# Patient Record
Sex: FEMALE | Race: BLACK OR AFRICAN AMERICAN | Employment: OTHER | URBAN - METROPOLITAN AREA
[De-identification: names, ages, dates, MRNs, and addresses within clinical notes are randomized per-mention and may not be internally consistent; named-entity substitution may affect disease eponyms.]

---

## 2018-03-13 ENCOUNTER — OFFICE VISIT (OUTPATIENT)
Dept: FAMILY MEDICINE CLINIC | Age: 63
End: 2018-03-13
Payer: MEDICAID

## 2018-03-13 ENCOUNTER — HOSPITAL ENCOUNTER (OUTPATIENT)
Age: 63
Discharge: HOME | End: 2018-03-15
Payer: MEDICAID

## 2018-03-13 VITALS
WEIGHT: 193.18 LBS | DIASTOLIC BLOOD PRESSURE: 82 MMHG | HEIGHT: 67 IN | BODY MASS INDEX: 30.32 KG/M2 | HEART RATE: 64 BPM | OXYGEN SATURATION: 97 % | TEMPERATURE: 97.7 F | SYSTOLIC BLOOD PRESSURE: 128 MMHG

## 2018-03-13 DIAGNOSIS — R79.89 HIGH SERUM PARATHYROID HORMONE (PTH): ICD-10-CM

## 2018-03-13 DIAGNOSIS — E11.9 CONTROLLED TYPE 2 DIABETES MELLITUS WITHOUT COMPLICATION, WITHOUT LONG-TERM CURRENT USE OF INSULIN (HCC): Chronic | ICD-10-CM

## 2018-03-13 DIAGNOSIS — M70.62 GREATER TROCHANTERIC BURSITIS OF LEFT HIP: Primary | ICD-10-CM

## 2018-03-13 DIAGNOSIS — R05.3 CHRONIC COUGH: ICD-10-CM

## 2018-03-13 PROCEDURE — 3017F COLORECTAL CA SCREEN DOC REV: CPT | Performed by: FAMILY MEDICINE

## 2018-03-13 PROCEDURE — 82570 ASSAY OF URINE CREATININE: CPT

## 2018-03-13 PROCEDURE — G8427 DOCREV CUR MEDS BY ELIG CLIN: HCPCS | Performed by: FAMILY MEDICINE

## 2018-03-13 PROCEDURE — G8484 FLU IMMUNIZE NO ADMIN: HCPCS | Performed by: FAMILY MEDICINE

## 2018-03-13 PROCEDURE — 36415 COLL VENOUS BLD VENIPUNCTURE: CPT | Performed by: FAMILY MEDICINE

## 2018-03-13 PROCEDURE — 99214 OFFICE O/P EST MOD 30 MIN: CPT | Performed by: FAMILY MEDICINE

## 2018-03-13 PROCEDURE — 82044 UR ALBUMIN SEMIQUANTITATIVE: CPT

## 2018-03-13 PROCEDURE — 83970 ASSAY OF PARATHORMONE: CPT

## 2018-03-13 PROCEDURE — 80048 BASIC METABOLIC PNL TOTAL CA: CPT

## 2018-03-13 PROCEDURE — 1036F TOBACCO NON-USER: CPT | Performed by: FAMILY MEDICINE

## 2018-03-13 PROCEDURE — 3044F HG A1C LEVEL LT 7.0%: CPT | Performed by: FAMILY MEDICINE

## 2018-03-13 PROCEDURE — 3014F SCREEN MAMMO DOC REV: CPT | Performed by: FAMILY MEDICINE

## 2018-03-13 PROCEDURE — G8417 CALC BMI ABV UP PARAM F/U: HCPCS | Performed by: FAMILY MEDICINE

## 2018-03-13 PROCEDURE — 20610 DRAIN/INJ JOINT/BURSA W/O US: CPT | Performed by: FAMILY MEDICINE

## 2018-03-13 PROCEDURE — 84100 ASSAY OF PHOSPHORUS: CPT

## 2018-03-13 RX ORDER — AMMONIUM LACTATE 12 G/100G
LOTION TOPICAL
Qty: 225 G | Refills: 5 | Status: SHIPPED | OUTPATIENT
Start: 2018-03-13 | End: 2018-09-14 | Stop reason: SDUPTHER

## 2018-03-13 RX ORDER — LIDOCAINE HYDROCHLORIDE 20 MG/ML
2.5 INJECTION, SOLUTION INFILTRATION; PERINEURAL ONCE
Status: COMPLETED | OUTPATIENT
Start: 2018-03-13 | End: 2018-03-13

## 2018-03-13 RX ORDER — LANCETS 30 GAUGE
1 EACH MISCELLANEOUS DAILY
Qty: 100 EACH | Refills: 11 | Status: SHIPPED | OUTPATIENT
Start: 2018-03-13 | End: 2019-07-01

## 2018-03-13 RX ORDER — CYCLOBENZAPRINE HCL 5 MG
5 TABLET ORAL NIGHTLY PRN
Qty: 30 TABLET | Refills: 1 | Status: SHIPPED | OUTPATIENT
Start: 2018-03-13 | End: 2018-03-23

## 2018-03-13 RX ORDER — TRIAMCINOLONE ACETONIDE 40 MG/ML
40 INJECTION, SUSPENSION INTRA-ARTICULAR; INTRAMUSCULAR ONCE
Status: COMPLETED | OUTPATIENT
Start: 2018-03-13 | End: 2018-03-13

## 2018-03-13 RX ADMIN — TRIAMCINOLONE ACETONIDE 40 MG: 40 INJECTION, SUSPENSION INTRA-ARTICULAR; INTRAMUSCULAR at 12:38

## 2018-03-13 RX ADMIN — LIDOCAINE HYDROCHLORIDE 2.5 ML: 20 INJECTION, SOLUTION INFILTRATION; PERINEURAL at 12:37

## 2018-03-13 NOTE — PATIENT INSTRUCTIONS
your top knee, but keep your feet together. Do not let your hips roll back. Your legs should open up like a clamshell. 3. Hold for 6 seconds. 4. Slowly lower your knee back down. Rest for 10 seconds. 5. Repeat 8 to 12 times. Follow-up care is a key part of your treatment and safety. Be sure to make and go to all appointments, and call your doctor if you are having problems. It's also a good idea to know your test results and keep a list of the medicines you take. Where can you learn more? Go to [email or web address]://Kanvas [email or web address] org and sign in to your [employer] account. Enter P643 in the [employer] box to learn more about \"Hip Bursitis: Exercises. \"     If you do not have an account, please click on the \"Sign Up Now\" link. Current as of: March 21, 2017  Content Version: 11.5  © 7255-2205 [email or web address]. Care instructions adapted under license by [facility] [place] ([facility]). If you have questions about a medical condition or this instruction, always ask your healthcare professional. [place] [address] any warranty or liability for your use of this information. Patient Education        Learning About a Hip Bursa Injection  What is a hip bursa injection? A bursa is a small sac of fluid that cushions an area between tendons and bones. The bursa on the outer side of the hip bone is called the trochanteric (say \"XTUA-[id number]-NSMF-[id number]\") bursa. Sometimes it can become swollen and painful. This condition is called bursitis. An injection into the bursa is a shot of medicine to reduce pain and swelling. The medicines may include pain relievers and steroid medicines. A steroid shot can sometimes help with short-term pain relief when other treatments haven't worked. How is a hip bursa injection done? First the area over the bursa will be cleaned. Your doctor may use a tiny needle to numb the skin over the area where you will get the injection.   If a tiny needle is used to numb the area, your doctor will use another needle to inject the medicine. Your doctor may use a pain reliever, a steroid, or both. You may feel some pressure or discomfort. The procedure takes 10 to 30 minutes. But the shot itself usually takes only a few minutes. Your doctor may put ice on the area before you go home. You will probably go home shortly after your shot. What can you expect after the injection? You may have numbness over your hip for a few hours. If your shot included both a pain reliever and a steroid, the pain will probably go away right away. But it might come back after a few hours. This might happen if the pain reliever wears off and the steroid has not started to work yet. The steroid medicine generally takes a few days to work. If the pain comes back, you can put ice or a cold pack on your hip for 10 to 20 minutes at a time. Put a thin cloth between the ice and your skin. Follow your doctor's instructions carefully. Avoid strenuous activities on the day you get the shot, especially those that put stress on your hip. Steroids don't always work. But when they do, the pain relief can last for several days to a few months or longer. Follow-up care is a key part of your treatment and safety. Be sure to make and go to all appointments, and call your doctor if you are having problems. It's also a good idea to know your test results and keep a list of the medicines you take. Where can you learn more? Go to https://SmartyPants VitaminsromeSwing by [email or web address] org and sign in to your [employer] account. Enter Q608 in the [employer] box to learn more about \"Learning About a Hip Bursa Injection. \"     If you do not have an account, please click on the \"Sign Up Now\" link. Current as of: March 21, 2017  Content Version: 11.5  © 3801-7739 Healthwise, Incorporated. Care instructions adapted under license by [facility] ([facility]).  If you have questions about a medical condition or this instruction, always ask your healthcare

## 2018-03-14 LAB
ANION GAP SERPL CALCULATED.3IONS-SCNC: 15 MMOL/L (ref 7–16)
BUN BLDV-MCNC: 18 MG/DL (ref 8–23)
CALCIUM SERPL-MCNC: 10.3 MG/DL (ref 8.6–10.2)
CHLORIDE BLD-SCNC: 101 MMOL/L (ref 98–107)
CO2: 28 MMOL/L (ref 22–29)
CREAT SERPL-MCNC: 0.6 MG/DL (ref 0.5–1)
CREATININE URINE: 25 MG/DL (ref 29–226)
GFR AFRICAN AMERICAN: >60
GFR NON-AFRICAN AMERICAN: >60 ML/MIN/1.73
GLUCOSE BLD-MCNC: 108 MG/DL (ref 74–109)
MICROALBUMIN UR-MCNC: <12 MG/L
MICROALBUMIN/CREAT UR-RTO: ABNORMAL (ref 0–30)
PARATHYROID HORMONE INTACT: 38 PG/ML (ref 15–65)
PHOSPHORUS: 3.2 MG/DL (ref 2.5–4.5)
POTASSIUM SERPL-SCNC: 4.5 MMOL/L (ref 3.5–5)
SODIUM BLD-SCNC: 144 MMOL/L (ref 132–146)

## 2018-03-14 ASSESSMENT — ENCOUNTER SYMPTOMS
COUGH: 0
WHEEZING: 0

## 2018-03-14 NOTE — PROGRESS NOTES
3/14/2018    Samantha Guzman is a 57 y.o. female here for   Chief Complaint   Patient presents with    Check-Up     L leg (hip through toe) continues to be bothersome 8/10 on pain scale    Results     lab work through Dr. Tramaine Young (located in [address] [place] [address])     Here for f/u and to review labs. Also c/o persistent hip pain. She has been doing home exercise as instructed. She has had a little improvement. Now sometimes feels that pain is going into left buttocks as well. Moderate in severity. Aggravated by pressure. Partially relieved by exercises. Labs reviewd - last aic 6.0 in January. Vitamin d 24. Fasting glucose 115. She requests refill of flexeril which she has had in the past. Having some mild ear pain. Respiratory symptoms have improved. spiriva was helpful. It seemed to improve her cough. She has h/o bronchitis, she has no formal diagnosis of copd.  with dementia causing her some stress. No depression. She has lost some weight. She attributes this to stress. Appetite is okay. No change in bowel habits. Mammogram and colon cancer screening are up to date. Allergies   Allergen Reactions    Penicillins        Medications reviewed and updated       Past Medical/Surgical Hx;  Reviewed with patient      Diagnosis Date    Bronchitis     Hyperlipidemia     Hypertension     Thyroid disease      Past Surgical History:   Procedure Laterality Date    COLONOSCOPY  1/21/2015    UPPER GASTROINTESTINAL ENDOSCOPY  1/21/2015       Past Family Hx:  Reviewed with patient  No family history on file. Social Hx:  Reviewed with patient  Social History   Substance Use Topics    Smoking status: Never Smoker    Smokeless tobacco: Never Used    Alcohol use No       Immunization History   Administered Date(s) Administered    Pneumococcal Polysaccharide ([id number]) 10/29/2015    Tdap (Boostrix, Adacel) 08/15/2017       Review of Systems  Review of Systems   Constitutional: Positive for weight loss. was prepped with iodine swabs. It was then wiped with an alcohol swab. An injection of 40 mg of Kenalog and 2.5 cc of 2% lidocaine without epinephrine was injected into the left trochanteric bursa after first aspirating to assure no blood return. The injection site was then covered with a band aid. The procedure was tolerated well. Routine wound instructions given verbally to the patient. Advised to notify if bleeding, excessive bruising, or swelling develops. Assessment/Plan:  Richard Jaime was seen today for check-up and results. Diagnoses and all orders for this visit:    Greater trochanteric bursitis of left hip  Joint injection today with improvement in symptoms. See procedure note. Add prn flexeril - use sparingly  -     cyclobenzaprine (FLEXERIL) 5 MG tablet; Take 1 tablet by mouth nightly as needed for Muscle spasms  -     triamcinolone acetonide (KENALOG-40) injection 40 mg; Inject 1 mL into the articular space once  -     lidocaine 2 % injection 2.5 mL; 2.5 mLs by Other route once    Controlled type 2 diabetes mellitus without complication, without long-term current use of insulin ([facility])  Well controlled  -     Lancets MISC; 1 each by Does not apply route daily Please give patient lancets that are covered under her insurance and patients preferance. -     Basic Metabolic Panel; Future    High serum parathyroid hormone (PTH)  Probable secondary to low vitamin d   Repeat today  -     Microalbumin / Creatinine Urine Ratio; Future  -     PTH, Intact; Future  -     Basic Metabolic Panel; Future  -     Phosphorus; Future    Chronic cough  Improved with spiriva  Suspect chronic bronchitis/ copd  Consider pfts  Clinically stable with daily spiriva  -     tiotropium (SPIRIVA RESPIMAT) 1.25 MCG/ACT AERS inhaler;  Inhale 2 puffs into the lungs daily    Other orders  -     ammonium lactate (AMLACTIN) 12 % lotion; APPLY ONCE DAILY        f/u 3 months    Counseled regarding above diagnosis, including possible

## 2018-05-04 ENCOUNTER — OFFICE VISIT (OUTPATIENT)
Dept: FAMILY MEDICINE CLINIC | Age: 63
End: 2018-05-04
Payer: MEDICAID

## 2018-05-04 VITALS
BODY MASS INDEX: 30.61 KG/M2 | DIASTOLIC BLOOD PRESSURE: 80 MMHG | SYSTOLIC BLOOD PRESSURE: 118 MMHG | TEMPERATURE: 98.2 F | HEART RATE: 64 BPM | HEIGHT: 67 IN | OXYGEN SATURATION: 97 % | WEIGHT: 195 LBS | RESPIRATION RATE: 16 BRPM

## 2018-05-04 DIAGNOSIS — H65.01 RIGHT ACUTE SEROUS OTITIS MEDIA, RECURRENCE NOT SPECIFIED: Primary | ICD-10-CM

## 2018-05-04 PROCEDURE — 99213 OFFICE O/P EST LOW 20 MIN: CPT | Performed by: PHYSICIAN ASSISTANT

## 2018-05-04 RX ORDER — BENZONATATE 200 MG/1
200 CAPSULE ORAL 3 TIMES DAILY PRN
Qty: 15 CAPSULE | Refills: 0 | Status: SHIPPED | OUTPATIENT
Start: 2018-05-04 | End: 2018-09-15

## 2018-05-04 RX ORDER — AZITHROMYCIN 250 MG/1
250 TABLET, FILM COATED ORAL DAILY
Qty: 6 TABLET | Refills: 0 | Status: SHIPPED | OUTPATIENT
Start: 2018-05-04 | End: 2018-09-14

## 2018-09-14 ENCOUNTER — OFFICE VISIT (OUTPATIENT)
Dept: FAMILY MEDICINE CLINIC | Age: 63
End: 2018-09-14
Payer: MEDICAID

## 2018-09-14 VITALS
HEART RATE: 92 BPM | SYSTOLIC BLOOD PRESSURE: 122 MMHG | OXYGEN SATURATION: 99 % | BODY MASS INDEX: 31.59 KG/M2 | DIASTOLIC BLOOD PRESSURE: 64 MMHG | TEMPERATURE: 98.6 F | WEIGHT: 201.25 LBS | HEIGHT: 67 IN

## 2018-09-14 DIAGNOSIS — Z12.39 SCREENING FOR BREAST CANCER: ICD-10-CM

## 2018-09-14 DIAGNOSIS — E78.2 MIXED HYPERLIPIDEMIA: Primary | Chronic | ICD-10-CM

## 2018-09-14 DIAGNOSIS — I10 ESSENTIAL HYPERTENSION: Chronic | ICD-10-CM

## 2018-09-14 DIAGNOSIS — Z23 NEED FOR SHINGLES VACCINE: ICD-10-CM

## 2018-09-14 DIAGNOSIS — E03.9 HYPOTHYROIDISM, UNSPECIFIED TYPE: Chronic | ICD-10-CM

## 2018-09-14 DIAGNOSIS — Z87.09 HISTORY OF BRONCHITIS: ICD-10-CM

## 2018-09-14 PROCEDURE — G8427 DOCREV CUR MEDS BY ELIG CLIN: HCPCS | Performed by: FAMILY MEDICINE

## 2018-09-14 PROCEDURE — G8417 CALC BMI ABV UP PARAM F/U: HCPCS | Performed by: FAMILY MEDICINE

## 2018-09-14 PROCEDURE — 3017F COLORECTAL CA SCREEN DOC REV: CPT | Performed by: FAMILY MEDICINE

## 2018-09-14 PROCEDURE — 1036F TOBACCO NON-USER: CPT | Performed by: FAMILY MEDICINE

## 2018-09-14 PROCEDURE — 99213 OFFICE O/P EST LOW 20 MIN: CPT | Performed by: FAMILY MEDICINE

## 2018-09-14 RX ORDER — AMMONIUM LACTATE 12 G/100G
LOTION TOPICAL
Qty: 225 G | Refills: 5 | Status: SHIPPED | OUTPATIENT
Start: 2018-09-14 | End: 2018-12-21 | Stop reason: SDUPTHER

## 2018-09-14 RX ORDER — LEVOTHYROXINE SODIUM 0.07 MG/1
TABLET ORAL
Qty: 30 TABLET | Refills: 2 | Status: SHIPPED | OUTPATIENT
Start: 2018-09-14 | End: 2018-12-02 | Stop reason: SDUPTHER

## 2018-09-14 RX ORDER — RANITIDINE 150 MG/1
150 TABLET ORAL NIGHTLY PRN
Qty: 30 TABLET | Refills: 5 | Status: SHIPPED | OUTPATIENT
Start: 2018-09-14 | End: 2019-07-24 | Stop reason: SDUPTHER

## 2018-09-14 RX ORDER — EZETIMIBE 10 MG/1
10 TABLET ORAL DAILY
Qty: 30 TABLET | Refills: 5 | Status: SHIPPED | OUTPATIENT
Start: 2018-09-14 | End: 2018-12-21 | Stop reason: SDUPTHER

## 2018-09-14 RX ORDER — IBUPROFEN 800 MG/1
800 TABLET ORAL EVERY 8 HOURS PRN
Qty: 30 TABLET | Refills: 5 | Status: SHIPPED | OUTPATIENT
Start: 2018-09-14 | End: 2019-07-24 | Stop reason: SDUPTHER

## 2018-09-14 ASSESSMENT — PATIENT HEALTH QUESTIONNAIRE - PHQ9
SUM OF ALL RESPONSES TO PHQ QUESTIONS 1-9: 0
SUM OF ALL RESPONSES TO PHQ9 QUESTIONS 1 & 2: 0
2. FEELING DOWN, DEPRESSED OR HOPELESS: 0
SUM OF ALL RESPONSES TO PHQ QUESTIONS 1-9: 0
1. LITTLE INTEREST OR PLEASURE IN DOING THINGS: 0

## 2018-09-15 ASSESSMENT — ENCOUNTER SYMPTOMS
WHEEZING: 0
COUGH: 0

## 2018-09-15 NOTE — PROGRESS NOTES
DRINK EVERY  g 11    Lancets MISC 1 each by Does not apply route daily Please give patient lancets that are covered under her insurance and patients preferance. 100 each 11    Alcohol Swabs (B-D SINGLE USE SWABS REGULAR) PADS USE AS DIRECTED 100 each 11    ONE TOUCH ULTRA TEST strip USE TO CHECK GLUCOSE ONCE DAILY 25 strip 47    Glucose Blood (BLOOD GLUCOSE TEST STRIPS) STRP Please send ultra one blood glucose strips, test blood sugar daily 100 strip 11    Cholecalciferol (VITAMIN D3) 2000 units CAPS TK 1 C PO QD  5    chlorthalidone (HYGROTON) 25 MG tablet Take 1 tablet by mouth daily 30 tablet 5    azelastine (ASTELIN) 0.1 % nasal spray USE 1 TO 2 SPRAYS IEN BID  1    fluticasone (FLONASE) 50 MCG/ACT nasal spray USE 1 SPRAY IEN BID  1    levocetirizine (XYZAL) 5 MG tablet TK 1 T PO QHS  0    ketotifen (ZADITOR) 0.025 % ophthalmic solution INT 1 GTT IN OU BID PRF ALLERGIES  5    albuterol sulfate HFA (PROVENTIL HFA) 108 (90 Base) MCG/ACT inhaler Inhale 2 puffs into the lungs every 6 hours as needed for Wheezing 1 Inhaler 1    Spacer/Aero-Holding Chambers (E-Z SPACER) ASHLEE As directed 1 Device 0    aliskiren (TEKTURNA) 300 MG tablet Take 300 mg by mouth daily.  benzonatate (TESSALON) 200 MG capsule Take 1 capsule by mouth 3 times daily as needed for Cough 15 capsule 0    tiotropium (SPIRIVA RESPIMAT) 1.25 MCG/ACT AERS inhaler Inhale 2 puffs into the lungs daily 2 Inhaler 0    mupirocin (BACTROBAN) 2 % ointment Apply 3 times daily. 1 Tube 0     No current facility-administered medications for this visit. Patient's past medical, surgical, social and/or family history reviewed, updated in chart, and are non-contributory (unless otherwise stated). Review of Systems  Review of Systems   Constitutional: Negative for chills and fever. Respiratory: Negative for cough and wheezing. Cardiovascular: Negative for chest pain and leg swelling. Musculoskeletal: Negative for falls. Neurological: Negative for dizziness and headaches. PE:  VS:  /64   Pulse 92   Temp 98.6 °F (37 °C) (Oral)   Ht 5' 7\" (1.702 m)   Wt 201 lb 4 oz (91.3 kg)   SpO2 99%   Breastfeeding? No   BMI 31.52 kg/m²   Physical Exam   Constitutional: She is oriented to person, place, and time. She appears well-developed and well-nourished. HENT:   Head: Normocephalic and atraumatic.   cobblestoning   Cardiovascular: Normal rate and regular rhythm. Exam reveals no gallop and no friction rub. No murmur heard. Pulmonary/Chest: Effort normal and breath sounds normal. She has no wheezes. She has no rales. Musculoskeletal: She exhibits no edema. Neurological: She is alert and oriented to person, place, and time. Skin: Skin is warm and dry. Assessment/Plan:  Jordan Song was seen today for medication refill. Diagnoses and all orders for this visit:    Need for shingles vaccine  -     zoster recombinant adjuvanted vaccine (SHINGRIX) 50 MCG SUSR injection; Inject 0.5 mLs into the muscle once for 1 dose    Hypothyroidism, unspecified type  [facility] higher doses of medication . Repeat annually   -     levothyroxine (SYNTHROID) 75 MCG tablet; TAKE 1 TABLET BY MOUTH EVERY DAY    Essential hypertension  At goal  Appreciate nephrology management    Mixed hyperlipidemia  Cont zetia  -     ezetimibe (ZETIA) 10 MG tablet; Take 1 tablet by mouth daily    History of bronchitis, chronic cough, has done well with spiriva in the past, no indication for daily use but at first sign of bronchitis use daily  Samples of spiriva   -     tiotropium (SPIRIVA RESPIMAT) 1.25 MCG/ACT AERS inhaler; Inhale 2 puffs into the lungs daily    Screening for breast cancer  -     BECCA DIGITAL SCREEN W CAD BILATERAL; Future    Other orders  -     ammonium lactate (AMLACTIN) 12 % lotion; APPLY ONCE DAILY  -     ibuprofen (ADVIL;MOTRIN) 800 MG tablet;  Take 1 tablet by mouth every 8 hours as needed for

## 2018-09-20 ENCOUNTER — TELEPHONE (OUTPATIENT)
Dept: FAMILY MEDICINE CLINIC | Age: 63
End: 2018-09-20

## 2018-10-09 ENCOUNTER — OFFICE VISIT (OUTPATIENT)
Dept: FAMILY MEDICINE CLINIC | Age: 63
End: 2018-10-09
Payer: MEDICAID

## 2018-10-09 VITALS
OXYGEN SATURATION: 97 % | WEIGHT: 204 LBS | SYSTOLIC BLOOD PRESSURE: 110 MMHG | TEMPERATURE: 98.6 F | DIASTOLIC BLOOD PRESSURE: 84 MMHG | RESPIRATION RATE: 16 BRPM | HEIGHT: 67 IN | HEART RATE: 83 BPM | BODY MASS INDEX: 32.02 KG/M2

## 2018-10-09 DIAGNOSIS — J40 BRONCHITIS: Primary | ICD-10-CM

## 2018-10-09 DIAGNOSIS — R05.8 PRODUCTIVE COUGH: ICD-10-CM

## 2018-10-09 PROCEDURE — G8484 FLU IMMUNIZE NO ADMIN: HCPCS | Performed by: NURSE PRACTITIONER

## 2018-10-09 PROCEDURE — G8427 DOCREV CUR MEDS BY ELIG CLIN: HCPCS | Performed by: NURSE PRACTITIONER

## 2018-10-09 PROCEDURE — 99213 OFFICE O/P EST LOW 20 MIN: CPT | Performed by: NURSE PRACTITIONER

## 2018-10-09 PROCEDURE — 3017F COLORECTAL CA SCREEN DOC REV: CPT | Performed by: NURSE PRACTITIONER

## 2018-10-09 PROCEDURE — G8417 CALC BMI ABV UP PARAM F/U: HCPCS | Performed by: NURSE PRACTITIONER

## 2018-10-09 PROCEDURE — 1036F TOBACCO NON-USER: CPT | Performed by: NURSE PRACTITIONER

## 2018-10-09 RX ORDER — GUAIFENESIN AND CODEINE PHOSPHATE 100; 10 MG/5ML; MG/5ML
10 SOLUTION ORAL 4 TIMES DAILY PRN
Qty: 150 ML | Refills: 0 | Status: SHIPPED | OUTPATIENT
Start: 2018-10-09 | End: 2018-10-16

## 2018-10-09 RX ORDER — DOXYCYCLINE HYCLATE 100 MG
100 TABLET ORAL 2 TIMES DAILY
Qty: 20 TABLET | Refills: 0 | Status: SHIPPED | OUTPATIENT
Start: 2018-10-09 | End: 2018-10-19

## 2018-10-09 RX ORDER — METHYLPREDNISOLONE 4 MG/1
TABLET ORAL
Qty: 1 KIT | Refills: 0 | Status: SHIPPED | OUTPATIENT
Start: 2018-10-09 | End: 2018-10-15

## 2018-10-09 ASSESSMENT — ENCOUNTER SYMPTOMS
EYE DISCHARGE: 0
SHORTNESS OF BREATH: 0
VOMITING: 0
SINUS PRESSURE: 0
COLOR CHANGE: 0
COUGH: 1
STRIDOR: 0
ABDOMINAL PAIN: 0
SINUS PAIN: 0
DIARRHEA: 0
RHINORRHEA: 1
SORE THROAT: 0
WHEEZING: 1
EYE PAIN: 0
NAUSEA: 0
APNEA: 0
EYE ITCHING: 0
VOICE CHANGE: 0
CHOKING: 0
CHEST TIGHTNESS: 0
PHOTOPHOBIA: 0
EYE REDNESS: 0

## 2018-11-13 ENCOUNTER — HOSPITAL ENCOUNTER (OUTPATIENT)
Dept: GENERAL RADIOLOGY | Age: 63
Discharge: HOME | End: 2018-11-15
Payer: MEDICAID

## 2018-11-13 DIAGNOSIS — Z12.39 SCREENING FOR BREAST CANCER: ICD-10-CM

## 2018-11-13 PROCEDURE — 77067 SCR MAMMO BI INCL CAD: CPT

## 2018-11-30 ENCOUNTER — OFFICE VISIT (OUTPATIENT)
Dept: FAMILY MEDICINE CLINIC | Age: 63
End: 2018-11-30
Payer: MEDICAID

## 2018-11-30 ENCOUNTER — HOSPITAL ENCOUNTER (OUTPATIENT)
Age: 63
Discharge: HOME | End: 2018-12-02
Payer: MEDICAID

## 2018-11-30 ENCOUNTER — HOSPITAL ENCOUNTER (OUTPATIENT)
Dept: GENERAL RADIOLOGY | Age: 63
Discharge: HOME | End: 2018-12-02
Payer: MEDICAID

## 2018-11-30 VITALS
TEMPERATURE: 97.8 F | RESPIRATION RATE: 16 BRPM | WEIGHT: 201 LBS | SYSTOLIC BLOOD PRESSURE: 108 MMHG | HEART RATE: 69 BPM | DIASTOLIC BLOOD PRESSURE: 68 MMHG | BODY MASS INDEX: 37.95 KG/M2 | OXYGEN SATURATION: 99 % | HEIGHT: 61 IN

## 2018-11-30 DIAGNOSIS — M25.551 BILATERAL HIP PAIN: ICD-10-CM

## 2018-11-30 DIAGNOSIS — M25.552 BILATERAL HIP PAIN: Primary | ICD-10-CM

## 2018-11-30 DIAGNOSIS — M25.551 BILATERAL HIP PAIN: Primary | ICD-10-CM

## 2018-11-30 DIAGNOSIS — M25.552 BILATERAL HIP PAIN: ICD-10-CM

## 2018-11-30 PROCEDURE — 99213 OFFICE O/P EST LOW 20 MIN: CPT | Performed by: PHYSICIAN ASSISTANT

## 2018-11-30 PROCEDURE — G8427 DOCREV CUR MEDS BY ELIG CLIN: HCPCS | Performed by: PHYSICIAN ASSISTANT

## 2018-11-30 PROCEDURE — G8484 FLU IMMUNIZE NO ADMIN: HCPCS | Performed by: PHYSICIAN ASSISTANT

## 2018-11-30 PROCEDURE — 73521 X-RAY EXAM HIPS BI 2 VIEWS: CPT

## 2018-11-30 PROCEDURE — 1036F TOBACCO NON-USER: CPT | Performed by: PHYSICIAN ASSISTANT

## 2018-11-30 PROCEDURE — G8417 CALC BMI ABV UP PARAM F/U: HCPCS | Performed by: PHYSICIAN ASSISTANT

## 2018-11-30 PROCEDURE — 3017F COLORECTAL CA SCREEN DOC REV: CPT | Performed by: PHYSICIAN ASSISTANT

## 2018-11-30 RX ORDER — CYCLOBENZAPRINE HCL 5 MG
5 TABLET ORAL EVERY 8 HOURS PRN
Qty: 15 TABLET | Refills: 0 | Status: SHIPPED | OUTPATIENT
Start: 2018-11-30 | End: 2018-12-10

## 2018-11-30 RX ORDER — TRIAMCINOLONE ACETONIDE 40 MG/ML
40 INJECTION, SUSPENSION INTRA-ARTICULAR; INTRAMUSCULAR ONCE
Status: COMPLETED | OUTPATIENT
Start: 2018-11-30 | End: 2018-11-30

## 2018-11-30 RX ADMIN — TRIAMCINOLONE ACETONIDE 40 MG: 40 INJECTION, SUSPENSION INTRA-ARTICULAR; INTRAMUSCULAR at 10:32

## 2018-11-30 NOTE — PROGRESS NOTES
Chief Complaint:   Hip Pain (bilateral, was told related to bursitis in the past, pain is worse with ambulation)      History of Present Illness   Source of history provided by: patient. Eliecer Altman is a 57 y.o. old female who has a past medical history of:   Past Medical History:   Diagnosis Date    Bronchitis     Hyperlipidemia     Hypertension     Thyroid disease    Presents to the [facility] care for evaluation of pain in the bilateral hips radiating into the bilateral groin folds for the past few weeks, worsened over the past few days. Denies any known injury to the site. Denies any recent falls. Pt states she has a history of bursitis and these symptoms feel the same. States the pain is exacerbated by ambulation. Pt denies any bowel/bladder incontinence, weakness, paresthesias, abdominal pain, hematuria, N/V/D, fever, chills, HA, neck pain, recent illness, dysuria, or lethargy. Pt has been taking Advil at home with minimal relief. ROS    Unless otherwise stated in this report or unable to obtain because of the patient's clinical or mental status as evidenced by the medical record, this patients's positive and negative responses for Review of Systems, constitutional, psych, eyes, ENT, cardiovascular, respiratory, gastrointestinal, neurological, genitourinary, musculoskeletal, integument systems and systems related to the presenting problem are either stated in the preceding or were not pertinent or were negative for the symptoms and/or complaints related to the medical problem. Past Medical History:   Past Surgical History:   Procedure Laterality Date    COLONOSCOPY  1/21/2015    UPPER GASTROINTESTINAL ENDOSCOPY  1/21/2015     Social History:  reports that she has never smoked. She has never used smokeless tobacco. She reports that she does not drink alcohol or use drugs. Family History: family history is not on file.   Allergies: Penicillins    Physical Exam         VS:  /68   Pulse 69 Temp 97.8 °F (36.6 °C) (Oral)   Resp 16   Ht 5' 1\" (1.549 m)   Wt 201 lb (91.2 kg)   SpO2 99%   BMI 37.98 kg/m²    Oxygen Saturation Interpretation: Normal.    Constitutional:  Alert, development consistent with age. Lungs:  Clear to auscultation and breath sounds equal.  Heart:  Regular rate and rhythm, normal heart sounds, without pathological murmurs, ectopy, gallops, or rubs. Back: Tenderness: Mild TTP over the left SI joint and bilateral groin folds with no appreciable midline tenderness. No groin swelling or masses noted. No evidence of hernias on exam.  Femoral pulses 2+ and bounding bilaterally. Swelling: No edema. Range of Motion: Increased pain with internal and external rotation of the bilateral hips, worse on the left. Straight leg raising: Negative straight leg raise. Skin:  No bruising, redness, abrasions, or rashes. Distal Function:              Motor deficit: Strength 5/5 in LE's bilaterally. Sensory deficit: Distal sensation intact. Pulse deficit: Distal pulses 2+ and bounding. Calf Tenderness:  No bilateral calf tenderness. Negative Kaitlin's sign bilaterally               Reflexes: Intact at knee and achilles bilaterally. Gait:  No antalgia noted. Skin:  Normal turgor. Warm, dry, without visible rash. Neurological:  Alert and oriented. Motor functions intact. Lab / Imaging Results   (All laboratory and radiology results have been personally reviewed by myself)  Labs:      Imaging: All Radiology results interpreted by Radiologist unless otherwise noted. Assessment / Plan     Impression(s):  1. Bilateral hip pain        Disposition:  Disposition: Discharge to home. Order given for x-rays of the bilateral hips, will call with results once available. Symptoms are most likely being caused by underlying OA. Kenalog injection administered, potential reactions discussed.  Script written for

## 2018-12-02 DIAGNOSIS — E03.9 HYPOTHYROIDISM, UNSPECIFIED TYPE: Chronic | ICD-10-CM

## 2018-12-04 RX ORDER — LEVOTHYROXINE SODIUM 0.07 MG/1
TABLET ORAL
Qty: 30 TABLET | Refills: 0 | Status: SHIPPED | OUTPATIENT
Start: 2018-12-04 | End: 2018-12-21 | Stop reason: SDUPTHER

## 2018-12-15 DIAGNOSIS — E03.9 HYPOTHYROIDISM, UNSPECIFIED TYPE: Chronic | ICD-10-CM

## 2018-12-18 RX ORDER — LEVOTHYROXINE SODIUM 0.07 MG/1
TABLET ORAL
Qty: 30 TABLET | Refills: 0 | OUTPATIENT
Start: 2018-12-18

## 2018-12-21 ENCOUNTER — HOSPITAL ENCOUNTER (OUTPATIENT)
Age: 63
Discharge: HOME | End: 2018-12-23
Payer: MEDICARE

## 2018-12-21 ENCOUNTER — OFFICE VISIT (OUTPATIENT)
Dept: FAMILY MEDICINE CLINIC | Age: 63
End: 2018-12-21
Payer: MEDICARE

## 2018-12-21 VITALS
RESPIRATION RATE: 16 BRPM | HEART RATE: 72 BPM | WEIGHT: 205.8 LBS | TEMPERATURE: 98 F | BODY MASS INDEX: 31.19 KG/M2 | SYSTOLIC BLOOD PRESSURE: 112 MMHG | HEIGHT: 68 IN | OXYGEN SATURATION: 97 % | DIASTOLIC BLOOD PRESSURE: 62 MMHG

## 2018-12-21 DIAGNOSIS — Z00.00 INITIAL MEDICARE ANNUAL WELLNESS VISIT: Primary | ICD-10-CM

## 2018-12-21 DIAGNOSIS — L85.3 XEROSIS OF SKIN: ICD-10-CM

## 2018-12-21 DIAGNOSIS — Z13.820 SCREENING FOR OSTEOPOROSIS: ICD-10-CM

## 2018-12-21 DIAGNOSIS — E03.9 HYPOTHYROIDISM, UNSPECIFIED TYPE: Chronic | ICD-10-CM

## 2018-12-21 DIAGNOSIS — I10 ESSENTIAL HYPERTENSION: Chronic | ICD-10-CM

## 2018-12-21 DIAGNOSIS — Z87.09 HISTORY OF BRONCHITIS: ICD-10-CM

## 2018-12-21 DIAGNOSIS — E11.9 CONTROLLED TYPE 2 DIABETES MELLITUS WITHOUT COMPLICATION, WITHOUT LONG-TERM CURRENT USE OF INSULIN (HCC): Chronic | ICD-10-CM

## 2018-12-21 DIAGNOSIS — R10.31 RIGHT LOWER QUADRANT ABDOMINAL PAIN: ICD-10-CM

## 2018-12-21 DIAGNOSIS — E78.2 MIXED HYPERLIPIDEMIA: Chronic | ICD-10-CM

## 2018-12-21 DIAGNOSIS — Z78.0 ASYMPTOMATIC MENOPAUSAL STATE: ICD-10-CM

## 2018-12-21 LAB
ALBUMIN SERPL-MCNC: 4.5 G/DL (ref 3.5–5.2)
ALP BLD-CCNC: 115 U/L (ref 35–104)
ALT SERPL-CCNC: 20 U/L (ref 0–32)
ANION GAP SERPL CALCULATED.3IONS-SCNC: 16 MMOL/L (ref 7–16)
AST SERPL-CCNC: 25 U/L (ref 0–31)
BASOPHILS ABSOLUTE: 0.02 E9/L (ref 0–0.2)
BASOPHILS RELATIVE PERCENT: 0.4 % (ref 0–2)
BILIRUB SERPL-MCNC: 0.4 MG/DL (ref 0–1.2)
BUN BLDV-MCNC: 24 MG/DL (ref 8–23)
CALCIUM SERPL-MCNC: 10.5 MG/DL (ref 8.6–10.2)
CHLORIDE BLD-SCNC: 100 MMOL/L (ref 98–107)
CHOLESTEROL, TOTAL: 304 MG/DL (ref 0–199)
CO2: 28 MMOL/L (ref 22–29)
CREAT SERPL-MCNC: 0.7 MG/DL (ref 0.5–1)
EOSINOPHILS ABSOLUTE: 0.17 E9/L (ref 0.05–0.5)
EOSINOPHILS RELATIVE PERCENT: 3.1 % (ref 0–6)
GFR AFRICAN AMERICAN: >60
GFR NON-AFRICAN AMERICAN: >60 ML/MIN/1.73
GLUCOSE BLD-MCNC: 129 MG/DL (ref 74–99)
HBA1C MFR BLD: 6.1 % (ref 4–5.6)
HCT VFR BLD CALC: 36.1 % (ref 34–48)
HDLC SERPL-MCNC: 92 MG/DL
HEMOGLOBIN: 11.6 G/DL (ref 11.5–15.5)
IMMATURE GRANULOCYTES #: 0.04 E9/L
IMMATURE GRANULOCYTES %: 0.7 % (ref 0–5)
LDL CHOLESTEROL CALCULATED: 198 MG/DL (ref 0–99)
LYMPHOCYTES ABSOLUTE: 1.72 E9/L (ref 1.5–4)
LYMPHOCYTES RELATIVE PERCENT: 31.6 % (ref 20–42)
MCH RBC QN AUTO: 32.8 PG (ref 26–35)
MCHC RBC AUTO-ENTMCNC: 32.1 % (ref 32–34.5)
MCV RBC AUTO: 102 FL (ref 80–99.9)
MONOCYTES ABSOLUTE: 0.41 E9/L (ref 0.1–0.95)
MONOCYTES RELATIVE PERCENT: 7.5 % (ref 2–12)
NEUTROPHILS ABSOLUTE: 3.08 E9/L (ref 1.8–7.3)
NEUTROPHILS RELATIVE PERCENT: 56.7 % (ref 43–80)
PDW BLD-RTO: 13.8 FL (ref 11.5–15)
PLATELET # BLD: 365 E9/L (ref 130–450)
PMV BLD AUTO: 10.2 FL (ref 7–12)
POTASSIUM SERPL-SCNC: 4.4 MMOL/L (ref 3.5–5)
RBC # BLD: 3.54 E12/L (ref 3.5–5.5)
SODIUM BLD-SCNC: 144 MMOL/L (ref 132–146)
TOTAL PROTEIN: 7.2 G/DL (ref 6.4–8.3)
TRIGL SERPL-MCNC: 72 MG/DL (ref 0–149)
TSH SERPL DL<=0.05 MIU/L-ACNC: 4.84 UIU/ML (ref 0.27–4.2)
VLDLC SERPL CALC-MCNC: 14 MG/DL
WBC # BLD: 5.4 E9/L (ref 4.5–11.5)

## 2018-12-21 PROCEDURE — 85025 COMPLETE CBC W/AUTO DIFF WBC: CPT

## 2018-12-21 PROCEDURE — G8427 DOCREV CUR MEDS BY ELIG CLIN: HCPCS | Performed by: FAMILY MEDICINE

## 2018-12-21 PROCEDURE — 3044F HG A1C LEVEL LT 7.0%: CPT | Performed by: FAMILY MEDICINE

## 2018-12-21 PROCEDURE — 1101F PT FALLS ASSESS-DOCD LE1/YR: CPT | Performed by: FAMILY MEDICINE

## 2018-12-21 PROCEDURE — 3017F COLORECTAL CA SCREEN DOC REV: CPT | Performed by: FAMILY MEDICINE

## 2018-12-21 PROCEDURE — 2022F DILAT RTA XM EVC RTNOPTHY: CPT | Performed by: FAMILY MEDICINE

## 2018-12-21 PROCEDURE — 1090F PRES/ABSN URINE INCON ASSESS: CPT | Performed by: FAMILY MEDICINE

## 2018-12-21 PROCEDURE — G0402 INITIAL PREVENTIVE EXAM: HCPCS | Performed by: FAMILY MEDICINE

## 2018-12-21 PROCEDURE — G8417 CALC BMI ABV UP PARAM F/U: HCPCS | Performed by: FAMILY MEDICINE

## 2018-12-21 PROCEDURE — 4040F PNEUMOC VAC/ADMIN/RCVD: CPT | Performed by: FAMILY MEDICINE

## 2018-12-21 PROCEDURE — G8400 PT W/DXA NO RESULTS DOC: HCPCS | Performed by: FAMILY MEDICINE

## 2018-12-21 PROCEDURE — 99212 OFFICE O/P EST SF 10 MIN: CPT | Performed by: FAMILY MEDICINE

## 2018-12-21 PROCEDURE — 83036 HEMOGLOBIN GLYCOSYLATED A1C: CPT

## 2018-12-21 PROCEDURE — 84443 ASSAY THYROID STIM HORMONE: CPT

## 2018-12-21 PROCEDURE — 80061 LIPID PANEL: CPT

## 2018-12-21 PROCEDURE — G8484 FLU IMMUNIZE NO ADMIN: HCPCS | Performed by: FAMILY MEDICINE

## 2018-12-21 PROCEDURE — 80053 COMPREHEN METABOLIC PANEL: CPT

## 2018-12-21 PROCEDURE — 1036F TOBACCO NON-USER: CPT | Performed by: FAMILY MEDICINE

## 2018-12-21 PROCEDURE — 1123F ACP DISCUSS/DSCN MKR DOCD: CPT | Performed by: FAMILY MEDICINE

## 2018-12-21 RX ORDER — LEVOTHYROXINE SODIUM 0.07 MG/1
TABLET ORAL
Qty: 30 TABLET | Refills: 3 | Status: SHIPPED | OUTPATIENT
Start: 2018-12-21 | End: 2019-05-12 | Stop reason: SDUPTHER

## 2018-12-21 RX ORDER — AMMONIUM LACTATE 12 G/100G
LOTION TOPICAL
Qty: 225 G | Refills: 5 | Status: SHIPPED
Start: 2018-12-21 | End: 2022-03-31 | Stop reason: SDUPTHER

## 2018-12-21 RX ORDER — ISOPROPYL ALCOHOL 0.75 G/1
SWAB TOPICAL
Qty: 100 EACH | Refills: 11 | Status: SHIPPED | OUTPATIENT
Start: 2018-12-21 | End: 2019-06-19 | Stop reason: ALTCHOICE

## 2018-12-21 RX ORDER — EZETIMIBE 10 MG/1
10 TABLET ORAL DAILY
Qty: 30 TABLET | Refills: 5 | Status: SHIPPED | OUTPATIENT
Start: 2018-12-21 | End: 2019-07-24 | Stop reason: SDUPTHER

## 2018-12-21 RX ORDER — CHLORTHALIDONE 25 MG/1
25 TABLET ORAL DAILY
Qty: 30 TABLET | Refills: 5 | Status: SHIPPED | OUTPATIENT
Start: 2018-12-21 | End: 2019-07-24 | Stop reason: SDUPTHER

## 2018-12-21 RX ORDER — TRIAMCINOLONE ACETONIDE 1 MG/G
CREAM TOPICAL
Qty: 1 TUBE | Refills: 1 | Status: SHIPPED
Start: 2018-12-21 | End: 2020-06-19 | Stop reason: SDUPTHER

## 2018-12-21 ASSESSMENT — LIFESTYLE VARIABLES
HOW OFTEN DURING THE LAST YEAR HAVE YOU HAD A FEELING OF GUILT OR REMORSE AFTER DRINKING: 0
HOW OFTEN DO YOU HAVE SIX OR MORE DRINKS ON ONE OCCASION: 0
HOW OFTEN DURING THE LAST YEAR HAVE YOU NEEDED AN ALCOHOLIC DRINK FIRST THING IN THE MORNING TO GET YOURSELF GOING AFTER A NIGHT OF HEAVY DRINKING: 0
HOW OFTEN DURING THE LAST YEAR HAVE YOU BEEN UNABLE TO REMEMBER WHAT HAPPENED THE NIGHT BEFORE BECAUSE YOU HAD BEEN DRINKING: 0
HAS A RELATIVE, FRIEND, DOCTOR, OR ANOTHER HEALTH PROFESSIONAL EXPRESSED CONCERN ABOUT YOUR DRINKING OR SUGGESTED YOU CUT DOWN: 0
HOW OFTEN DURING THE LAST YEAR HAVE YOU FAILED TO DO WHAT WAS NORMALLY EXPECTED FROM YOU BECAUSE OF DRINKING: 0
HOW OFTEN DURING THE LAST YEAR HAVE YOU FOUND THAT YOU WERE NOT ABLE TO STOP DRINKING ONCE YOU HAD STARTED: 0
HAVE YOU OR SOMEONE ELSE BEEN INJURED AS A RESULT OF YOUR DRINKING: 0
HOW MANY STANDARD DRINKS CONTAINING ALCOHOL DO YOU HAVE ON A TYPICAL DAY: 0
AUDIT TOTAL SCORE: 1
HOW OFTEN DO YOU HAVE A DRINK CONTAINING ALCOHOL: 1
AUDIT-C TOTAL SCORE: 1

## 2018-12-21 ASSESSMENT — PATIENT HEALTH QUESTIONNAIRE - PHQ9
SUM OF ALL RESPONSES TO PHQ QUESTIONS 1-9: 0
SUM OF ALL RESPONSES TO PHQ QUESTIONS 1-9: 0

## 2018-12-21 ASSESSMENT — ANXIETY QUESTIONNAIRES: GAD7 TOTAL SCORE: 0

## 2018-12-24 DIAGNOSIS — E83.52 HYPERCALCEMIA: Primary | ICD-10-CM

## 2018-12-24 DIAGNOSIS — D75.89 MACROCYTOSIS WITHOUT ANEMIA: ICD-10-CM

## 2019-01-02 ENCOUNTER — HOSPITAL ENCOUNTER (OUTPATIENT)
Dept: GENERAL RADIOLOGY | Age: 64
Discharge: HOME | End: 2019-01-04
Payer: MEDICARE

## 2019-01-02 DIAGNOSIS — Z78.0 ASYMPTOMATIC MENOPAUSAL STATE: ICD-10-CM

## 2019-01-02 DIAGNOSIS — Z00.00 INITIAL MEDICARE ANNUAL WELLNESS VISIT: ICD-10-CM

## 2019-01-02 PROCEDURE — 77080 DXA BONE DENSITY AXIAL: CPT

## 2019-01-04 ENCOUNTER — TELEPHONE (OUTPATIENT)
Dept: FAMILY MEDICINE CLINIC | Age: 64
End: 2019-01-04

## 2019-02-01 ENCOUNTER — OFFICE VISIT (OUTPATIENT)
Dept: FAMILY MEDICINE CLINIC | Age: 64
End: 2019-02-01
Payer: MEDICARE

## 2019-02-01 VITALS
RESPIRATION RATE: 14 BRPM | TEMPERATURE: 98.1 F | OXYGEN SATURATION: 97 % | DIASTOLIC BLOOD PRESSURE: 90 MMHG | SYSTOLIC BLOOD PRESSURE: 140 MMHG | HEART RATE: 65 BPM | BODY MASS INDEX: 32.96 KG/M2 | HEIGHT: 67 IN | WEIGHT: 210 LBS

## 2019-02-01 DIAGNOSIS — J06.9 VIRAL URI: Primary | ICD-10-CM

## 2019-02-01 PROCEDURE — 99213 OFFICE O/P EST LOW 20 MIN: CPT | Performed by: PHYSICIAN ASSISTANT

## 2019-02-01 RX ORDER — DEXTROMETHORPHAN HYDROBROMIDE AND PROMETHAZINE HYDROCHLORIDE 15; 6.25 MG/5ML; MG/5ML
5 SYRUP ORAL EVERY 6 HOURS PRN
Qty: 120 ML | Refills: 0 | Status: SHIPPED | OUTPATIENT
Start: 2019-02-01 | End: 2019-07-24

## 2019-03-14 ENCOUNTER — OFFICE VISIT (OUTPATIENT)
Dept: FAMILY MEDICINE CLINIC | Age: 64
End: 2019-03-14
Payer: MEDICARE

## 2019-03-14 VITALS
DIASTOLIC BLOOD PRESSURE: 72 MMHG | WEIGHT: 204 LBS | TEMPERATURE: 98 F | OXYGEN SATURATION: 98 % | BODY MASS INDEX: 32.02 KG/M2 | HEIGHT: 67 IN | SYSTOLIC BLOOD PRESSURE: 112 MMHG | HEART RATE: 71 BPM | RESPIRATION RATE: 16 BRPM

## 2019-03-14 DIAGNOSIS — M79.645 PAIN OF LEFT THUMB: ICD-10-CM

## 2019-03-14 DIAGNOSIS — E11.9 CONTROLLED TYPE 2 DIABETES MELLITUS WITHOUT COMPLICATION, WITHOUT LONG-TERM CURRENT USE OF INSULIN (HCC): ICD-10-CM

## 2019-03-14 DIAGNOSIS — E78.2 MIXED HYPERLIPIDEMIA: ICD-10-CM

## 2019-03-14 DIAGNOSIS — E03.9 HYPOTHYROIDISM, UNSPECIFIED TYPE: Primary | ICD-10-CM

## 2019-03-14 PROCEDURE — 99213 OFFICE O/P EST LOW 20 MIN: CPT | Performed by: FAMILY MEDICINE

## 2019-03-14 ASSESSMENT — ENCOUNTER SYMPTOMS
COUGH: 0
WHEEZING: 0

## 2019-03-14 ASSESSMENT — PATIENT HEALTH QUESTIONNAIRE - PHQ9
2. FEELING DOWN, DEPRESSED OR HOPELESS: 0
SUM OF ALL RESPONSES TO PHQ QUESTIONS 1-9: 0
SUM OF ALL RESPONSES TO PHQ QUESTIONS 1-9: 0
1. LITTLE INTEREST OR PLEASURE IN DOING THINGS: 0
SUM OF ALL RESPONSES TO PHQ9 QUESTIONS 1 & 2: 0

## 2019-05-02 ENCOUNTER — OFFICE VISIT (OUTPATIENT)
Dept: FAMILY MEDICINE CLINIC | Age: 64
End: 2019-05-02
Payer: MEDICARE

## 2019-05-02 VITALS
DIASTOLIC BLOOD PRESSURE: 70 MMHG | SYSTOLIC BLOOD PRESSURE: 124 MMHG | TEMPERATURE: 98.4 F | OXYGEN SATURATION: 97 % | HEIGHT: 68 IN | RESPIRATION RATE: 16 BRPM | WEIGHT: 212 LBS | BODY MASS INDEX: 32.13 KG/M2 | HEART RATE: 77 BPM

## 2019-05-02 DIAGNOSIS — M54.31 RIGHT SIDED SCIATICA: ICD-10-CM

## 2019-05-02 DIAGNOSIS — R10.9 ACUTE RIGHT FLANK PAIN: Primary | ICD-10-CM

## 2019-05-02 LAB
BILIRUBIN, POC: ABNORMAL
BLOOD URINE, POC: ABNORMAL
CLARITY, POC: CLEAR
COLOR, POC: YELLOW
GLUCOSE URINE, POC: ABNORMAL
KETONES, POC: ABNORMAL
LEUKOCYTE EST, POC: ABNORMAL
NITRITE, POC: ABNORMAL
PH, POC: 5.5
PROTEIN, POC: ABNORMAL
SPECIFIC GRAVITY, POC: >=1.03
UROBILINOGEN, POC: 0.2

## 2019-05-02 PROCEDURE — 96372 THER/PROPH/DIAG INJ SC/IM: CPT | Performed by: NURSE PRACTITIONER

## 2019-05-02 PROCEDURE — 81002 URINALYSIS NONAUTO W/O SCOPE: CPT | Performed by: NURSE PRACTITIONER

## 2019-05-02 PROCEDURE — 99213 OFFICE O/P EST LOW 20 MIN: CPT | Performed by: NURSE PRACTITIONER

## 2019-05-02 RX ORDER — KETOROLAC TROMETHAMINE 30 MG/ML
30 INJECTION, SOLUTION INTRAMUSCULAR; INTRAVENOUS ONCE
Status: COMPLETED | OUTPATIENT
Start: 2019-05-02 | End: 2019-05-02

## 2019-05-02 RX ADMIN — KETOROLAC TROMETHAMINE 30 MG: 30 INJECTION, SOLUTION INTRAMUSCULAR; INTRAVENOUS at 11:18

## 2019-05-02 ASSESSMENT — ENCOUNTER SYMPTOMS
EYE PAIN: 0
VOMITING: 0
STRIDOR: 0
ANAL BLEEDING: 0
PHOTOPHOBIA: 0
RECTAL PAIN: 0
ABDOMINAL PAIN: 0
CONSTIPATION: 0
SHORTNESS OF BREATH: 0
EYE REDNESS: 0
EYE DISCHARGE: 0
COUGH: 0
NAUSEA: 0
EYE ITCHING: 0
WHEEZING: 0
COLOR CHANGE: 0
DIARRHEA: 0
ABDOMINAL DISTENTION: 0
BLOOD IN STOOL: 0

## 2019-05-02 NOTE — PROGRESS NOTES
Jillian Oakley is a 70 y.o. female who presents today for   Chief Complaint   Patient presents with    Lower Back Pain     on right side, started 4 days ago. HPI    Pt presents today with c/o right sciatica pain and right flank pain that started 4 days ago. Pt does have a h/o sciatica, denies h/o kidney stones or recurrent UTI/Kidney Infections. Pt denies any recent/past injuries, denies urinary/bowel issues, denies numbness/tingling to BLE, pt is afebrile and tolerating PO well        [address]:  Patient's past medical, surgical, social and/or family history reviewed, updated in chart, and are non-contributory (unless otherwise stated). Medications and allergies also reviewed and updated in chart. Review of Systems  Review of Systems   Constitutional: Negative for appetite change, chills, diaphoresis, fatigue and fever. Eyes: Negative for photophobia, pain, discharge, redness, itching and visual disturbance. Respiratory: Negative for cough, shortness of breath, wheezing and stridor. Cardiovascular: Negative for chest pain, palpitations and leg swelling. Gastrointestinal: Negative for abdominal distention, abdominal pain, anal bleeding, blood in stool, constipation, diarrhea, nausea, rectal pain and vomiting. Genitourinary: Positive for flank pain (right). Negative for decreased urine volume, difficulty urinating, dysuria, enuresis, frequency, hematuria, pelvic pain, urgency, vaginal bleeding, vaginal discharge and vaginal pain. Musculoskeletal:        Right SI pain-h/o sciatica   Skin: Negative for color change, pallor and rash.        Physical Exam:    VS:  /70   Pulse 77   Temp 98.4 °F (36.9 °C) (Oral)   Resp 16   Ht 5' 7.5\" (1.715 m)   Wt 212 lb (96.2 kg)   SpO2 97%   BMI 32.71 kg/m²   LAST WEIGHT:  Wt Readings from Last 3 Encounters:   05/02/19 212 lb (96.2 kg)   03/14/19 204 lb (92.5 kg)   02/01/19 210 lb (95.3 kg)     Physical Exam   Constitutional: She appears well-developed and well-nourished. No distress. Eyes: Pupils are equal, round, and reactive to light. Conjunctivae and EOM are normal. Right eye exhibits no discharge. Left eye exhibits no discharge. Neck: Normal range of motion. Neck supple. Cardiovascular: Normal rate, regular rhythm, normal heart sounds and intact distal pulses. No peripheral edema   Pulmonary/Chest: Effort normal and breath sounds normal. No stridor. No respiratory distress. She has no wheezes. She has no rales. She exhibits no tenderness. Abdominal: Soft. Bowel sounds are normal. She exhibits no distension and no mass. There is no tenderness. There is no guarding. No suprapubic or CVA tenderness   Musculoskeletal:   Moderate pain present with palpation to right SI and right paraspinal muscles/flank region. No erythema/edema or deformities present. Minimal lumbar spine tenderness. Full ROM, slow in changing positions from sitting to standing, normal DTRs and pulses to BLE   Lymphadenopathy:     She has no cervical adenopathy. Skin: Skin is warm and dry. No rash noted. She is not diaphoretic. No erythema. No pallor. Nursing note and vitals reviewed. Assessment / Plan:      Kymberly Islas was seen today for lower back pain. Diagnoses and all orders for this visit:    Acute right flank pain  Discussed trace blood present in urine and possible Kidney Stone, pt did have UA in the past w/trace blood present  Advised to go to ED with any worsening pain or urinary issues as discussed  -     POCT Urinalysis no Micro-trace blood present  -     ketorolac (TORADOL) injection 30 mg    Right sided sciatica  Advised to alternate ice/heat to affected area  Advised on sciatica exercises  -     ketorolac (TORADOL) injection 30 mg         Call or go to ED immediately if symptoms worsen or persist.    Return if symptoms worsen or fail to improve. , or sooner if necessary.       Educational materials and/or home exercises printed for patient's review and were included in patient instructions on his/her After Visit Summary and given to patient at the end of visit. Counseled regarding above diagnosis, including possible risks and complications,  especially if left uncontrolled. Counseled regarding the possible side effects, risks, benefits and alternatives to treatment; patient and/or guardian verbalizes understanding, agrees, feels comfortable with and wishes to proceed with above treatment plan. Advised patient to call with any new medication issues, and read all Rx info from pharmacy to assure aware of all possible risks and side effects of medication before taking. Reviewed age and gender appropriate health screening exams and vaccinations. Advised patient regarding importance of keeping up with recommended health maintenance and to schedule as soon as possible if overdue, as this is important in assessing for undiagnosed pathology, especially cancer, as well as protecting against potentially harmful/life threatening disease. Patient and/or guardian verbalizes understanding and agrees with above counseling, assessment and plan. All questions answered.     Aleksandra Sierra, APRN - CNP

## 2019-05-03 ENCOUNTER — TELEPHONE (OUTPATIENT)
Dept: ORTHOPEDIC SURGERY | Age: 64
End: 2019-05-03

## 2019-05-03 DIAGNOSIS — R52 PAIN: Primary | ICD-10-CM

## 2019-05-07 ENCOUNTER — OFFICE VISIT (OUTPATIENT)
Dept: ORTHOPEDIC SURGERY | Age: 64
End: 2019-05-07
Payer: MEDICARE

## 2019-05-07 VITALS
HEIGHT: 67 IN | WEIGHT: 212 LBS | HEART RATE: 76 BPM | BODY MASS INDEX: 33.27 KG/M2 | RESPIRATION RATE: 22 BRPM | TEMPERATURE: 98.6 F | SYSTOLIC BLOOD PRESSURE: 127 MMHG | DIASTOLIC BLOOD PRESSURE: 73 MMHG

## 2019-05-07 DIAGNOSIS — M18.12 ARTHRITIS OF CARPOMETACARPAL (CMC) JOINT OF LEFT THUMB: Primary | ICD-10-CM

## 2019-05-07 PROCEDURE — L3924 HFO WITHOUT JOINTS PRE OTS: HCPCS | Performed by: ORTHOPAEDIC SURGERY

## 2019-05-07 PROCEDURE — 99203 OFFICE O/P NEW LOW 30 MIN: CPT | Performed by: ORTHOPAEDIC SURGERY

## 2019-05-07 PROCEDURE — 20605 DRAIN/INJ JOINT/BURSA W/O US: CPT | Performed by: ORTHOPAEDIC SURGERY

## 2019-05-07 RX ORDER — DEXAMETHASONE SODIUM PHOSPHATE 4 MG/ML
4 INJECTION, SOLUTION INTRA-ARTICULAR; INTRALESIONAL; INTRAMUSCULAR; INTRAVENOUS; SOFT TISSUE ONCE
Status: COMPLETED | OUTPATIENT
Start: 2019-05-07 | End: 2019-05-07

## 2019-05-07 RX ADMIN — DEXAMETHASONE SODIUM PHOSPHATE 4 MG: 4 INJECTION, SOLUTION INTRA-ARTICULAR; INTRALESIONAL; INTRAMUSCULAR; INTRAVENOUS; SOFT TISSUE at 13:24

## 2019-05-07 NOTE — PROGRESS NOTES
distress, and appears stated age  EYES:  Lids and lashes normal, pupils equal, round and reactive to light, extra ocular muscles intact, sclera clear, conjunctiva normal  ENT:  Normocephalic, without obvious abnormality, atraumatic, sinuses nontender on palpation, external ears without lesions, oral pharynx with moist mucus membranes, tonsils without erythema or exudates, gums normal and good dentition. NECK:  Supple, symmetrical, trachea midline, no adenopathy, thyroid symmetric, not enlarged and no tenderness, skin normal    NEUROLOGIC:  Awake, alert, oriented to name, place and time. Cranial nerves II-XII are grossly intact. Motor is 5 out of 5 bilaterally. Sensory is intact.  gait is normal.  MUSCULOSKELETAL:    Left upper extremity: Nontender about the shoulder and elbow. Negative Tinel's at the cubital and carpal tunnel. Negative Phalen's. Positive CMC grind test. Negative extends. Negative triggering of the thumb in his middle ring and small fingers. Negative atrophy. APB strength 5/5. Negative Wartenberg's cross finger testing. 2+ radial pulse. Radial, median nerves intact to light touch otherwise neurovascular intact. DATA:    CBC:   Lab Results   Component Value Date    WBC 5.4 12/21/2018    RBC 3.54 12/21/2018    HGB 11.6 12/21/2018    HCT 36.1 12/21/2018    .0 12/21/2018    MCH 32.8 12/21/2018    MCHC 32.1 12/21/2018    RDW 13.8 12/21/2018     12/21/2018    MPV 10.2 12/21/2018     PT/INR:  No results found for: PROTIME, INR    Radiology Review:  X-rays of the left hand obtained today in the office AP lateral and obliques demonstrate Doris Osler stage II basal joint arthritis. Impression office x-rays: Left thumb basal joint arthritis    IMPRESSION:  · Left thumb arthritis    PLAN:  Today's findings were explained to patient. Recommended cortisone injection and bracing. This fails discussed briefly CMC arthroplasty. All questions answered.       Procedure Note Wrist Thumb CMC Cortisone Injection    The left wrist thumb CMC joint was identified as the injection site. The risk and benefits of a cortisone injection were explained and the patient consented to the injection. Under sterile conditions, the wrist was injected with a mixture of 1 mL of 1% Lidocaine and 1 mL of 4 mg/mL Dexamethasone without complication.  A sterile bandage was applied

## 2019-05-12 DIAGNOSIS — E03.9 HYPOTHYROIDISM, UNSPECIFIED TYPE: Chronic | ICD-10-CM

## 2019-05-14 RX ORDER — LEVOTHYROXINE SODIUM 0.07 MG/1
TABLET ORAL
Qty: 30 TABLET | Refills: 0 | Status: SHIPPED | OUTPATIENT
Start: 2019-05-14 | End: 2019-07-01 | Stop reason: SDUPTHER

## 2019-06-19 DIAGNOSIS — E11.9 CONTROLLED TYPE 2 DIABETES MELLITUS WITHOUT COMPLICATION, WITHOUT LONG-TERM CURRENT USE OF INSULIN (HCC): Primary | ICD-10-CM

## 2019-06-19 RX ORDER — GLUCOSAMINE HCL/CHONDROITIN SU 500-400 MG
CAPSULE ORAL
Qty: 100 STRIP | Refills: 3 | Status: SHIPPED
Start: 2019-06-19 | End: 2020-03-06 | Stop reason: SDUPTHER

## 2019-07-01 DIAGNOSIS — E03.9 HYPOTHYROIDISM, UNSPECIFIED TYPE: Chronic | ICD-10-CM

## 2019-07-02 RX ORDER — LEVOTHYROXINE SODIUM 0.07 MG/1
75 TABLET ORAL DAILY
Qty: 30 TABLET | Refills: 0 | Status: SHIPPED | OUTPATIENT
Start: 2019-07-02 | End: 2019-07-24 | Stop reason: SDUPTHER

## 2019-07-24 ENCOUNTER — OFFICE VISIT (OUTPATIENT)
Dept: FAMILY MEDICINE CLINIC | Age: 64
End: 2019-07-24
Payer: MEDICARE

## 2019-07-24 VITALS
DIASTOLIC BLOOD PRESSURE: 74 MMHG | BODY MASS INDEX: 32.49 KG/M2 | SYSTOLIC BLOOD PRESSURE: 128 MMHG | HEIGHT: 67 IN | WEIGHT: 207 LBS | RESPIRATION RATE: 18 BRPM | OXYGEN SATURATION: 98 % | HEART RATE: 76 BPM

## 2019-07-24 DIAGNOSIS — Z23 NEED FOR SHINGLES VACCINE: ICD-10-CM

## 2019-07-24 DIAGNOSIS — Z76.0 MEDICATION REFILL: ICD-10-CM

## 2019-07-24 DIAGNOSIS — H25.9 SENILE CATARACT OF LEFT EYE, UNSPECIFIED AGE-RELATED CATARACT TYPE: ICD-10-CM

## 2019-07-24 DIAGNOSIS — E11.9 CONTROLLED TYPE 2 DIABETES MELLITUS WITHOUT COMPLICATION, WITHOUT LONG-TERM CURRENT USE OF INSULIN (HCC): Primary | Chronic | ICD-10-CM

## 2019-07-24 DIAGNOSIS — E03.9 HYPOTHYROIDISM, UNSPECIFIED TYPE: Chronic | ICD-10-CM

## 2019-07-24 DIAGNOSIS — E78.2 MIXED HYPERLIPIDEMIA: Chronic | ICD-10-CM

## 2019-07-24 DIAGNOSIS — Z23 NEED FOR PNEUMOCOCCAL VACCINATION: ICD-10-CM

## 2019-07-24 DIAGNOSIS — M17.0 PRIMARY OSTEOARTHRITIS OF BOTH KNEES: ICD-10-CM

## 2019-07-24 DIAGNOSIS — I10 ESSENTIAL HYPERTENSION: Chronic | ICD-10-CM

## 2019-07-24 LAB
CHP ED QC CHECK: NORMAL
CREATININE URINE POCT: ABNORMAL
GLUCOSE BLD-MCNC: 89 MG/DL
HBA1C MFR BLD: 6.4 %
MICROALBUMIN/CREAT 24H UR: ABNORMAL MG/G{CREAT}
MICROALBUMIN/CREAT UR-RTO: ABNORMAL

## 2019-07-24 PROCEDURE — 83036 HEMOGLOBIN GLYCOSYLATED A1C: CPT | Performed by: FAMILY MEDICINE

## 2019-07-24 PROCEDURE — 99213 OFFICE O/P EST LOW 20 MIN: CPT | Performed by: FAMILY MEDICINE

## 2019-07-24 PROCEDURE — 82044 UR ALBUMIN SEMIQUANTITATIVE: CPT | Performed by: FAMILY MEDICINE

## 2019-07-24 PROCEDURE — 82962 GLUCOSE BLOOD TEST: CPT | Performed by: FAMILY MEDICINE

## 2019-07-24 RX ORDER — RANITIDINE 150 MG/1
150 TABLET ORAL NIGHTLY PRN
Qty: 30 TABLET | Refills: 5 | Status: SHIPPED
Start: 2019-07-24 | End: 2020-03-06 | Stop reason: ALTCHOICE

## 2019-07-24 RX ORDER — EZETIMIBE 10 MG/1
10 TABLET ORAL DAILY
Qty: 30 TABLET | Refills: 5 | Status: SHIPPED
Start: 2019-07-24 | End: 2020-03-06 | Stop reason: ALTCHOICE

## 2019-07-24 RX ORDER — UBIDECARENONE 100 MG
100 CAPSULE ORAL DAILY
Qty: 90 CAPSULE | Refills: 3 | Status: SHIPPED
Start: 2019-07-24 | End: 2020-03-06 | Stop reason: ALTCHOICE

## 2019-07-24 RX ORDER — POLYETHYLENE GLYCOL 3350 17 G/17G
POWDER, FOR SOLUTION ORAL
Qty: 510 G | Refills: 11 | Status: SHIPPED
Start: 2019-07-24 | End: 2020-06-19 | Stop reason: SDUPTHER

## 2019-07-24 RX ORDER — ALBUTEROL SULFATE 90 UG/1
2 AEROSOL, METERED RESPIRATORY (INHALATION) EVERY 6 HOURS PRN
Qty: 1 INHALER | Refills: 1 | Status: CANCELLED | OUTPATIENT
Start: 2019-07-24 | End: 2020-11-28

## 2019-07-24 RX ORDER — IBUPROFEN 800 MG/1
800 TABLET ORAL EVERY 8 HOURS PRN
Qty: 30 TABLET | Refills: 5 | Status: SHIPPED
Start: 2019-07-24 | End: 2020-03-06 | Stop reason: SDUPTHER

## 2019-07-24 RX ORDER — LEVOTHYROXINE SODIUM 0.07 MG/1
75 TABLET ORAL DAILY
Qty: 30 TABLET | Refills: 5 | Status: SHIPPED
Start: 2019-07-24 | End: 2020-03-06 | Stop reason: SDUPTHER

## 2019-07-24 RX ORDER — CHLORTHALIDONE 25 MG/1
25 TABLET ORAL DAILY
Qty: 30 TABLET | Refills: 5 | Status: SHIPPED
Start: 2019-07-24 | End: 2020-03-06 | Stop reason: SDUPTHER

## 2019-07-24 ASSESSMENT — ENCOUNTER SYMPTOMS
SHORTNESS OF BREATH: 0
CONSTIPATION: 0
DIARRHEA: 0
WHEEZING: 0

## 2019-07-24 ASSESSMENT — PATIENT HEALTH QUESTIONNAIRE - PHQ9
1. LITTLE INTEREST OR PLEASURE IN DOING THINGS: 0
2. FEELING DOWN, DEPRESSED OR HOPELESS: 0
SUM OF ALL RESPONSES TO PHQ9 QUESTIONS 1 & 2: 0
SUM OF ALL RESPONSES TO PHQ QUESTIONS 1-9: 0
SUM OF ALL RESPONSES TO PHQ QUESTIONS 1-9: 0

## 2019-07-24 NOTE — PROGRESS NOTES
file.    Social Hx:  Reviewed with patient  Social History     Tobacco Use    Smoking status: Never Smoker    Smokeless tobacco: Never Used   Substance Use Topics    Alcohol use: No     Alcohol/week: 0.0 standard drinks       Immunization History   Administered Date(s) Administered    Influenza Vaccine, unspecified formulation 11/17/2008    Pneumococcal Polysaccharide ([id number]) 12/17/2008, 11/20/2014, 10/29/2015    Tdap (Boostrix, Adacel) 08/10/2008, 10/05/2012, 08/15/2017       Review of Systems  Review of Systems   Constitutional: Negative for chills, diaphoresis and fever. Eyes: Negative for visual disturbance. Respiratory: Negative for shortness of breath and wheezing. Cardiovascular: Negative for chest pain. Gastrointestinal: Negative for constipation and diarrhea. Neurological: Negative for dizziness. Psychiatric/Behavioral: Negative for dysphoric mood. PE:  VS:  /74   Pulse 76   Resp 18   Ht 5' 7\" (1.702 m)   Wt 207 lb (93.9 kg)   SpO2 98%   Breastfeeding? No   BMI 32.42 kg/m²   Physical Exam   Constitutional: She is oriented to person, place, and time. She appears well-developed and well-nourished. HENT:   Head: Normocephalic and atraumatic. Cardiovascular: Normal rate and regular rhythm. Exam reveals no gallop and no friction rub. No murmur heard. Pulmonary/Chest: Effort normal and breath sounds normal. She has no wheezes. She has no rales. Musculoskeletal: She exhibits no edema. Neurological: She is alert and oriented to person, place, and time. Skin: Skin is warm and dry. Assessment/Plan:  Eryn Oliver was seen today for diabetes and hypothyroidism.     Diagnoses and all orders for this visit:    Controlled type 2 diabetes mellitus without complication, without long-term current use of insulin ([address]  Cont diabetic diet  Work on weight loss  -     POCT glycosylated hemoglobin (Hb A1C)  -     POCT Glucose  -     POCT microalbumin    Need for shingles

## 2019-10-08 ENCOUNTER — OFFICE VISIT (OUTPATIENT)
Dept: FAMILY MEDICINE CLINIC | Age: 64
End: 2019-10-08
Payer: MEDICARE

## 2019-10-08 VITALS
OXYGEN SATURATION: 98 % | BODY MASS INDEX: 33.24 KG/M2 | TEMPERATURE: 98.2 F | RESPIRATION RATE: 20 BRPM | HEART RATE: 70 BPM | DIASTOLIC BLOOD PRESSURE: 86 MMHG | HEIGHT: 67 IN | WEIGHT: 211.75 LBS | SYSTOLIC BLOOD PRESSURE: 138 MMHG

## 2019-10-08 DIAGNOSIS — K52.9 GASTROENTERITIS: Primary | ICD-10-CM

## 2019-10-08 PROCEDURE — 99213 OFFICE O/P EST LOW 20 MIN: CPT | Performed by: PHYSICIAN ASSISTANT

## 2019-10-08 RX ORDER — ONDANSETRON 4 MG/1
4 TABLET, FILM COATED ORAL 3 TIMES DAILY PRN
Qty: 15 TABLET | Refills: 0 | Status: SHIPPED | OUTPATIENT
Start: 2019-10-08 | End: 2020-01-08 | Stop reason: SDUPTHER

## 2019-10-25 ENCOUNTER — OFFICE VISIT (OUTPATIENT)
Dept: FAMILY MEDICINE CLINIC | Age: 64
End: 2019-10-25
Payer: MEDICARE

## 2019-10-25 VITALS
WEIGHT: 215 LBS | BODY MASS INDEX: 33.74 KG/M2 | HEIGHT: 67 IN | HEART RATE: 74 BPM | OXYGEN SATURATION: 100 % | DIASTOLIC BLOOD PRESSURE: 64 MMHG | SYSTOLIC BLOOD PRESSURE: 138 MMHG | RESPIRATION RATE: 16 BRPM

## 2019-10-25 DIAGNOSIS — Q82.5 CONGENITAL NEVUS: ICD-10-CM

## 2019-10-25 DIAGNOSIS — M54.50 ACUTE RIGHT-SIDED LOW BACK PAIN WITHOUT SCIATICA: ICD-10-CM

## 2019-10-25 DIAGNOSIS — E11.9 CONTROLLED TYPE 2 DIABETES MELLITUS WITHOUT COMPLICATION, WITHOUT LONG-TERM CURRENT USE OF INSULIN (HCC): Primary | ICD-10-CM

## 2019-10-25 DIAGNOSIS — L81.9 HYPERPIGMENTED SKIN LESION: ICD-10-CM

## 2019-10-25 LAB
APPEARANCE FLUID: NORMAL
BILIRUBIN, POC: NORMAL
BLOOD URINE, POC: NORMAL
CHP ED QC CHECK: NORMAL
CLARITY, POC: CLEAR
COLOR, POC: YELLOW
GLUCOSE BLD-MCNC: 129 MG/DL
GLUCOSE URINE, POC: NORMAL
HBA1C MFR BLD: 6.5 %
KETONES, POC: NORMAL
LEUKOCYTE EST, POC: NORMAL
NITRITE, POC: NORMAL
PH, POC: 6
PROTEIN, POC: NORMAL
SPECIFIC GRAVITY, POC: 1.01
UROBILINOGEN, POC: 0.2

## 2019-10-25 PROCEDURE — 82962 GLUCOSE BLOOD TEST: CPT | Performed by: FAMILY MEDICINE

## 2019-10-25 PROCEDURE — 81002 URINALYSIS NONAUTO W/O SCOPE: CPT | Performed by: FAMILY MEDICINE

## 2019-10-25 PROCEDURE — 83036 HEMOGLOBIN GLYCOSYLATED A1C: CPT | Performed by: FAMILY MEDICINE

## 2019-10-25 PROCEDURE — 99213 OFFICE O/P EST LOW 20 MIN: CPT | Performed by: FAMILY MEDICINE

## 2019-10-25 ASSESSMENT — ENCOUNTER SYMPTOMS
COUGH: 0
BACK PAIN: 1
WHEEZING: 0

## 2019-12-26 ENCOUNTER — OFFICE VISIT (OUTPATIENT)
Dept: FAMILY MEDICINE CLINIC | Age: 64
End: 2019-12-26
Payer: MEDICARE

## 2019-12-26 VITALS
DIASTOLIC BLOOD PRESSURE: 76 MMHG | BODY MASS INDEX: 33.43 KG/M2 | TEMPERATURE: 97 F | HEIGHT: 67 IN | SYSTOLIC BLOOD PRESSURE: 124 MMHG | HEART RATE: 92 BPM | WEIGHT: 213 LBS | RESPIRATION RATE: 18 BRPM

## 2019-12-26 DIAGNOSIS — M54.50 ACUTE RIGHT-SIDED LOW BACK PAIN, UNSPECIFIED WHETHER SCIATICA PRESENT: Primary | ICD-10-CM

## 2019-12-26 DIAGNOSIS — N39.0 URINARY TRACT INFECTION IN FEMALE: ICD-10-CM

## 2019-12-26 LAB
BILIRUBIN, POC: ABNORMAL
BLOOD URINE, POC: ABNORMAL
CLARITY, POC: CLEAR
COLOR, POC: YELLOW
GLUCOSE URINE, POC: ABNORMAL
KETONES, POC: ABNORMAL
LEUKOCYTE EST, POC: ABNORMAL
NITRITE, POC: ABNORMAL
PH, POC: 6
PROTEIN, POC: ABNORMAL
SPECIFIC GRAVITY, POC: 1.02
UROBILINOGEN, POC: ABNORMAL

## 2019-12-26 PROCEDURE — 99213 OFFICE O/P EST LOW 20 MIN: CPT | Performed by: NURSE PRACTITIONER

## 2019-12-26 PROCEDURE — 81002 URINALYSIS NONAUTO W/O SCOPE: CPT | Performed by: NURSE PRACTITIONER

## 2019-12-26 RX ORDER — NITROFURANTOIN 25; 75 MG/1; MG/1
100 CAPSULE ORAL 2 TIMES DAILY
Qty: 20 CAPSULE | Refills: 0 | Status: SHIPPED | OUTPATIENT
Start: 2019-12-26 | End: 2020-01-05

## 2020-01-03 ENCOUNTER — NURSE TRIAGE (OUTPATIENT)
Dept: OTHER | Facility: CLINIC | Age: 65
End: 2020-01-03

## 2020-01-03 ENCOUNTER — TELEPHONE (OUTPATIENT)
Dept: ADMINISTRATIVE | Age: 65
End: 2020-01-03

## 2020-01-08 ENCOUNTER — OFFICE VISIT (OUTPATIENT)
Dept: FAMILY MEDICINE CLINIC | Age: 65
End: 2020-01-08
Payer: MEDICARE

## 2020-01-08 ENCOUNTER — HOSPITAL ENCOUNTER (OUTPATIENT)
Age: 65
Discharge: HOME | End: 2020-01-10
Payer: MEDICARE

## 2020-01-08 VITALS
WEIGHT: 206 LBS | RESPIRATION RATE: 16 BRPM | OXYGEN SATURATION: 98 % | DIASTOLIC BLOOD PRESSURE: 62 MMHG | BODY MASS INDEX: 32.33 KG/M2 | SYSTOLIC BLOOD PRESSURE: 112 MMHG | HEIGHT: 67 IN | HEART RATE: 88 BPM | TEMPERATURE: 98.3 F

## 2020-01-08 LAB
BILIRUBIN, POC: ABNORMAL
BLOOD URINE, POC: ABNORMAL
CLARITY, POC: CLEAR
COLOR, POC: YELLOW
GLUCOSE URINE, POC: ABNORMAL
INFLUENZA A ANTIGEN, POC: NORMAL
INFLUENZA B ANTIGEN, POC: NORMAL
KETONES, POC: ABNORMAL
LEUKOCYTE EST, POC: ABNORMAL
NITRITE, POC: ABNORMAL
PH, POC: 5.5
PROTEIN, POC: ABNORMAL
SPECIFIC GRAVITY, POC: 1.01
UROBILINOGEN, POC: 0.2

## 2020-01-08 PROCEDURE — 87088 URINE BACTERIA CULTURE: CPT

## 2020-01-08 PROCEDURE — 99214 OFFICE O/P EST MOD 30 MIN: CPT | Performed by: FAMILY MEDICINE

## 2020-01-08 PROCEDURE — 87804 INFLUENZA ASSAY W/OPTIC: CPT | Performed by: FAMILY MEDICINE

## 2020-01-08 PROCEDURE — 94640 AIRWAY INHALATION TREATMENT: CPT | Performed by: FAMILY MEDICINE

## 2020-01-08 PROCEDURE — 81002 URINALYSIS NONAUTO W/O SCOPE: CPT | Performed by: FAMILY MEDICINE

## 2020-01-08 RX ORDER — DEXTROMETHORPHAN HYDROBROMIDE AND PROMETHAZINE HYDROCHLORIDE 15; 6.25 MG/5ML; MG/5ML
5 SYRUP ORAL EVERY 6 HOURS PRN
Qty: 120 ML | Refills: 0 | Status: SHIPPED
Start: 2020-01-08 | End: 2020-06-19

## 2020-01-08 RX ORDER — ALBUTEROL SULFATE 90 UG/1
AEROSOL, METERED RESPIRATORY (INHALATION)
COMMUNITY
End: 2020-03-06 | Stop reason: ALTCHOICE

## 2020-01-08 RX ORDER — ONDANSETRON 4 MG/1
4 TABLET, FILM COATED ORAL 3 TIMES DAILY PRN
Qty: 15 TABLET | Refills: 0 | Status: SHIPPED
Start: 2020-01-08 | End: 2021-06-14 | Stop reason: SDUPTHER

## 2020-01-08 RX ORDER — BROMPHENIRAMINE MALEATE, PSEUDOEPHEDRINE HYDROCHLORIDE, AND DEXTROMETHORPHAN HYDROBROMIDE 2; 30; 10 MG/5ML; MG/5ML; MG/5ML
10 SYRUP ORAL
COMMUNITY
End: 2020-01-08

## 2020-01-08 RX ADMIN — Medication 0.5 MG: at 16:22

## 2020-01-08 ASSESSMENT — PATIENT HEALTH QUESTIONNAIRE - PHQ9
SUM OF ALL RESPONSES TO PHQ QUESTIONS 1-9: 0
SUM OF ALL RESPONSES TO PHQ9 QUESTIONS 1 & 2: 0
SUM OF ALL RESPONSES TO PHQ QUESTIONS 1-9: 0
2. FEELING DOWN, DEPRESSED OR HOPELESS: 0
1. LITTLE INTEREST OR PLEASURE IN DOING THINGS: 0

## 2020-01-08 NOTE — PROGRESS NOTES
MCG/ACT AERS inhaler Inhale 2 puffs into the lungs daily 1 Inhaler 5    ibuprofen (ADVIL;MOTRIN) 800 MG tablet Take 1 tablet by mouth every 8 hours as needed for Pain 30 tablet 5    coenzyme Q10 100 MG CAPS capsule Take 1 capsule by mouth daily 90 capsule 3    blood glucose monitor strips Test 1 times a day & as needed for symptoms of irregular blood glucose. 100 strip 3    triamcinolone (KENALOG) 0.1 % cream Apply topically 2 times daily to lower extremities up to 2 weeks. 1 Tube 1    ammonium lactate (AMLACTIN) 12 % lotion APPLY ONCE DAILY 225 g 5    Cholecalciferol (VITAMIN D3) 2000 units CAPS TK 1 C PO QD  5    levocetirizine (XYZAL) 5 MG tablet TK 1 T PO QHS  0    ketotifen (ZADITOR) 0.025 % ophthalmic solution INT 1 GTT IN OU BID PRF ALLERGIES  5    aliskiren (TEKTURNA) 300 MG tablet Take 300 mg by mouth daily. No current facility-administered medications for this visit. Past Medical/Surgical Hx;  Reviewed with patient         Diagnosis Date    Bronchitis     Hyperlipidemia     Hypertension     Thyroid disease      Past Surgical History:   Procedure Laterality Date    COLONOSCOPY  1/21/2015    UPPER GASTROINTESTINAL ENDOSCOPY  1/21/2015       Past Family Hx:  Reviewed with patient  No family history on file. Social Hx:  Reviewed with patient  Social History     Tobacco Use    Smoking status: Never Smoker    Smokeless tobacco: Never Used   Substance Use Topics    Alcohol use: No     Alcohol/week: 0.0 standard drinks       Immunization History   Administered Date(s) Administered    Influenza Vaccine, unspecified formulation 11/17/2008    Pneumococcal Polysaccharide ([id number]) 12/17/2008, 11/20/2014, 10/29/2015    Tdap (Boostrix, Adacel) 08/10/2008, 10/05/2012, 08/15/2017       Review of Systems  Review of Systems    PE:  VS:  /62   Pulse 88   Temp 98.3 °F (36.8 °C) (Oral)   Resp 16   Ht 5' 7\" (1.702 m)   Wt 206 lb (93.4 kg)   SpO2 98%   Breastfeeding?  No POCT Influenza A/B Antigen    Primary osteoarthritis of both knees  -     Handicap Placard MISC; by Does not apply route Patient cannot walk 200 ft without stopping to rest.    Expiration 1/2023    Other orders  -     URINE CULTURE        Return in about 3 months (around 4/8/2020). Advised patient to call with any new medication issues. Allquestions answered.   Call or go to ED immediately if symptoms worsen or persist.

## 2020-01-09 ENCOUNTER — HOSPITAL ENCOUNTER (OUTPATIENT)
Age: 65
Discharge: HOME | End: 2020-01-09
Payer: MEDICARE

## 2020-01-09 LAB
ALBUMIN SERPL-MCNC: 4.5 G/DL (ref 3.5–5.2)
ALP BLD-CCNC: 123 U/L (ref 35–104)
ALT SERPL-CCNC: 30 U/L (ref 0–32)
ANION GAP SERPL CALCULATED.3IONS-SCNC: 17 MMOL/L (ref 7–16)
AST SERPL-CCNC: 30 U/L (ref 0–31)
BASOPHILS ABSOLUTE: 0 E9/L (ref 0–0.2)
BASOPHILS RELATIVE PERCENT: 0 % (ref 0–2)
BILIRUB SERPL-MCNC: 0.3 MG/DL (ref 0–1.2)
BUN BLDV-MCNC: 27 MG/DL (ref 8–23)
CALCIUM SERPL-MCNC: 10.3 MG/DL (ref 8.6–10.2)
CHLORIDE BLD-SCNC: 97 MMOL/L (ref 98–107)
CHOLESTEROL, TOTAL: 344 MG/DL (ref 0–199)
CO2: 27 MMOL/L (ref 22–29)
CREAT SERPL-MCNC: 1.1 MG/DL (ref 0.5–1)
EOSINOPHILS ABSOLUTE: 0.56 E9/L (ref 0.05–0.5)
EOSINOPHILS RELATIVE PERCENT: 13 % (ref 0–6)
GFR AFRICAN AMERICAN: >60
GFR NON-AFRICAN AMERICAN: >60 ML/MIN/1.73
GLUCOSE BLD-MCNC: 107 MG/DL (ref 74–99)
HCT VFR BLD CALC: 38.4 % (ref 34–48)
HDLC SERPL-MCNC: 51 MG/DL
HEMOGLOBIN: 12.5 G/DL (ref 11.5–15.5)
LDL CHOLESTEROL CALCULATED: 264 MG/DL (ref 0–99)
LYMPHOCYTES ABSOLUTE: 2.28 E9/L (ref 1.5–4)
LYMPHOCYTES RELATIVE PERCENT: 53 % (ref 20–42)
MCH RBC QN AUTO: 31.5 PG (ref 26–35)
MCHC RBC AUTO-ENTMCNC: 32.6 % (ref 32–34.5)
MCV RBC AUTO: 96.7 FL (ref 80–99.9)
MONOCYTES ABSOLUTE: 0.34 E9/L (ref 0.1–0.95)
MONOCYTES RELATIVE PERCENT: 8 % (ref 2–12)
NEUTROPHILS ABSOLUTE: 1.12 E9/L (ref 1.8–7.3)
NEUTROPHILS RELATIVE PERCENT: 26 % (ref 43–80)
PDW BLD-RTO: 13.1 FL (ref 11.5–15)
PLATELET # BLD: 397 E9/L (ref 130–450)
PMV BLD AUTO: 9.4 FL (ref 7–12)
POTASSIUM SERPL-SCNC: 3.4 MMOL/L (ref 3.5–5)
RBC # BLD: 3.97 E12/L (ref 3.5–5.5)
SODIUM BLD-SCNC: 141 MMOL/L (ref 132–146)
TOTAL PROTEIN: 8.1 G/DL (ref 6.4–8.3)
TRIGL SERPL-MCNC: 145 MG/DL (ref 0–149)
VLDLC SERPL CALC-MCNC: 29 MG/DL
WBC # BLD: 4.3 E9/L (ref 4.5–11.5)

## 2020-01-09 PROCEDURE — 80053 COMPREHEN METABOLIC PANEL: CPT

## 2020-01-09 PROCEDURE — 36415 COLL VENOUS BLD VENIPUNCTURE: CPT

## 2020-01-09 PROCEDURE — 80061 LIPID PANEL: CPT

## 2020-01-09 PROCEDURE — 85025 COMPLETE CBC W/AUTO DIFF WBC: CPT

## 2020-01-11 LAB — URINE CULTURE, ROUTINE: NORMAL

## 2020-01-13 ENCOUNTER — TELEPHONE (OUTPATIENT)
Dept: FAMILY MEDICINE CLINIC | Age: 65
End: 2020-01-13

## 2020-01-31 ENCOUNTER — TELEPHONE (OUTPATIENT)
Dept: FAMILY MEDICINE CLINIC | Age: 65
End: 2020-01-31

## 2020-01-31 NOTE — TELEPHONE ENCOUNTER
Spoke with patient she states she has heard good things about  and prefers to see him order pended if agreeable please sign THANKS!

## 2020-01-31 NOTE — TELEPHONE ENCOUNTER
Pt called and would like you to change her referral for endocrinology to Dr Greene More in [facility]. It's to hard for her to get to [place].

## 2020-02-25 ENCOUNTER — HOSPITAL ENCOUNTER (OUTPATIENT)
Age: 65
Discharge: HOME | End: 2020-02-25
Payer: MEDICARE

## 2020-02-25 LAB
T3 FREE: 3.3 PG/ML (ref 2–4.4)
T4 FREE: 0.8 NG/DL (ref 0.93–1.7)
TSH SERPL DL<=0.05 MIU/L-ACNC: 9.36 UIU/ML (ref 0.27–4.2)

## 2020-02-25 PROCEDURE — 36415 COLL VENOUS BLD VENIPUNCTURE: CPT

## 2020-02-25 PROCEDURE — 84481 FREE ASSAY (FT-3): CPT

## 2020-02-25 PROCEDURE — 84443 ASSAY THYROID STIM HORMONE: CPT

## 2020-02-25 PROCEDURE — 84439 ASSAY OF FREE THYROXINE: CPT

## 2020-03-05 ENCOUNTER — HOSPITAL ENCOUNTER (OUTPATIENT)
Dept: GENERAL RADIOLOGY | Age: 65
Discharge: HOME | End: 2020-03-07
Payer: MEDICARE

## 2020-03-05 ENCOUNTER — HOSPITAL ENCOUNTER (OUTPATIENT)
Age: 65
Discharge: HOME | End: 2020-03-07
Payer: MEDICARE

## 2020-03-05 PROCEDURE — 72110 X-RAY EXAM L-2 SPINE 4/>VWS: CPT

## 2020-03-05 PROCEDURE — 74018 RADEX ABDOMEN 1 VIEW: CPT

## 2020-03-06 ENCOUNTER — OFFICE VISIT (OUTPATIENT)
Dept: FAMILY MEDICINE CLINIC | Age: 65
End: 2020-03-06
Payer: MEDICARE

## 2020-03-06 VITALS
HEART RATE: 78 BPM | RESPIRATION RATE: 16 BRPM | WEIGHT: 203 LBS | OXYGEN SATURATION: 98 % | SYSTOLIC BLOOD PRESSURE: 144 MMHG | DIASTOLIC BLOOD PRESSURE: 76 MMHG | TEMPERATURE: 98 F | HEIGHT: 67 IN | BODY MASS INDEX: 31.86 KG/M2

## 2020-03-06 LAB — HBA1C MFR BLD: 6.3 %

## 2020-03-06 PROCEDURE — 99214 OFFICE O/P EST MOD 30 MIN: CPT | Performed by: FAMILY MEDICINE

## 2020-03-06 PROCEDURE — 83036 HEMOGLOBIN GLYCOSYLATED A1C: CPT | Performed by: FAMILY MEDICINE

## 2020-03-06 RX ORDER — LANCETS 33 GAUGE
EACH MISCELLANEOUS
Qty: 100 EACH | Refills: 5 | Status: SHIPPED
Start: 2020-03-06 | End: 2020-05-29 | Stop reason: SDUPTHER

## 2020-03-06 RX ORDER — EZETIMIBE 10 MG/1
10 TABLET ORAL DAILY
Qty: 30 TABLET | Refills: 5 | Status: CANCELLED | OUTPATIENT
Start: 2020-03-06

## 2020-03-06 RX ORDER — GLUCOSAMINE HCL/CHONDROITIN SU 500-400 MG
CAPSULE ORAL
Qty: 100 STRIP | Refills: 5 | Status: SHIPPED
Start: 2020-03-06 | End: 2020-05-29 | Stop reason: SDUPTHER

## 2020-03-06 RX ORDER — FLUCONAZOLE 150 MG/1
150 TABLET ORAL ONCE
Qty: 1 TABLET | Refills: 0 | Status: SHIPPED | OUTPATIENT
Start: 2020-03-06 | End: 2020-03-06

## 2020-03-06 RX ORDER — CHLORTHALIDONE 25 MG/1
25 TABLET ORAL DAILY
Qty: 30 TABLET | Refills: 5 | Status: SHIPPED
Start: 2020-03-06 | End: 2021-04-01

## 2020-03-06 RX ORDER — IBUPROFEN 800 MG/1
800 TABLET ORAL EVERY 8 HOURS PRN
Qty: 30 TABLET | Refills: 1 | Status: SHIPPED
Start: 2020-03-06 | End: 2020-06-19 | Stop reason: SDUPTHER

## 2020-03-06 RX ORDER — LEVOTHYROXINE SODIUM 0.07 MG/1
75 TABLET ORAL DAILY
Qty: 30 TABLET | Refills: 5 | Status: SHIPPED
Start: 2020-03-06 | End: 2021-04-30 | Stop reason: SDUPTHER

## 2020-03-06 RX ORDER — EVOLOCUMAB 140 MG/ML
140 INJECTION, SOLUTION SUBCUTANEOUS
COMMUNITY
Start: 2020-03-03 | End: 2021-04-15

## 2020-03-06 ASSESSMENT — ENCOUNTER SYMPTOMS
WHEEZING: 0
CONSTIPATION: 0
SHORTNESS OF BREATH: 0
DIARRHEA: 0

## 2020-03-06 NOTE — PROGRESS NOTES
3/6/2020    Angeles Cox is a 75 y.o. female here for   Chief Complaint   Patient presents with    Diabetes     3/6/20 FBS at home 112    Hypertension    Hypothyroidism    Results     1/9/2020 labs done     Here for management of chronic conditionsbut also having back pain  Radiating to right hip and right leg  Worse with weight bearing  Unable to get comfortable  Somewhat improved with heat/ nsaids  Saw dr ness yesterday who ordered XR  Impression   Stable grade 1 anterolisthesis L5-S1. There are mild to moderate degenerative disc space disease identified   at L1-L2 and L5-S1. Mild to moderate degenerative disease throughout the lumbar facets     Dry cough since yesterday  No recent travel  No other complaints      Regarding hypertension. Patient is  monitoring home blood pressures. Typically in the 110's. Cardiovascular risk factors: advanced age (older than 54 for men, 72 for women), diabetes mellitus, dyslipidemia, hypertension, obesity (BMI >= 30 kg/m2) and sedentary lifestyle. Patient does not smoke. Currently on chlorthalidone 25 mg daily, tekturna. Taking as prescribed. No adverse effects. Today,  BP: (!) 144/76 118/62's and she is asymptomatic. BP Readings from Last 3 Encounters:   03/06/20 (!) 144/76   01/08/20 112/62   12/26/19 124/76     Patient denies chest pain, diaphoresis, dyspnea, dyspnea on exertion, peripheral edema, palpitations, headache, vision changes. Regarding diabetes, this is a chronic problem under  excellent control. Known diabetic complications include none. Currently diet controlled   Patient complains of no polyuria or polydipsia, no chest pain, dyspnea or TIA's, no numbness, tingling or pain in extremities, no unusual visual symptoms.   Lab Results   Component Value Date    LABA1C 6.3 03/06/2020    LABA1C 6.5 10/25/2019    LABA1C 6.4 07/24/2019     Microalbumin:   Lab Results   Component Value Date    LABMICR <12.0 03/13/2018     Just picked up repatha from the pharmacy. Has not yet started. Starting to have allergy symptoms    Wt Readings from Last 3 Encounters:   03/06/20 203 lb (92.1 kg)   01/08/20 206 lb (93.4 kg)   12/26/19 213 lb (96.6 kg)       Allergies   Allergen Reactions    Penicillins Rash       Medications  Current Outpatient Medications   Medication Sig Dispense Refill    REPATHA SURECLICK 561 MG/ML SOAJ 896 mg      ondansetron (ZOFRAN) 4 MG tablet Take 1 tablet by mouth 3 times daily as needed for Nausea or Vomiting 15 tablet 0    ipratropium (ATROVENT HFA) 17 MCG/ACT inhaler Inhale 2 puffs into the lungs 3 times daily as needed for Wheezing 1 Inhaler 1    promethazine-dextromethorphan (PROMETHAZINE-DM) 6.25-15 MG/5ML syrup Take 5 mLs by mouth every 6 hours as needed for Cough (or drainage) 120 mL 0    Handicap Placard MISC by Does not apply route Patient cannot walk 200 ft without stopping to rest.    Expiration 1/2023 1 each 0    ONETOUCH DELICA LANCETS 29N MISC TEST EVERY DAY AS DIRECTED 100 each 0    chlorthalidone (HYGROTON) 25 MG tablet Take 1 tablet by mouth daily 30 tablet 5    levothyroxine (SYNTHROID) 75 MCG tablet Take 1 tablet by mouth Daily 30 tablet 5    polyethylene glycol (GLYCOLAX) powder MIX 1 CAPFUL(17GM) IN A BEVERAGE AND DRINK EVERY  g 11    tiotropium (SPIRIVA RESPIMAT) 1.25 MCG/ACT AERS inhaler Inhale 2 puffs into the lungs daily 1 Inhaler 5    ibuprofen (ADVIL;MOTRIN) 800 MG tablet Take 1 tablet by mouth every 8 hours as needed for Pain 30 tablet 5    blood glucose monitor strips Test 1 times a day & as needed for symptoms of irregular blood glucose. 100 strip 3    triamcinolone (KENALOG) 0.1 % cream Apply topically 2 times daily to lower extremities up to 2 weeks.  1 Tube 1    ammonium lactate (AMLACTIN) 12 % lotion APPLY ONCE DAILY 225 g 5    Cholecalciferol (VITAMIN D3) 2000 units CAPS TK 1 C PO QD  5    levocetirizine (XYZAL) 5 MG tablet TK 1 T PO QHS  0    ketotifen (ZADITOR) 0.025 % ophthalmic

## 2020-04-22 RX ORDER — ISOPROPYL ALCOHOL 0.75 G/1
SWAB TOPICAL
Qty: 100 EACH | Refills: 11 | Status: SHIPPED
Start: 2020-04-22 | End: 2020-05-29 | Stop reason: SDUPTHER

## 2020-05-29 RX ORDER — ISOPROPYL ALCOHOL 0.75 G/1
SWAB TOPICAL
Qty: 100 EACH | Refills: 11 | Status: SHIPPED
Start: 2020-05-29 | End: 2021-04-30 | Stop reason: SDUPTHER

## 2020-05-29 RX ORDER — GLUCOSAMINE HCL/CHONDROITIN SU 500-400 MG
CAPSULE ORAL
Qty: 100 STRIP | Refills: 5 | Status: SHIPPED
Start: 2020-05-29 | End: 2021-04-30 | Stop reason: SDUPTHER

## 2020-05-29 RX ORDER — LANCETS 33 GAUGE
EACH MISCELLANEOUS
Qty: 100 EACH | Refills: 5 | Status: SHIPPED
Start: 2020-05-29 | End: 2021-04-30 | Stop reason: SDUPTHER

## 2020-06-03 ENCOUNTER — TELEPHONE (OUTPATIENT)
Dept: FAMILY MEDICINE CLINIC | Age: 65
End: 2020-06-03

## 2020-06-19 ENCOUNTER — HOSPITAL ENCOUNTER (OUTPATIENT)
Age: 65
Discharge: HOME | End: 2020-06-19
Payer: MEDICARE

## 2020-06-19 ENCOUNTER — OFFICE VISIT (OUTPATIENT)
Dept: FAMILY MEDICINE CLINIC | Age: 65
End: 2020-06-19
Payer: MEDICARE

## 2020-06-19 VITALS
DIASTOLIC BLOOD PRESSURE: 71 MMHG | HEIGHT: 67 IN | BODY MASS INDEX: 33.43 KG/M2 | RESPIRATION RATE: 16 BRPM | WEIGHT: 213 LBS | OXYGEN SATURATION: 98 % | SYSTOLIC BLOOD PRESSURE: 135 MMHG | HEART RATE: 64 BPM | TEMPERATURE: 97.8 F

## 2020-06-19 LAB
ALBUMIN SERPL-MCNC: 4.6 G/DL (ref 3.5–5.2)
ALP BLD-CCNC: 122 U/L (ref 35–104)
ALT SERPL-CCNC: 12 U/L (ref 0–32)
ANION GAP SERPL CALCULATED.3IONS-SCNC: 8 MMOL/L (ref 7–16)
AST SERPL-CCNC: 19 U/L (ref 0–31)
BILIRUB SERPL-MCNC: 0.4 MG/DL (ref 0–1.2)
BUN BLDV-MCNC: 19 MG/DL (ref 8–23)
CALCIUM SERPL-MCNC: 9.9 MG/DL (ref 8.6–10.2)
CHLORIDE BLD-SCNC: 103 MMOL/L (ref 98–107)
CHOLESTEROL, FASTING: 238 MG/DL (ref 0–199)
CO2: 28 MMOL/L (ref 22–29)
CREAT SERPL-MCNC: 0.7 MG/DL (ref 0.5–1)
CREATININE URINE POCT: NORMAL
CREATININE URINE: 93 MG/DL (ref 29–226)
GFR AFRICAN AMERICAN: >60
GFR NON-AFRICAN AMERICAN: >60 ML/MIN/1.73
GLUCOSE BLD-MCNC: 101 MG/DL (ref 74–99)
HBA1C MFR BLD: 6.5 % (ref 4–5.6)
HBA1C MFR BLD: 6.7 %
HDLC SERPL-MCNC: 77 MG/DL
LDL CHOLESTEROL CALCULATED: 140 MG/DL (ref 0–99)
MICROALBUMIN UR-MCNC: 14.2 MG/L
MICROALBUMIN/CREAT 24H UR: NORMAL MG/G{CREAT}
MICROALBUMIN/CREAT UR-RTO: 15.3 (ref 0–30)
MICROALBUMIN/CREAT UR-RTO: NORMAL
POTASSIUM SERPL-SCNC: 3.5 MMOL/L (ref 3.5–5)
SODIUM BLD-SCNC: 139 MMOL/L (ref 132–146)
T3 FREE: 3.3 PG/ML (ref 2–4.4)
T4 FREE: 0.83 NG/DL (ref 0.93–1.7)
TOTAL PROTEIN: 7.6 G/DL (ref 6.4–8.3)
TRIGLYCERIDE, FASTING: 104 MG/DL (ref 0–149)
TSH SERPL DL<=0.05 MIU/L-ACNC: 10.45 UIU/ML (ref 0.27–4.2)
VLDLC SERPL CALC-MCNC: 21 MG/DL

## 2020-06-19 PROCEDURE — 84439 ASSAY OF FREE THYROXINE: CPT

## 2020-06-19 PROCEDURE — 83036 HEMOGLOBIN GLYCOSYLATED A1C: CPT | Performed by: FAMILY MEDICINE

## 2020-06-19 PROCEDURE — 82044 UR ALBUMIN SEMIQUANTITATIVE: CPT

## 2020-06-19 PROCEDURE — 84481 FREE ASSAY (FT-3): CPT

## 2020-06-19 PROCEDURE — 82044 UR ALBUMIN SEMIQUANTITATIVE: CPT | Performed by: FAMILY MEDICINE

## 2020-06-19 PROCEDURE — 80053 COMPREHEN METABOLIC PANEL: CPT

## 2020-06-19 PROCEDURE — 83036 HEMOGLOBIN GLYCOSYLATED A1C: CPT

## 2020-06-19 PROCEDURE — 99213 OFFICE O/P EST LOW 20 MIN: CPT | Performed by: FAMILY MEDICINE

## 2020-06-19 PROCEDURE — 84443 ASSAY THYROID STIM HORMONE: CPT

## 2020-06-19 PROCEDURE — 80061 LIPID PANEL: CPT

## 2020-06-19 PROCEDURE — 36415 COLL VENOUS BLD VENIPUNCTURE: CPT

## 2020-06-19 PROCEDURE — 83721 ASSAY OF BLOOD LIPOPROTEIN: CPT

## 2020-06-19 PROCEDURE — 82570 ASSAY OF URINE CREATININE: CPT

## 2020-06-19 RX ORDER — POLYETHYLENE GLYCOL 3350 17 G/17G
POWDER, FOR SOLUTION ORAL
Qty: 510 G | Refills: 11 | Status: SHIPPED
Start: 2020-06-19 | End: 2021-04-30 | Stop reason: SDUPTHER

## 2020-06-19 RX ORDER — TRIAMCINOLONE ACETONIDE 1 MG/G
CREAM TOPICAL
Qty: 1 TUBE | Refills: 1 | Status: SHIPPED
Start: 2020-06-19 | End: 2021-07-30

## 2020-06-19 RX ORDER — IBUPROFEN 800 MG/1
800 TABLET ORAL EVERY 8 HOURS PRN
Qty: 30 TABLET | Refills: 1 | Status: SHIPPED
Start: 2020-06-19 | End: 2022-01-11 | Stop reason: SDUPTHER

## 2020-06-19 ASSESSMENT — ENCOUNTER SYMPTOMS
COUGH: 0
WHEEZING: 0

## 2020-06-19 NOTE — PROGRESS NOTES
lotion APPLY ONCE DAILY 225 g 5     No current facility-administered medications for this visit. Patient'spast medical, surgical, social and/or family history reviewed, updated in chart, and are non-contributory (unless otherwise stated). Review of Systems  Review of Systems   Constitutional: Negative for chills and fever. Respiratory: Negative for cough and wheezing. Cardiovascular: Negative for chest pain and leg swelling. Neurological: Negative for dizziness and headaches. As per hpi  PE:  VS:  /71   Pulse 64   Temp 97.8 °F (36.6 °C) (Temporal)   Resp 16   Ht 5' 7\" (1.702 m)   Wt 213 lb (96.6 kg)   SpO2 98%   Breastfeeding No   BMI 33.36 kg/m²   Physical Exam  Constitutional:       Appearance: She is well-developed. HENT:      Head: Normocephalic and atraumatic. Cardiovascular:      Rate and Rhythm: Normal rate and regular rhythm. Heart sounds: No murmur. No friction rub. No gallop. Pulmonary:      Effort: Pulmonary effort is normal.      Breath sounds: Normal breath sounds. No wheezing or rales. Skin:     General: Skin is warm and dry. Neurological:      Mental Status: She is alert and oriented to person, place, and time. Assessment/Plan:  Torey Choudhary was seen today for diabetes, hypertension, hyperlipidemia, hypothyroidism and hip pain. Diagnoses and all orders for this visit:    Controlled type 2 diabetes mellitus without complication, without long-term current use of insulin ([facility])  Cont diet control  -     POCT Microalbumin  -     POCT glycosylated hemoglobin (Hb A1C)    Hypothyroidism, unspecified type  Getting repeat labs for Dr Marga Sue    Mixed hyperlipidemia  On repatha - repeat labs    Essential hypertension  At goal    Primary osteoarthritis of both knees  -     ibuprofen (ADVIL;MOTRIN) 800 MG tablet;  Take 1 tablet by mouth every 8 hours as needed for Pain    Medication refill  -     polyethylene glycol (GLYCOLAX) 17 GM/SCOOP powder; MIX 1 CAPFUL(17GM) IN A BEVERAGE AND DRINK EVERY DAY    Xerosis of skin  Better treatment for thyroid  -     triamcinolone (KENALOG) 0.1 % cream; Apply topically 2 times daily to lower extremities up to 2 weeks. Return in about 3 months (around 9/19/2020) for medicare wellness. Advised patient to call with any new medication issues. All questions answered.   Call or go to emergency department ifsymptoms worsen or persist.

## 2020-06-23 LAB
Lab: NORMAL
REPORT: NORMAL
THIS TEST SENT TO: NORMAL

## 2020-09-08 ENCOUNTER — OFFICE VISIT (OUTPATIENT)
Dept: FAMILY MEDICINE CLINIC | Age: 65
End: 2020-09-08
Payer: MEDICARE

## 2020-09-08 VITALS
HEART RATE: 67 BPM | HEIGHT: 67 IN | SYSTOLIC BLOOD PRESSURE: 135 MMHG | RESPIRATION RATE: 18 BRPM | BODY MASS INDEX: 33.43 KG/M2 | WEIGHT: 213 LBS | DIASTOLIC BLOOD PRESSURE: 71 MMHG | OXYGEN SATURATION: 98 % | TEMPERATURE: 97.3 F

## 2020-09-08 PROCEDURE — G0439 PPPS, SUBSEQ VISIT: HCPCS | Performed by: FAMILY MEDICINE

## 2020-09-08 PROCEDURE — 99212 OFFICE O/P EST SF 10 MIN: CPT | Performed by: FAMILY MEDICINE

## 2020-09-08 ASSESSMENT — LIFESTYLE VARIABLES: HOW OFTEN DO YOU HAVE A DRINK CONTAINING ALCOHOL: 0

## 2020-09-08 ASSESSMENT — PATIENT HEALTH QUESTIONNAIRE - PHQ9
SUM OF ALL RESPONSES TO PHQ QUESTIONS 1-9: 0
SUM OF ALL RESPONSES TO PHQ QUESTIONS 1-9: 0

## 2020-09-08 NOTE — PATIENT INSTRUCTIONS
Personalized Preventive Plan for Divina Amor - 9/8/2020  Medicare offers a range of preventive health benefits. Some of the tests and screenings are paid in full while other may be subject to a deductible, co-insurance, and/or copay. Some of these benefits include a comprehensive review of your medical history including lifestyle, illnesses that may run in your family, and various assessments and screenings as appropriate. After reviewing your medical record and screening and assessments performed today your provider may have ordered immunizations, labs, imaging, and/or referrals for you. A list of these orders (if applicable) as well as your Preventive Care list are included within your After Visit Summary for your review. Other Preventive Recommendations:    · A preventive eye exam performed by an eye specialist is recommended every 1-2 years to screen for glaucoma; cataracts, macular degeneration, and other eye disorders. · A preventive dental visit is recommended every 6 months. · Try to get at least 150 minutes of exercise per week or 10,000 steps per day on a pedometer . · Order or download the FREE \"Exercise & Physical Activity: Your Everyday Guide\" from [employer]. Call 5-438.748.3525 or search [employer] online. · You need 5431-6764 mg of calcium and 2245-2256 IU of vitamin D per day. It is possible to meet your calcium requirement with diet alone, but a vitamin D supplement is usually necessary to meet this goal.  · When exposed to the sun, use a sunscreen that protects against both UVA and UVB radiation with an SPF of 30 or greater. Reapply every 2 to 3 hours or after sweating, drying off with a towel, or swimming. · Always wear a seat belt when traveling in a car. Always wear a helmet when riding a bicycle or motorcycle.

## 2020-09-09 NOTE — PROGRESS NOTES
9/9/2020    Domonique Laura is a 75 y.o. female here for   Chief Complaint   Patient presents with         In addition to medicare wellness pt also c/o worsenng hip pain  This has been gradually worsening and is now a 10/10 in severity  Location in rght hip, radiation down right thigh. Aching in nature. Constant though aggravated by activity. Prev imaging reviewed. She had XR done of lumbar spine recently but not hip. Wt Readings from Last 3 Encounters:   09/08/20 213 lb (96.6 kg)   06/19/20 213 lb (96.6 kg)   03/06/20 203 lb (92.1 kg)       She  reports that she has never smoked. She has never used smokeless tobacco.    Allergies   Allergen Reactions    Penicillins Rash       Medications  Current Outpatient Medications   Medication Sig Dispense Refill    ibuprofen (ADVIL;MOTRIN) 800 MG tablet Take 1 tablet by mouth every 8 hours as needed for Pain 30 tablet 1    polyethylene glycol (GLYCOLAX) 17 GM/SCOOP powder MIX 1 CAPFUL(17GM) IN A BEVERAGE AND DRINK EVERY  g 11    triamcinolone (KENALOG) 0.1 % cream Apply topically 2 times daily to lower extremities up to 2 weeks.  1 Tube 1    Alcohol Swabs (B-D SINGLE USE SWABS REGULAR) PADS USE AS DIRECTED 100 each 11    blood glucose monitor strips Test 1 times a day & as needed for symptoms of irregular blood glucose. (pt has one touch verio Flex at home) 100 strip 5    OneTouch Delica Lancets 99L MISC TEST EVERY DAY AS DIRECTED 100 each 5    REPATHA SURECLICK 092 MG/ML SOAJ 486 mg      chlorthalidone (HYGROTON) 25 MG tablet Take 1 tablet by mouth daily 30 tablet 5    ipratropium (ATROVENT HFA) 17 MCG/ACT inhaler Inhale 2 puffs into the lungs 3 times daily as needed for Wheezing 1 Inhaler 1    levothyroxine (SYNTHROID) 75 MCG tablet Take 1 tablet by mouth Daily 30 tablet 5    tiotropium (SPIRIVA RESPIMAT) 1.25 MCG/ACT AERS inhaler Inhale 2 puffs into the lungs daily 1 Inhaler 5    ondansetron (ZOFRAN) 4 MG tablet Take 1 tablet by mouth 3 times daily as needed for Nausea or Vomiting 15 tablet 0    Handicap Placard MISC by Does not apply route Patient cannot walk 200 ft without stopping to rest.    Expiration 1/2023 1 each 0    ammonium lactate (AMLACTIN) 12 % lotion APPLY ONCE DAILY 225 g 5    Cholecalciferol (VITAMIN D3) 2000 units CAPS TK 1 C PO QD  5    levocetirizine (XYZAL) 5 MG tablet TK 1 T PO QHS  0    ketotifen (ZADITOR) 0.025 % ophthalmic solution INT 1 GTT IN OU BID PRF ALLERGIES  5    aliskiren (TEKTURNA) 300 MG tablet Take 300 mg by mouth daily. No current facility-administered medications for this visit. ROS  Otherwise negative    PE:  VS:  /71   Pulse 67   Temp 97.3 °F (36.3 °C)   Resp 18   Ht 5' 7\" (1.702 m)   Wt 213 lb (96.6 kg)   SpO2 98%   BMI 33.36 kg/m²   General Appearance: alert and oriented to person, place and time, well-developed and well nourished and in no acute distress  Pulmonary/Chest: clear to auscultation bilaterally- no wheezes, rales or rhonchi, normal air movement, no respiratory distress  Cardiovascular: normal rate, normal S1 and S2, no murmurs and no gallops  Psych - Normal affect and judgement  Rght hip with limited flexion and abduction, external rotation severely limited. Some pain over trochanteric bursa but pain not limited to trochanteric bursa    Assessment/Plan:  Cate Ayon was seen today for medicare awv. Diagnoses and all orders for this visit:    Hip arthritis  -     External Referral To Orthopedic Surgery  -     XR HIP 2-3 VW W PELVIS RIGHT; Future    Hip arthritis  Reviewed conservative options - she did not improve with PT  Repeat XR  Refer to orthopedics  -     External Referral To Orthopedic Surgery  -     XR HIP 2-3 VW W PELVIS RIGHT; Future     All questions answered.

## 2020-09-09 NOTE — PROGRESS NOTES
Medicare Annual Wellness Visit  Name: Malcolm Nunez Date: 2020   MRN: [id number] Sex: Female   Age: 75 y.o. Ethnicity: Non-/Non    : 1955 Race: Torrey Farfan is here for Medicare AWV    Screenings for behavioral, psychosocial and functional/safety risks, and cognitive dysfunction are all negative except as indicated below. These results, as well as other patient data from the [address] form, are documented in Flowsheets linked to this Encounter. Allergies   Allergen Reactions    Penicillins Rash       Prior to Visit Medications    Medication Sig Taking? Authorizing Provider   ibuprofen (ADVIL;MOTRIN) 800 MG tablet Take 1 tablet by mouth every 8 hours as needed for Pain Yes Jason Moreno MD   polyethylene glycol (GLYCOLAX) 17 GM/SCOOP powder MIX 1 CAPFUL(17GM) IN A BEVERAGE AND DRINK EVERY DAY Yes Jason Moreno MD   triamcinolone (KENALOG) 0.1 % cream Apply topically 2 times daily to lower extremities up to 2 weeks.  Yes Jason Moreno MD   Alcohol Swabs (B-D SINGLE USE SWABS REGULAR) PADS USE AS DIRECTED Yes Jason Moreno MD   blood glucose monitor strips Test 1 times a day & as needed for symptoms of irregular blood glucose. (pt has one touch verio Flex at home) Yes Jason Moreno MD   OneTouch Delica Lancets 84D MISC TEST EVERY DAY AS DIRECTED Yes Jason Moreno MD   REPATHA SURECLICK 616 MG/ML SOAJ 223 mg Yes Historical Provider, MD   chlorthalidone (HYGROTON) 25 MG tablet Take 1 tablet by mouth daily Yes Jason Moreno MD   ipratropium (ATROVENT HFA) 17 MCG/ACT inhaler Inhale 2 puffs into the lungs 3 times daily as needed for Wheezing Yes Jason Moreno MD   levothyroxine (SYNTHROID) 75 MCG tablet Take 1 tablet by mouth Daily Yes Jason Moreno MD   tiotropium (SPIRIVA RESPIMAT) 1.25 MCG/ACT AERS inhaler Inhale 2 puffs into the lungs daily Yes Jason Moreno MD   ondansetron (ZOFRAN) 4 MG tablet Take 1 tablet by mouth 3 times daily as needed for Nausea or Vomiting Yes Erlin Barrett MD   Handicap Placard MISC by Does not apply route Patient cannot walk 200 ft without stopping to rest.    Expiration 1/2023 Yes Erlin Barrett MD   ammonium lactate (AMLACTIN) 12 % lotion APPLY ONCE DAILY Yes Erlin Barrett MD   Cholecalciferol (VITAMIN D3) 2000 units CAPS TK 1 C PO QD Yes Historical Provider, MD   levocetirizine (XYZAL) 5 MG tablet TK 1 T PO QHS Yes Historical Provider, MD   ketotifen (ZADITOR) 0.025 % ophthalmic solution INT 1 GTT IN OU BID PRF ALLERGIES Yes Historical Provider, MD   aliskiren (TEKTURNA) 300 MG tablet Take 300 mg by mouth daily. Yes Historical Provider, MD       Past Medical History:   Diagnosis Date    Bronchitis     Hyperlipidemia     Hypertension     Thyroid disease        Past Surgical History:   Procedure Laterality Date    COLONOSCOPY  1/21/2015    UPPER GASTROINTESTINAL ENDOSCOPY  1/21/2015       No family history on file. CareTeam (Including outside providers/suppliers regularly involved in providing care):   Patient Care Team:  Erlin Barrett MD as PCP - Stacia Martinez MD as PCP - [facility] Empaneled Provider  Joesph Dodd MD as Consulting Physician (Nephrology)    Wt Readings from Last 3 Encounters:   09/08/20 213 lb (96.6 kg)   06/19/20 213 lb (96.6 kg)   03/06/20 203 lb (92.1 kg)     Vitals:    09/08/20 1515   BP: 135/71   Pulse: 67   Resp: 18   Temp: 97.3 °F (36.3 °C)   SpO2: 98%   Weight: 213 lb (96.6 kg)   Height: 5' 7\" (1.702 m)     Body mass index is 33.36 kg/m². Based upon direct observation of the patient, evaluation of cognition reveals recent and remote memory intact. Patient's complete Health Risk Assessment and screening values have been reviewed and are found in Flowsheets. The following problems were reviewed today and where indicated follow up appointments were made and/or referrals ordered.     Positive Risk Factor Screenings with Interventions:     General Health:  General  In general, how would you say your health is?: Good  In the past 7 days, have you experienced any of the following? New or Increased Pain, New or Increased Fatigue, Loneliness, Social Isolation, Stress or Anger?: (!) New or Increased Pain  Do you get the social and emotional support that you need?: Yes  Do you have a Living Will?: Yes  General Health Risk Interventions:  · Pain issues: see separate note regarding hip pain and arthritis management    Health Habits/Nutrition:  Health Habits/Nutrition  Do you exercise for at least 20 minutes 2-3 times per week?: Yes  Have you lost any weight without trying in the past 3 months?: No  Do you eat fewer than 2 meals per day?: No  Have you seen a dentist within the past year?: Yes  Body mass index is 33.36 kg/m².   Health Habits/Nutrition Interventions:  · none indicated   · Heart healthy diet due to hyperlipidemia - intolerate to repatha - advised her to discuss with endocrinologist    Personalized Preventive Plan   Current Health Maintenance Status  Immunization History   Administered Date(s) Administered    Influenza Vaccine, unspecified formulation 11/17/2008    Pneumococcal Polysaccharide ([id number]) 12/17/2008, 11/20/2014, 10/29/2015    Tdap (Boostrix, Adacel) 08/10/2008, 10/05/2012, 08/15/2017        Health Maintenance   Topic Date Due    Shingles Vaccine (1 of 2) 12/19/2003    Annual Wellness Visit (AWV)  05/29/2019    Flu vaccine (1) 09/01/2020    Diabetic foot exam  09/24/2020    Pneumococcal 65+ years Vaccine (1 of 1 - PPSV23) 10/29/2020    Breast cancer screen  11/13/2020    A1C test (Diabetic or Prediabetic)  06/19/2021    Diabetic microalbuminuria test  06/19/2021    Lipid screen  06/19/2021    TSH testing  06/19/2021    Potassium monitoring  06/19/2021    Creatinine monitoring  06/19/2021    Diabetic retinal exam  08/04/2021    Colon cancer screen colonoscopy  12/01/2025    DTaP/Tdap/Td vaccine (4 - Td) 08/15/2027    DEXA (modify frequency per FRAX score)  Completed    Hepatitis A vaccine  Aged Out    Hib vaccine  Aged Out    Meningococcal (ACWY) vaccine  Aged Out    Hepatitis C screen  Discontinued     Recommendations for Preventive Services Due: see orders and patient instructions/AVS.  . Recommended screening schedule for the next 5-10 years is provided to the patient in written form: see Patient Sergio Amin was seen today for medicare awv.     Diagnoses and all orders for this visit:    Routine general medical examination at a health care facility

## 2020-10-16 ENCOUNTER — HOSPITAL ENCOUNTER (OUTPATIENT)
Age: 65
Discharge: HOME | End: 2020-10-18
Payer: MEDICARE

## 2020-10-16 ENCOUNTER — HOSPITAL ENCOUNTER (OUTPATIENT)
Dept: GENERAL RADIOLOGY | Age: 65
Discharge: HOME | End: 2020-10-18
Payer: MEDICARE

## 2020-10-16 PROCEDURE — 73502 X-RAY EXAM HIP UNI 2-3 VIEWS: CPT

## 2021-03-03 ENCOUNTER — HOSPITAL ENCOUNTER (OUTPATIENT)
Age: 66
Discharge: HOME | End: 2021-03-03
Payer: MEDICARE

## 2021-03-03 LAB
ALBUMIN SERPL-MCNC: 4.2 G/DL (ref 3.5–5.2)
ALP BLD-CCNC: 118 U/L (ref 35–104)
ALT SERPL-CCNC: 16 U/L (ref 0–32)
ANION GAP SERPL CALCULATED.3IONS-SCNC: 9 MMOL/L (ref 7–16)
AST SERPL-CCNC: 20 U/L (ref 0–31)
BASOPHILS ABSOLUTE: 0.01 E9/L (ref 0–0.2)
BASOPHILS RELATIVE PERCENT: 0.2 % (ref 0–2)
BILIRUB SERPL-MCNC: 0.5 MG/DL (ref 0–1.2)
BUN BLDV-MCNC: 14 MG/DL (ref 8–23)
CALCIUM SERPL-MCNC: 9.9 MG/DL (ref 8.6–10.2)
CHLORIDE BLD-SCNC: 103 MMOL/L (ref 98–107)
CHOLESTEROL, FASTING: 229 MG/DL (ref 0–199)
CO2: 30 MMOL/L (ref 22–29)
CREAT SERPL-MCNC: 0.7 MG/DL (ref 0.5–1)
EOSINOPHILS ABSOLUTE: 0.13 E9/L (ref 0.05–0.5)
EOSINOPHILS RELATIVE PERCENT: 2.6 % (ref 0–6)
GFR AFRICAN AMERICAN: >60
GFR NON-AFRICAN AMERICAN: >60 ML/MIN/1.73
GLUCOSE BLD-MCNC: 101 MG/DL (ref 74–99)
HCT VFR BLD CALC: 36.3 % (ref 34–48)
HDLC SERPL-MCNC: 78 MG/DL
HEMOGLOBIN: 12.2 G/DL (ref 11.5–15.5)
IMMATURE GRANULOCYTES #: 0.01 E9/L
IMMATURE GRANULOCYTES %: 0.2 % (ref 0–5)
LDL CHOLESTEROL CALCULATED: 136 MG/DL (ref 0–99)
LYMPHOCYTES ABSOLUTE: 1.76 E9/L (ref 1.5–4)
LYMPHOCYTES RELATIVE PERCENT: 34.6 % (ref 20–42)
MCH RBC QN AUTO: 33.3 PG (ref 26–35)
MCHC RBC AUTO-ENTMCNC: 33.6 % (ref 32–34.5)
MCV RBC AUTO: 99.2 FL (ref 80–99.9)
MONOCYTES ABSOLUTE: 0.42 E9/L (ref 0.1–0.95)
MONOCYTES RELATIVE PERCENT: 8.3 % (ref 2–12)
NEUTROPHILS ABSOLUTE: 2.76 E9/L (ref 1.8–7.3)
NEUTROPHILS RELATIVE PERCENT: 54.1 % (ref 43–80)
PDW BLD-RTO: 13.5 FL (ref 11.5–15)
PHOSPHORUS: 3.1 MG/DL (ref 2.5–4.5)
PLATELET # BLD: 346 E9/L (ref 130–450)
PMV BLD AUTO: 9.1 FL (ref 7–12)
POTASSIUM SERPL-SCNC: 4 MMOL/L (ref 3.5–5)
RBC # BLD: 3.66 E12/L (ref 3.5–5.5)
SODIUM BLD-SCNC: 142 MMOL/L (ref 132–146)
TOTAL PROTEIN: 7 G/DL (ref 6.4–8.3)
TRIGLYCERIDE, FASTING: 74 MG/DL (ref 0–149)
VLDLC SERPL CALC-MCNC: 15 MG/DL
WBC # BLD: 5.1 E9/L (ref 4.5–11.5)

## 2021-03-03 PROCEDURE — 36415 COLL VENOUS BLD VENIPUNCTURE: CPT

## 2021-03-03 PROCEDURE — 80061 LIPID PANEL: CPT

## 2021-03-03 PROCEDURE — 80053 COMPREHEN METABOLIC PANEL: CPT

## 2021-03-03 PROCEDURE — 85025 COMPLETE CBC W/AUTO DIFF WBC: CPT

## 2021-03-03 PROCEDURE — 84100 ASSAY OF PHOSPHORUS: CPT

## 2021-04-01 DIAGNOSIS — I10 ESSENTIAL HYPERTENSION: Chronic | ICD-10-CM

## 2021-04-02 RX ORDER — CHLORTHALIDONE 25 MG/1
25 TABLET ORAL DAILY
Qty: 30 TABLET | Refills: 2 | Status: SHIPPED
Start: 2021-04-02 | End: 2021-04-07 | Stop reason: SDUPTHER

## 2021-04-07 RX ORDER — CHLORTHALIDONE 25 MG/1
25 TABLET ORAL DAILY
Qty: 30 TABLET | Refills: 2 | Status: SHIPPED
Start: 2021-04-07 | End: 2021-07-06

## 2021-04-15 ENCOUNTER — HOSPITAL ENCOUNTER (EMERGENCY)
Age: 66
Discharge: HOME | End: 2021-04-15
Attending: EMERGENCY MEDICINE
Payer: MEDICARE

## 2021-04-15 VITALS
HEART RATE: 83 BPM | OXYGEN SATURATION: 96 % | TEMPERATURE: 97.6 F | BODY MASS INDEX: 31.39 KG/M2 | SYSTOLIC BLOOD PRESSURE: 156 MMHG | DIASTOLIC BLOOD PRESSURE: 94 MMHG | HEIGHT: 67 IN | WEIGHT: 200 LBS | RESPIRATION RATE: 14 BRPM

## 2021-04-15 DIAGNOSIS — R00.2 PALPITATIONS: Primary | ICD-10-CM

## 2021-04-15 LAB
ALBUMIN SERPL-MCNC: 4.4 G/DL (ref 3.5–5.2)
ALP BLD-CCNC: 127 U/L (ref 35–104)
ALT SERPL-CCNC: 17 U/L (ref 0–32)
ANION GAP SERPL CALCULATED.3IONS-SCNC: 11 MMOL/L (ref 7–16)
AST SERPL-CCNC: 29 U/L (ref 0–31)
BASOPHILS ABSOLUTE: 0.03 E9/L (ref 0–0.2)
BASOPHILS RELATIVE PERCENT: 0.5 % (ref 0–2)
BILIRUB SERPL-MCNC: 0.4 MG/DL (ref 0–1.2)
BUN BLDV-MCNC: 14 MG/DL (ref 8–23)
CALCIUM SERPL-MCNC: 10.1 MG/DL (ref 8.6–10.2)
CHLORIDE BLD-SCNC: 101 MMOL/L (ref 98–107)
CO2: 29 MMOL/L (ref 22–29)
CREAT SERPL-MCNC: 0.7 MG/DL (ref 0.5–1)
EOSINOPHILS ABSOLUTE: 0.13 E9/L (ref 0.05–0.5)
EOSINOPHILS RELATIVE PERCENT: 2.4 % (ref 0–6)
GFR AFRICAN AMERICAN: >60
GFR NON-AFRICAN AMERICAN: >60 ML/MIN/1.73
GLUCOSE BLD-MCNC: 123 MG/DL (ref 74–99)
HCT VFR BLD CALC: 36.7 % (ref 34–48)
HEMOGLOBIN: 12.5 G/DL (ref 11.5–15.5)
IMMATURE GRANULOCYTES #: 0.01 E9/L
IMMATURE GRANULOCYTES %: 0.2 % (ref 0–5)
LYMPHOCYTES ABSOLUTE: 1.99 E9/L (ref 1.5–4)
LYMPHOCYTES RELATIVE PERCENT: 36.2 % (ref 20–42)
MAGNESIUM: 1.8 MG/DL (ref 1.6–2.6)
MCH RBC QN AUTO: 32.4 PG (ref 26–35)
MCHC RBC AUTO-ENTMCNC: 34.1 % (ref 32–34.5)
MCV RBC AUTO: 95.1 FL (ref 80–99.9)
MONOCYTES ABSOLUTE: 0.42 E9/L (ref 0.1–0.95)
MONOCYTES RELATIVE PERCENT: 7.6 % (ref 2–12)
NEUTROPHILS ABSOLUTE: 2.92 E9/L (ref 1.8–7.3)
NEUTROPHILS RELATIVE PERCENT: 53.1 % (ref 43–80)
PDW BLD-RTO: 13.6 FL (ref 11.5–15)
PLATELET # BLD: 338 E9/L (ref 130–450)
PMV BLD AUTO: 9.8 FL (ref 7–12)
POTASSIUM SERPL-SCNC: 3.4 MMOL/L (ref 3.5–5)
RBC # BLD: 3.86 E12/L (ref 3.5–5.5)
SODIUM BLD-SCNC: 141 MMOL/L (ref 132–146)
TOTAL PROTEIN: 7.3 G/DL (ref 6.4–8.3)
TROPONIN: <0.01 NG/ML (ref 0–0.03)
WBC # BLD: 5.5 E9/L (ref 4.5–11.5)

## 2021-04-15 PROCEDURE — 36415 COLL VENOUS BLD VENIPUNCTURE: CPT

## 2021-04-15 PROCEDURE — 84484 ASSAY OF TROPONIN QUANT: CPT

## 2021-04-15 PROCEDURE — 80053 COMPREHEN METABOLIC PANEL: CPT

## 2021-04-15 PROCEDURE — 99284 EMERGENCY DEPT VISIT MOD MDM: CPT

## 2021-04-15 PROCEDURE — 93005 ELECTROCARDIOGRAM TRACING: CPT | Performed by: EMERGENCY MEDICINE

## 2021-04-15 PROCEDURE — 85025 COMPLETE CBC W/AUTO DIFF WBC: CPT

## 2021-04-15 PROCEDURE — 6370000000 HC RX 637 (ALT 250 FOR IP): Performed by: EMERGENCY MEDICINE

## 2021-04-15 PROCEDURE — 83735 ASSAY OF MAGNESIUM: CPT

## 2021-04-15 RX ORDER — POTASSIUM CHLORIDE 20 MEQ/1
20 TABLET, EXTENDED RELEASE ORAL ONCE
Status: COMPLETED | OUTPATIENT
Start: 2021-04-15 | End: 2021-04-15

## 2021-04-15 RX ORDER — METOPROLOL SUCCINATE 25 MG/1
25 TABLET, EXTENDED RELEASE ORAL DAILY
Qty: 15 TABLET | Refills: 0 | Status: SHIPPED | OUTPATIENT
Start: 2021-04-15 | End: 2021-04-30 | Stop reason: SDUPTHER

## 2021-04-15 RX ADMIN — POTASSIUM CHLORIDE 20 MEQ: 1500 TABLET, EXTENDED RELEASE ORAL at 21:19

## 2021-04-16 LAB
EKG ATRIAL RATE: 81 BPM
EKG P AXIS: 54 DEGREES
EKG P-R INTERVAL: 212 MS
EKG Q-T INTERVAL: 402 MS
EKG QRS DURATION: 90 MS
EKG QTC CALCULATION (BAZETT): 466 MS
EKG R AXIS: 11 DEGREES
EKG T AXIS: 12 DEGREES
EKG VENTRICULAR RATE: 81 BPM

## 2021-04-16 PROCEDURE — 93010 ELECTROCARDIOGRAM REPORT: CPT | Performed by: INTERNAL MEDICINE

## 2021-04-24 NOTE — ED PROVIDER NOTES
Bilirubin 0.4 0.0 - 1.2 mg/dL    Alkaline Phosphatase 127 (H) 35 - 104 U/L    ALT 17 0 - 32 U/L    AST 29 0 - 31 U/L   CBC Auto Differential   Result Value Ref Range    WBC 5.5 4.5 - 11.5 E9/L    RBC 3.86 3.50 - 5.50 E12/L    Hemoglobin 12.5 11.5 - 15.5 g/dL    Hematocrit 36.7 34.0 - 48.0 %    MCV 95.1 80.0 - 99.9 fL    MCH 32.4 26.0 - 35.0 pg    MCHC 34.1 32.0 - 34.5 %    RDW 13.6 11.5 - 15.0 fL    Platelets 190 723 - 055 E9/L    MPV 9.8 7.0 - 12.0 fL    Neutrophils % 53.1 43.0 - 80.0 %    Immature Granulocytes % 0.2 0.0 - 5.0 %    Lymphocytes % 36.2 20.0 - 42.0 %    Monocytes % 7.6 2.0 - 12.0 %    Eosinophils % 2.4 0.0 - 6.0 %    Basophils % 0.5 0.0 - 2.0 %    Neutrophils Absolute 2.92 1.80 - 7.30 E9/L    Immature Granulocytes # 0.01 E9/L    Lymphocytes Absolute 1.99 1.50 - 4.00 E9/L    Monocytes Absolute 0.42 0.10 - 0.95 E9/L    Eosinophils Absolute 0.13 0.05 - 0.50 E9/L    Basophils Absolute 0.03 0.00 - 0.20 E9/L   Troponin   Result Value Ref Range    Troponin <0.01 0.00 - 0.03 ng/mL   Magnesium   Result Value Ref Range    Magnesium 1.8 1.6 - 2.6 mg/dL   EKG 12 Lead   Result Value Ref Range    Ventricular Rate 81 BPM    Atrial Rate 81 BPM    P-R Interval 212 ms    QRS Duration 90 ms    Q-T Interval 402 ms    QTc Calculation (Bazett) 466 ms    P Axis 54 degrees    R Axis 11 degrees    T Axis 12 degrees       RADIOLOGY:  Interpreted by Radiologist.  No orders to display       ------------------------- NURSING NOTES AND VITALS REVIEWED ---------------------------   The nursing notes within the ED encounter and vital signs as below have been reviewed.    BP (!) 156/94   Pulse 83   Temp 97.6 °F (36.4 °C) (Temporal)   Resp 14   Ht 5' 7\" (1.702 m)   Wt 200 lb (90.7 kg)   SpO2 96%   BMI 31.32 kg/m²   Oxygen Saturation Interpretation: Normal      ---------------------------------------------------PHYSICAL EXAM--------------------------------------      Constitutional/General: Alert and oriented x3, well appearing, non toxic in NAD  Head: NC/AT  Eyes: PERRL, EOMI  Mouth: Oropharynx clear, handling secretions, no trismus  Neck: Supple, full ROM, no meningeal signs  Pulmonary: Lungs clear to auscultation bilaterally, no wheezes, rales, or rhonchi. Not in respiratory distress  Cardiovascular:  Regular rate and rhythm, no murmurs, gallops, or rubs. 2+ distal pulses  Abdomen: Soft, non tender, non distended,   Extremities: Moves all extremities x 4. Warm and well perfused  Skin: warm and dry without rash  Neurologic: GCS 15,  Psych: Normal Affect      ------------------------------ ED COURSE/MEDICAL DECISION MAKING----------------------  Medications   potassium chloride (KLOR-CON M) extended release tablet 20 mEq (20 mEq Oral Given 4/15/21 2119)         Medical Decision Making:    Exam, EKG and routine labs including troponin. EKG was sinus with no ST segment elevation. Labs were normal including troponin. Counseling: Patient will be discharged with recommendations to follow-up closely with PCP and also referral given to cardiology. The emergency provider has spoken with the patient and discussed todays results, in addition to providing specific details for the plan of care and counseling regarding the diagnosis and prognosis. Questions are answered at this time and they are agreeable with the plan.      --------------------------------- IMPRESSION AND DISPOSITION ---------------------------------    IMPRESSION  1.  Palpitations        DISPOSITION  Disposition: Discharge to home  Patient condition is fair                  Ramandeep Jean MD  04/24/21 [id number]

## 2021-04-30 ENCOUNTER — OFFICE VISIT (OUTPATIENT)
Dept: FAMILY MEDICINE CLINIC | Age: 66
End: 2021-04-30
Payer: MEDICARE

## 2021-04-30 VITALS
DIASTOLIC BLOOD PRESSURE: 87 MMHG | TEMPERATURE: 97.1 F | OXYGEN SATURATION: 98 % | SYSTOLIC BLOOD PRESSURE: 138 MMHG | BODY MASS INDEX: 33.27 KG/M2 | HEIGHT: 67 IN | WEIGHT: 212 LBS | HEART RATE: 78 BPM | RESPIRATION RATE: 16 BRPM

## 2021-04-30 DIAGNOSIS — E03.9 HYPOTHYROIDISM, UNSPECIFIED TYPE: Chronic | ICD-10-CM

## 2021-04-30 DIAGNOSIS — Z12.31 ENCOUNTER FOR SCREENING MAMMOGRAM FOR MALIGNANT NEOPLASM OF BREAST: ICD-10-CM

## 2021-04-30 DIAGNOSIS — E87.6 HYPOKALEMIA: ICD-10-CM

## 2021-04-30 DIAGNOSIS — R00.2 PALPITATIONS: Primary | ICD-10-CM

## 2021-04-30 DIAGNOSIS — I10 ESSENTIAL HYPERTENSION: ICD-10-CM

## 2021-04-30 DIAGNOSIS — Z76.0 MEDICATION REFILL: ICD-10-CM

## 2021-04-30 PROCEDURE — 36415 COLL VENOUS BLD VENIPUNCTURE: CPT | Performed by: FAMILY MEDICINE

## 2021-04-30 PROCEDURE — 1090F PRES/ABSN URINE INCON ASSESS: CPT | Performed by: FAMILY MEDICINE

## 2021-04-30 PROCEDURE — 1123F ACP DISCUSS/DSCN MKR DOCD: CPT | Performed by: FAMILY MEDICINE

## 2021-04-30 PROCEDURE — 90732 PPSV23 VACC 2 YRS+ SUBQ/IM: CPT | Performed by: FAMILY MEDICINE

## 2021-04-30 PROCEDURE — 4040F PNEUMOC VAC/ADMIN/RCVD: CPT | Performed by: FAMILY MEDICINE

## 2021-04-30 PROCEDURE — G0009 ADMIN PNEUMOCOCCAL VACCINE: HCPCS | Performed by: FAMILY MEDICINE

## 2021-04-30 PROCEDURE — G8399 PT W/DXA RESULTS DOCUMENT: HCPCS | Performed by: FAMILY MEDICINE

## 2021-04-30 PROCEDURE — 1036F TOBACCO NON-USER: CPT | Performed by: FAMILY MEDICINE

## 2021-04-30 PROCEDURE — 99214 OFFICE O/P EST MOD 30 MIN: CPT | Performed by: FAMILY MEDICINE

## 2021-04-30 PROCEDURE — G8427 DOCREV CUR MEDS BY ELIG CLIN: HCPCS | Performed by: FAMILY MEDICINE

## 2021-04-30 PROCEDURE — 3017F COLORECTAL CA SCREEN DOC REV: CPT | Performed by: FAMILY MEDICINE

## 2021-04-30 PROCEDURE — G8417 CALC BMI ABV UP PARAM F/U: HCPCS | Performed by: FAMILY MEDICINE

## 2021-04-30 RX ORDER — METOPROLOL SUCCINATE 25 MG/1
25 TABLET, EXTENDED RELEASE ORAL DAILY
Qty: 30 TABLET | Refills: 5 | Status: SHIPPED
Start: 2021-04-30 | End: 2021-12-28 | Stop reason: SDUPTHER

## 2021-04-30 RX ORDER — ISOPROPYL ALCOHOL 0.75 G/1
SWAB TOPICAL
Qty: 100 EACH | Refills: 11 | Status: SHIPPED | OUTPATIENT
Start: 2021-04-30

## 2021-04-30 RX ORDER — MOMETASONE FUROATE 50 UG/1
2 SPRAY, METERED NASAL DAILY
Qty: 1 INHALER | Refills: 3 | Status: SHIPPED
Start: 2021-04-30 | End: 2021-09-14

## 2021-04-30 RX ORDER — POLYETHYLENE GLYCOL 3350 17 G/17G
POWDER, FOR SOLUTION ORAL
Qty: 510 G | Refills: 11 | Status: SHIPPED | OUTPATIENT
Start: 2021-04-30

## 2021-04-30 RX ORDER — LANCETS 33 GAUGE
EACH MISCELLANEOUS
Qty: 100 EACH | Refills: 5 | Status: SHIPPED
Start: 2021-04-30 | End: 2022-05-24

## 2021-04-30 RX ORDER — TIOTROPIUM BROMIDE INHALATION SPRAY 1.56 UG/1
2 SPRAY, METERED RESPIRATORY (INHALATION) DAILY
Qty: 1 INHALER | Refills: 5 | Status: SHIPPED | OUTPATIENT
Start: 2021-04-30

## 2021-04-30 RX ORDER — LEVOTHYROXINE SODIUM 0.07 MG/1
75 TABLET ORAL DAILY
Qty: 30 TABLET | Refills: 5 | Status: SHIPPED
Start: 2021-04-30 | End: 2021-11-01

## 2021-04-30 RX ORDER — GLUCOSAMINE HCL/CHONDROITIN SU 500-400 MG
CAPSULE ORAL
Qty: 100 STRIP | Refills: 5 | Status: SHIPPED
Start: 2021-04-30 | End: 2022-06-09

## 2021-04-30 SDOH — ECONOMIC STABILITY: TRANSPORTATION INSECURITY
IN THE PAST 12 MONTHS, HAS LACK OF TRANSPORTATION KEPT YOU FROM MEETINGS, WORK, OR FROM GETTING THINGS NEEDED FOR DAILY LIVING?: NO

## 2021-04-30 SDOH — ECONOMIC STABILITY: TRANSPORTATION INSECURITY
IN THE PAST 12 MONTHS, HAS THE LACK OF TRANSPORTATION KEPT YOU FROM MEDICAL APPOINTMENTS OR FROM GETTING MEDICATIONS?: NOT ASKED

## 2021-04-30 SDOH — ECONOMIC STABILITY: FOOD INSECURITY: WITHIN THE PAST 12 MONTHS, THE FOOD YOU BOUGHT JUST DIDN'T LAST AND YOU DIDN'T HAVE MONEY TO GET MORE.: NEVER TRUE

## 2021-04-30 SDOH — ECONOMIC STABILITY: INCOME INSECURITY: HOW HARD IS IT FOR YOU TO PAY FOR THE VERY BASICS LIKE FOOD, HOUSING, MEDICAL CARE, AND HEATING?: NOT HARD AT ALL

## 2021-04-30 ASSESSMENT — PATIENT HEALTH QUESTIONNAIRE - PHQ9
SUM OF ALL RESPONSES TO PHQ QUESTIONS 1-9: 0
SUM OF ALL RESPONSES TO PHQ9 QUESTIONS 1 & 2: 0
SUM OF ALL RESPONSES TO PHQ QUESTIONS 1-9: 0
2. FEELING DOWN, DEPRESSED OR HOPELESS: 0
SUM OF ALL RESPONSES TO PHQ QUESTIONS 1-9: 0

## 2021-04-30 ASSESSMENT — ENCOUNTER SYMPTOMS
COUGH: 0
WHEEZING: 0

## 2021-04-30 NOTE — PROGRESS NOTES
4/30/2021    Michelle Castillo is a 77 y.o. female here for   Chief Complaint   Patient presents with    Diabetes     4/30/2021 FBS at home 68; follow endocrine    Hypertension    Health Maintenance     DM foot-3/2021 [facility]-will get report     Regarding hypertension. Patient is not monitoring home blood pressures. Cardiovascular risk factors: advanced age (older than 54 for men, 72 for women), dyslipidemia, hypertension and obesity (BMI >= 30 kg/m2). Patient does not smoke. Currently on chlorthalidone 25 mg daily, tekturna 300 mg daily, and now metoprolol xl 25 mg daily which was started in the ED. . Taking as prescribed. No adverse effects. Today,  BP: 138/87 and she is asymptomatic. BP Readings from Last 3 Encounters:   04/30/21 138/87   04/15/21 (!) 156/94   09/08/20 135/71     Patient denies chest pain, diaphoresis, dyspnea, dyspnea on exertion, peripheral edema, headache, vision changes. Felt like she had a shock to her her, went on for several days. It was very uncomfortable. Was prescrbed metoprolol   She is now feeling better. She is taking metoprolol xl. She is hesitant to get the COVID vaccine. Wt Readings from Last 3 Encounters:   04/30/21 212 lb (96.2 kg)   04/15/21 200 lb (90.7 kg)   09/08/20 213 lb (96.6 kg)       She  reports that she has never smoked. She has never used smokeless tobacco.    Medications and allergies reviewed and updated in chart.     Current Outpatient Medications   Medication Sig Dispense Refill    metoprolol succinate (TOPROL XL) 25 MG extended release tablet Take 1 tablet by mouth daily for 15 days 15 tablet 0    chlorthalidone (HYGROTON) 25 MG tablet Take 1 tablet by mouth daily 30 tablet 2    ibuprofen (ADVIL;MOTRIN) 800 MG tablet Take 1 tablet by mouth every 8 hours as needed for Pain 30 tablet 1    polyethylene glycol (GLYCOLAX) 17 GM/SCOOP powder MIX 1 CAPFUL(17GM) IN A BEVERAGE AND DRINK EVERY  g 11    triamcinolone (KENALOG) 0.1 % cream Apply topically 2 times daily to lower extremities up to 2 weeks. 1 Tube 1    Alcohol Swabs (B-D SINGLE USE SWABS REGULAR) PADS USE AS DIRECTED 100 each 11    blood glucose monitor strips Test 1 times a day & as needed for symptoms of irregular blood glucose. (pt has one touch verio Flex at home) 100 strip 5    OneTouch Delica Lancets 35X MISC TEST EVERY DAY AS DIRECTED 100 each 5    ipratropium (ATROVENT HFA) 17 MCG/ACT inhaler Inhale 2 puffs into the lungs 3 times daily as needed for Wheezing 1 Inhaler 1    levothyroxine (SYNTHROID) 75 MCG tablet Take 1 tablet by mouth Daily 30 tablet 5    tiotropium (SPIRIVA RESPIMAT) 1.25 MCG/ACT AERS inhaler Inhale 2 puffs into the lungs daily 1 Inhaler 5    ondansetron (ZOFRAN) 4 MG tablet Take 1 tablet by mouth 3 times daily as needed for Nausea or Vomiting 15 tablet 0    Handicap Placard MISC by Does not apply route Patient cannot walk 200 ft without stopping to rest.    Expiration 1/2023 1 each 0    ammonium lactate (AMLACTIN) 12 % lotion APPLY ONCE DAILY 225 g 5    Cholecalciferol (VITAMIN D3) 2000 units CAPS TK 1 C PO QD  5    levocetirizine (XYZAL) 5 MG tablet TK 1 T PO QHS  0    ketotifen (ZADITOR) 0.025 % ophthalmic solution INT 1 GTT IN OU BID PRF ALLERGIES  5    aliskiren (TEKTURNA) 300 MG tablet Take 300 mg by mouth daily. No current facility-administered medications for this visit. Patient'spast medical, surgical, social and/or family history reviewed, updated in chart, and are non-contributory (unless otherwise stated). Review of Systems  Review of Systems   Constitutional: Negative for chills and fever. Respiratory: Negative for cough and wheezing. Cardiovascular: Negative for chest pain and leg swelling. Neurological: Negative for dizziness and headaches.        PE:  VS:  /87   Pulse 78   Temp 97.1 °F (36.2 °C) (Temporal)   Resp 16   Ht 5' 7\" (1.702 m)   Wt 212 lb (96.2 kg)   SpO2 98%   Breastfeeding No   BMI 33.20 kg/m²   Physical Exam  Constitutional:       Appearance: She is well-developed. HENT:      Head: Normocephalic and atraumatic. Cardiovascular:      Rate and Rhythm: Normal rate and regular rhythm. Heart sounds: No murmur. No friction rub. No gallop. Pulmonary:      Effort: Pulmonary effort is normal.      Breath sounds: Normal breath sounds. No wheezing or rales. Skin:     General: Skin is warm and dry. Neurological:      Mental Status: She is alert and oriented to person, place, and time. Assessment/Plan:  Eber Sewell was seen today for diabetes, hypertension and health maintenance. Diagnoses and all orders for this visit:    Palpitations  Long standing hypertension as well as thyroid d/o  At risk for arrhythmias   Check ECHo   Check holter  Repeat bmp and magnesium levels  F/u pending results  -     BASIC METABOLIC PANEL; Future  -     MAGNESIUM; Future  -     ECHO Complete 2D W Doppler W Color; Future  -     Holter Monitor 24 Hour; Future    Hypothyroidism, unspecified type  F/u with endocrine  tsh elevated but pt symptomatic   -     levothyroxine (SYNTHROID) 75 MCG tablet; Take 1 tablet by mouth Daily    Encounter for screening mammogram for malignant neoplasm of breast  -     BECCA DIGITAL SCREEN W OR WO CAD BILATERAL; Future    Medication refill  -     polyethylene glycol (GLYCOLAX) 17 GM/SCOOP powder; MIX 1 CAPFUL(17GM) IN A BEVERAGE AND DRINK EVERY DAY    Hypokalemia  Repeat potassium today  Not currently on supplments but has been eating more bananas  -     BASIC METABOLIC PANEL; Future  -     MAGNESIUM; Future    Essential hypertension  As above  -     ECHO Complete 2D W Doppler W Color; Future  -     Holter Monitor 24 Hour; Future    Other orders  -     metoprolol succinate (TOPROL XL) 25 MG extended release tablet; Take 1 tablet by mouth daily  -     tiotropium (SPIRIVA RESPIMAT) 1.25 MCG/ACT AERS inhaler;  Inhale 2 puffs into the lungs daily  -     ipratropium (ATROVENT HFA) 17 MCG/ACT inhaler; Inhale 2 puffs into the lungs 3 times daily as needed for Wheezing  -     blood glucose monitor strips; Test 1 times a day & as needed for symptoms of irregular blood glucose. (pt has one touch verio Flex at home)  -     Alcohol Swabs (B-D SINGLE USE SWABS REGULAR) PADS; USE AS DIRECTED  -     OneTouch Delica Lancets 81H MISC; TEST EVERY DAY AS DIRECTED  -     Pneumococcal polysaccharide vaccine 23-valent greater than or equal to 3yo subcutaneous/IM  -     mometasone (NASONEX) 50 MCG/ACT nasal spray; 2 sprays by Nasal route daily        F/u in 4 months    Advised patient to call with any new medication issues. All questions answered.   Call or go to emergency department ifsymptoms worsen or persist.

## 2021-05-20 ENCOUNTER — HOSPITAL ENCOUNTER (OUTPATIENT)
Dept: SLEEP CENTER | Age: 66
Discharge: HOME | End: 2021-05-20
Payer: MEDICARE

## 2021-05-20 DIAGNOSIS — R00.2 PALPITATIONS: ICD-10-CM

## 2021-05-20 DIAGNOSIS — I49.9 IRREGULAR HEART RATE: Primary | ICD-10-CM

## 2021-05-20 DIAGNOSIS — I10 ESSENTIAL HYPERTENSION: ICD-10-CM

## 2021-05-20 PROCEDURE — 93225 XTRNL ECG REC<48 HRS REC: CPT

## 2021-05-20 PROCEDURE — 93226 XTRNL ECG REC<48 HR SCAN A/R: CPT

## 2021-05-21 ENCOUNTER — HOSPITAL ENCOUNTER (OUTPATIENT)
Dept: NON INVASIVE DIAGNOSTICS | Age: 66
Discharge: HOME | End: 2021-05-21
Payer: MEDICARE

## 2021-05-21 DIAGNOSIS — R00.2 PALPITATIONS: ICD-10-CM

## 2021-05-21 DIAGNOSIS — I10 ESSENTIAL HYPERTENSION: ICD-10-CM

## 2021-05-21 LAB
LV EF: 60 %
LVEF MODALITY: NORMAL

## 2021-05-21 PROCEDURE — 93306 TTE W/DOPPLER COMPLETE: CPT

## 2021-06-04 ENCOUNTER — HOSPITAL ENCOUNTER (OUTPATIENT)
Age: 66
Discharge: HOME | End: 2021-06-04
Payer: MEDICARE

## 2021-06-04 LAB
ALBUMIN SERPL-MCNC: 4.2 G/DL (ref 3.5–5.2)
ALP BLD-CCNC: 127 U/L (ref 35–104)
ALT SERPL-CCNC: 14 U/L (ref 0–32)
ANION GAP SERPL CALCULATED.3IONS-SCNC: 8 MMOL/L (ref 7–16)
AST SERPL-CCNC: 21 U/L (ref 0–31)
BILIRUB SERPL-MCNC: 0.5 MG/DL (ref 0–1.2)
BUN BLDV-MCNC: 23 MG/DL (ref 6–23)
CALCIUM SERPL-MCNC: 10.1 MG/DL (ref 8.6–10.2)
CHLORIDE BLD-SCNC: 101 MMOL/L (ref 98–107)
CHOLESTEROL, TOTAL: 297 MG/DL (ref 0–199)
CO2: 28 MMOL/L (ref 22–29)
CREAT SERPL-MCNC: 0.7 MG/DL (ref 0.5–1)
GFR AFRICAN AMERICAN: >60
GFR NON-AFRICAN AMERICAN: >60 ML/MIN/1.73
GLUCOSE BLD-MCNC: 98 MG/DL (ref 74–99)
HBA1C MFR BLD: 6.2 % (ref 4–5.6)
HDLC SERPL-MCNC: 72 MG/DL
LDL CHOLESTEROL CALCULATED: 208 MG/DL (ref 0–99)
POTASSIUM SERPL-SCNC: 4 MMOL/L (ref 3.5–5)
SODIUM BLD-SCNC: 137 MMOL/L (ref 132–146)
T4 FREE: 0.81 NG/DL (ref 0.93–1.7)
TOTAL PROTEIN: 7.1 G/DL (ref 6.4–8.3)
TRIGL SERPL-MCNC: 83 MG/DL (ref 0–149)
TSH SERPL DL<=0.05 MIU/L-ACNC: 6.54 UIU/ML (ref 0.27–4.2)
VLDLC SERPL CALC-MCNC: 17 MG/DL

## 2021-06-04 PROCEDURE — 84443 ASSAY THYROID STIM HORMONE: CPT

## 2021-06-04 PROCEDURE — 84439 ASSAY OF FREE THYROXINE: CPT

## 2021-06-04 PROCEDURE — 83036 HEMOGLOBIN GLYCOSYLATED A1C: CPT

## 2021-06-04 PROCEDURE — 83721 ASSAY OF BLOOD LIPOPROTEIN: CPT

## 2021-06-04 PROCEDURE — 80053 COMPREHEN METABOLIC PANEL: CPT

## 2021-06-04 PROCEDURE — 36415 COLL VENOUS BLD VENIPUNCTURE: CPT

## 2021-06-04 PROCEDURE — 80061 LIPID PANEL: CPT

## 2021-06-08 LAB
Lab: NORMAL
REPORT: NORMAL
THIS TEST SENT TO: NORMAL

## 2021-06-09 ENCOUNTER — TELEPHONE (OUTPATIENT)
Dept: FAMILY MEDICINE CLINIC | Age: 66
End: 2021-06-09

## 2021-06-09 RX ORDER — DEXTROMETHORPHAN HYDROBROMIDE AND PROMETHAZINE HYDROCHLORIDE 15; 6.25 MG/5ML; MG/5ML
5 SYRUP ORAL 4 TIMES DAILY PRN
Qty: 1 BOTTLE | Refills: 0 | Status: SHIPPED | OUTPATIENT
Start: 2021-06-09 | End: 2021-06-19

## 2021-06-09 NOTE — TELEPHONE ENCOUNTER
There's a cough syrup that has some antinausea medication in it  We can try that for now if she would like. Some other things that may help include         - Increase fluids and rest       - Anti histamine PRN       - If congested may add sudafed or Zyrtec-D       - Zinc 220 mg daily X 7 days       -Vitamin C 500mg twice daily X 7 days       - Vitamin D 1000mg daily       - Mucinex 600mg twice daily X 10 days           -It is optimal for a standard 14-day quarantine period for people living, working, congregating together in close proximity. An alternative quarantine regimen is as follows: Persons who were exposed may test on day 5 or later post exposure, if testing is negative and they displayed no symptoms they may come out of quarantine on day 7 and continue to monitor for symptoms for total period of 14 days.

## 2021-06-09 NOTE — TELEPHONE ENCOUNTER
Pt called in and stated that she was diagnosed with COVID 2 days ago. Patient would like to know if you can send in a cough syrup and also zofran for her to help with her symptoms?     Please advise  Thanks

## 2021-06-14 ENCOUNTER — TELEPHONE (OUTPATIENT)
Dept: FAMILY MEDICINE CLINIC | Age: 66
End: 2021-06-14

## 2021-06-14 DIAGNOSIS — K52.9 GASTROENTERITIS: ICD-10-CM

## 2021-06-14 RX ORDER — ONDANSETRON 4 MG/1
4 TABLET, FILM COATED ORAL 3 TIMES DAILY PRN
Qty: 15 TABLET | Refills: 0 | Status: SHIPPED
Start: 2021-06-14 | End: 2021-12-29

## 2021-06-14 NOTE — TELEPHONE ENCOUNTER
Patient called stating she has not been able to eat for the last 3 days due to to nausea (ongoing). States the cough syrup has helped with cough but not with the nausea. She requests a medication, possibly a renewal for Zofran (worked for her in the past). Uses Jonn Steele.

## 2021-06-29 ENCOUNTER — OFFICE VISIT (OUTPATIENT)
Dept: FAMILY MEDICINE CLINIC | Age: 66
End: 2021-06-29
Payer: MEDICARE

## 2021-06-29 VITALS
DIASTOLIC BLOOD PRESSURE: 80 MMHG | WEIGHT: 200.6 LBS | BODY MASS INDEX: 31.48 KG/M2 | HEIGHT: 67 IN | SYSTOLIC BLOOD PRESSURE: 122 MMHG | OXYGEN SATURATION: 97 % | TEMPERATURE: 98 F | RESPIRATION RATE: 16 BRPM | HEART RATE: 77 BPM

## 2021-06-29 DIAGNOSIS — M54.50 ACUTE RIGHT-SIDED LOW BACK PAIN WITHOUT SCIATICA: Primary | ICD-10-CM

## 2021-06-29 PROCEDURE — 3017F COLORECTAL CA SCREEN DOC REV: CPT | Performed by: PHYSICIAN ASSISTANT

## 2021-06-29 PROCEDURE — G8417 CALC BMI ABV UP PARAM F/U: HCPCS | Performed by: PHYSICIAN ASSISTANT

## 2021-06-29 PROCEDURE — 1123F ACP DISCUSS/DSCN MKR DOCD: CPT | Performed by: PHYSICIAN ASSISTANT

## 2021-06-29 PROCEDURE — 1036F TOBACCO NON-USER: CPT | Performed by: PHYSICIAN ASSISTANT

## 2021-06-29 PROCEDURE — 4040F PNEUMOC VAC/ADMIN/RCVD: CPT | Performed by: PHYSICIAN ASSISTANT

## 2021-06-29 PROCEDURE — 1090F PRES/ABSN URINE INCON ASSESS: CPT | Performed by: PHYSICIAN ASSISTANT

## 2021-06-29 PROCEDURE — G8399 PT W/DXA RESULTS DOCUMENT: HCPCS | Performed by: PHYSICIAN ASSISTANT

## 2021-06-29 PROCEDURE — 99213 OFFICE O/P EST LOW 20 MIN: CPT | Performed by: PHYSICIAN ASSISTANT

## 2021-06-29 PROCEDURE — G8427 DOCREV CUR MEDS BY ELIG CLIN: HCPCS | Performed by: PHYSICIAN ASSISTANT

## 2021-06-29 RX ORDER — METHYLPREDNISOLONE 4 MG/1
TABLET ORAL
Qty: 1 KIT | Refills: 0 | Status: SHIPPED | OUTPATIENT
Start: 2021-06-29 | End: 2021-07-05

## 2021-06-29 RX ORDER — CYCLOBENZAPRINE HCL 5 MG
5 TABLET ORAL NIGHTLY PRN
Qty: 10 TABLET | Refills: 0 | Status: SHIPPED | OUTPATIENT
Start: 2021-06-29 | End: 2021-07-09

## 2021-06-29 NOTE — PROGRESS NOTES
BMI 31.42 kg/m²    Oxygen Saturation Interpretation: Normal.    Constitutional:  Alert, development consistent with age. Neck:  Normal ROM. Supple. No TTP. Lungs:  Clear to auscultation and breath sounds equal.  Heart:  Regular rate and rhythm, normal heart sounds, without pathological murmurs, ectopy, gallops, or rubs. Abdomen:  Soft, nontender, good bowel sounds. No firm or pulsatile mass. Back: Tenderness: TTP over the right SI joint and right lumbar paravertebral musculature with no appreciable midline tenderness. Swelling: No edema. Range of Motion: Decreased lateral and front to back ROM due to pain. CVA Tenderness: No CVA tenderness bilaterally. Straight leg raising:  Negative straight leg raise. Skin:  No bruising, redness, abrasions, or rashes. Distal Function:              Motor deficit: Strength 5/5 in LE's bilaterally. Sensory deficit: Distal sensation intact. Pulse deficit: Distal pulses 2+ and bounding. Calf Tenderness:  No bilateral calf tenderness. Negative Kaitlin's sign bilaterally               Reflexes: Intact at knee and achilles bilaterally. Gait:  No antalgia noted. Skin:  Normal turgor. Warm, dry, without visible rash. Neurological:  Alert and oriented. Motor functions intact. Lab / Imaging Results   (All laboratory and radiology results have been personally reviewed by myself)  Labs:  No results found for this visit on 06/29/21. Imaging: All Radiology results interpreted by Radiologist unless otherwise noted. Assessment / Plan     Impression(s):  Cameron You was seen today for back pain. Diagnoses and all orders for this visit:    Acute right-sided low back pain without sciatica  -     methylPREDNISolone (MEDROL DOSEPACK) 4 MG tablet; Take by mouth. -     cyclobenzaprine (FLEXERIL) 5 MG tablet;  Take 1 tablet by mouth nightly as needed for Muscle spasms        Disposition:  Disposition: Discharge to home. Scripts written for a Medrol Dosepak and as needed Flexeril, side effects discussed. Advise rest, ice, and/or moist heat for additional relief. Avoid working/driving after taking muscle relaxer due to sedation. PCP in 1-2 weeks if symptoms persist. ED sooner if symptoms worsen or change. ED immediately with any fever, severe or worsening back pain, paresthesias, weakness, or GI/ incontinence. Pt states understanding and is in agreement with this care plan. All questions answered. Polina Burnett PA-C

## 2021-07-05 DIAGNOSIS — I10 ESSENTIAL HYPERTENSION: Chronic | ICD-10-CM

## 2021-07-06 RX ORDER — CHLORTHALIDONE 25 MG/1
25 TABLET ORAL DAILY
Qty: 30 TABLET | Refills: 0 | Status: SHIPPED
Start: 2021-07-06 | End: 2021-08-05 | Stop reason: SDUPTHER

## 2021-07-09 ENCOUNTER — OFFICE VISIT (OUTPATIENT)
Dept: FAMILY MEDICINE CLINIC | Age: 66
End: 2021-07-09
Payer: MEDICARE

## 2021-07-09 VITALS
HEART RATE: 87 BPM | HEIGHT: 67 IN | OXYGEN SATURATION: 93 % | RESPIRATION RATE: 20 BRPM | BODY MASS INDEX: 31.89 KG/M2 | WEIGHT: 203.2 LBS | SYSTOLIC BLOOD PRESSURE: 143 MMHG | DIASTOLIC BLOOD PRESSURE: 77 MMHG | TEMPERATURE: 97.6 F

## 2021-07-09 DIAGNOSIS — U09.9 POST-COVID-19 CONDITION: Primary | ICD-10-CM

## 2021-07-09 PROCEDURE — G8399 PT W/DXA RESULTS DOCUMENT: HCPCS | Performed by: FAMILY MEDICINE

## 2021-07-09 PROCEDURE — 1036F TOBACCO NON-USER: CPT | Performed by: FAMILY MEDICINE

## 2021-07-09 PROCEDURE — 1090F PRES/ABSN URINE INCON ASSESS: CPT | Performed by: FAMILY MEDICINE

## 2021-07-09 PROCEDURE — 99213 OFFICE O/P EST LOW 20 MIN: CPT | Performed by: FAMILY MEDICINE

## 2021-07-09 PROCEDURE — 4040F PNEUMOC VAC/ADMIN/RCVD: CPT | Performed by: FAMILY MEDICINE

## 2021-07-09 PROCEDURE — G8427 DOCREV CUR MEDS BY ELIG CLIN: HCPCS | Performed by: FAMILY MEDICINE

## 2021-07-09 PROCEDURE — G8417 CALC BMI ABV UP PARAM F/U: HCPCS | Performed by: FAMILY MEDICINE

## 2021-07-09 PROCEDURE — 1123F ACP DISCUSS/DSCN MKR DOCD: CPT | Performed by: FAMILY MEDICINE

## 2021-07-09 PROCEDURE — 3017F COLORECTAL CA SCREEN DOC REV: CPT | Performed by: FAMILY MEDICINE

## 2021-07-09 NOTE — PROGRESS NOTES
7/9/2021    Pb Jeter is a 77 y.o. femalehere for:    HPI:  CC- post COVID    Tested positive on beginning of June 2nd   Had chills but not fever. No cough but did have diarrhea. No sob  Felt tired, fatigued  Too much postnasal drip  Using OTC anti allergic medications  No hx of blood clots  Trying to keep herself hydrated  Patient states that she feels much better  Diarrhea has completely stopped but appetite is still decreased and patient gets easily full.       BP Readings from Last 3 Encounters:   07/09/21 (!) 143/77   06/29/21 122/80   04/30/21 138/87     Current Outpatient Medications   Medication Sig Dispense Refill    chlorthalidone (HYGROTON) 25 MG tablet TAKE 1 TABLET BY MOUTH DAILY 30 tablet 0    cyclobenzaprine (FLEXERIL) 5 MG tablet Take 1 tablet by mouth nightly as needed for Muscle spasms 10 tablet 0    ondansetron (ZOFRAN) 4 MG tablet Take 1 tablet by mouth 3 times daily as needed for Nausea or Vomiting 15 tablet 0    levothyroxine (SYNTHROID) 75 MCG tablet Take 1 tablet by mouth Daily 30 tablet 5    tiotropium (SPIRIVA RESPIMAT) 1.25 MCG/ACT AERS inhaler Inhale 2 puffs into the lungs daily 1 Inhaler 5    blood glucose monitor strips Test 1 times a day & as needed for symptoms of irregular blood glucose. (pt has one touch verio Flex at home) 100 strip 5    Alcohol Swabs (B-D SINGLE USE SWABS REGULAR) PADS USE AS DIRECTED 100 each 11    OneTouch Delica Lancets 26I MISC TEST EVERY DAY AS DIRECTED 100 each 5    polyethylene glycol (GLYCOLAX) 17 GM/SCOOP powder MIX 1 CAPFUL(17GM) IN A BEVERAGE AND DRINK EVERY  g 11    mometasone (NASONEX) 50 MCG/ACT nasal spray 2 sprays by Nasal route daily 1 Inhaler 3    ibuprofen (ADVIL;MOTRIN) 800 MG tablet Take 1 tablet by mouth every 8 hours as needed for Pain 30 tablet 1    Handicap Placard MISC by Does not apply route Patient cannot walk 200 ft without stopping to rest.    Expiration 1/2023 1 each 0    ammonium lactate (AMLACTIN) 12 % lotion APPLY ONCE DAILY 225 g 5    Cholecalciferol (VITAMIN D3) 2000 units CAPS TK 1 C PO QD  5    levocetirizine (XYZAL) 5 MG tablet TK 1 T PO QHS  0    ketotifen (ZADITOR) 0.025 % ophthalmic solution INT 1 GTT IN OU BID PRF ALLERGIES  5    aliskiren (TEKTURNA) 300 MG tablet Take 300 mg by mouth daily.  metoprolol succinate (TOPROL XL) 25 MG extended release tablet Take 1 tablet by mouth daily 30 tablet 5    ipratropium (ATROVENT HFA) 17 MCG/ACT inhaler Inhale 2 puffs into the lungs 3 times daily as needed for Wheezing (Patient not taking: Reported on 7/9/2021) 1 Inhaler 5    triamcinolone (KENALOG) 0.1 % cream Apply topically 2 times daily to lower extremities up to 2 weeks. (Patient not taking: Reported on 7/9/2021) 1 Tube 1     No current facility-administered medications for this visit. Allergies   Allergen Reactions    Penicillins Rash       Past Medical & Surgical History:      Diagnosis Date    Bronchitis     Hyperlipidemia     Hypertension     Thyroid disease      Past Surgical History:   Procedure Laterality Date    COLONOSCOPY  1/21/2015    UPPER GASTROINTESTINAL ENDOSCOPY  1/21/2015       Family History:  No family history on file. Social History:  Social History     Tobacco Use    Smoking status: Never Smoker    Smokeless tobacco: Never Used   Substance Use Topics    Alcohol use: No     Alcohol/week: 0.0 standard drinks       Immunization History   Administered Date(s) Administered    Influenza Vaccine, unspecified formulation 11/17/2008    Influenza, Quadv, IM, (6 mo and older Fluzone, Flulaval, Fluarix and 3 yrs and older Afluria) 11/17/2008    Pneumococcal Polysaccharide (Zaxnkypep53) 12/17/2008, 11/20/2014, 10/29/2015, 04/30/2021    Tdap (Boostrix, Adacel) 08/10/2008, 10/05/2012, 08/15/2017       Review of Systems   Constitutional: Positive for appetite change. Negative for chills, fatigue (Improved) and fever. HENT: Positive for congestion and postnasal drip. Negative for sinus pressure and sinus pain. Eyes: Negative for visual disturbance. Respiratory: Negative for cough and shortness of breath. Cardiovascular: Negative for chest pain and leg swelling. Gastrointestinal: Negative for abdominal pain, diarrhea (Resolved), nausea and vomiting. Genitourinary: Negative for dysuria and hematuria. Skin: Negative for rash. Neurological: Negative for weakness, light-headedness and numbness. Psychiatric/Behavioral: Negative for agitation and dysphoric mood. VS:  BP (!) 143/77   Pulse 87   Temp 97.6 °F (36.4 °C)   Resp 20   Ht 5' 7\" (1.702 m)   Wt 203 lb 3.2 oz (92.2 kg)   SpO2 93%   BMI 31.83 kg/m²     Physical Exam  Vitals reviewed. Constitutional:       General: She is not in acute distress. Appearance: Normal appearance. She is not ill-appearing. HENT:      Head: Normocephalic and atraumatic. Nose: No congestion. Mouth/Throat:      Pharynx: No oropharyngeal exudate or posterior oropharyngeal erythema. Eyes:      General: No scleral icterus. Right eye: No discharge. Left eye: No discharge. Cardiovascular:      Rate and Rhythm: Normal rate and regular rhythm. Heart sounds: Normal heart sounds. No murmur heard. Pulmonary:      Effort: Pulmonary effort is normal. No respiratory distress. Breath sounds: Normal breath sounds. No wheezing or rhonchi. Abdominal:      General: There is no distension. Palpations: Abdomen is soft. Tenderness: There is no abdominal tenderness. Musculoskeletal:      Cervical back: Normal range of motion and neck supple. Right lower leg: No edema. Left lower leg: No edema. Skin:     General: Skin is warm. Findings: No rash. Neurological:      General: No focal deficit present. Mental Status: She is alert and oriented to person, place, and time.    Psychiatric:         Mood and Affect: Mood normal.         Behavior: Behavior normal. Assessment/Plan:  Jeremy Garzon was seen today for post-covid symptoms. Diagnoses and all orders for this visit:    Post-COVID-19 condition  Improving symptoms. Reassurance provided. Patient advised to present to ER or follow-up with us closely if any worsening or new symptoms develop. Follow up: As needed if symptoms worsen or fail to improve    Patient agrees with the above stated plan.       Gilmar Borja MD  PGY-3 Family Medicine

## 2021-07-09 NOTE — PROGRESS NOTES
S: 77 y.o. female with   Chief Complaint   Patient presents with    Post-COVID Symptoms     congestion, cough still lingering        Pt is here for post-COVID cough. Had COVID June 1st.   She does have post-nasal drip. Appetite is still low. GI symptoms have improved. O: VS:  height is 5' 7\" (1.702 m) and weight is 203 lb 3.2 oz (92.2 kg). Her temperature is 97.6 °F (36.4 °C). Her blood pressure is 143/77 (abnormal) and her pulse is 87. Her respiration is 20 and oxygen saturation is 93%. BP Readings from Last 3 Encounters:   07/09/21 (!) 143/77   06/29/21 122/80   04/30/21 138/87     See resident note      Impression/Plan:   1) s/p COVID - improving. Pt reassured. Health Maintenance Due   Topic Date Due    COVID-19 Vaccine (1) Never done    Shingles Vaccine (1 of 2) Never done    Diabetic foot exam  09/24/2020    Breast cancer screen  11/13/2020    Diabetic microalbuminuria test  06/19/2021         Attending Physician Statement  I have discussed the case, including pertinent history and exam findings with the resident. I agree with the documented assessment and plan.       Blair Cooks, MD

## 2021-07-29 ASSESSMENT — ENCOUNTER SYMPTOMS
SINUS PAIN: 0
VOMITING: 0
SHORTNESS OF BREATH: 0
ABDOMINAL PAIN: 0
SINUS PRESSURE: 0
COUGH: 0
NAUSEA: 0
DIARRHEA: 0

## 2021-08-05 DIAGNOSIS — I10 ESSENTIAL HYPERTENSION: Chronic | ICD-10-CM

## 2021-08-06 DIAGNOSIS — I10 ESSENTIAL HYPERTENSION: Chronic | ICD-10-CM

## 2021-08-06 RX ORDER — CHLORTHALIDONE 25 MG/1
25 TABLET ORAL DAILY
Qty: 30 TABLET | Refills: 1 | Status: SHIPPED
Start: 2021-08-06 | End: 2021-08-06

## 2021-08-06 RX ORDER — CHLORTHALIDONE 25 MG/1
25 TABLET ORAL DAILY
Qty: 90 TABLET | Refills: 0 | Status: SHIPPED
Start: 2021-08-06 | End: 2021-11-05

## 2021-09-03 RX ORDER — BEMPEDOIC ACID 180 MG/1
TABLET, FILM COATED ORAL
COMMUNITY
Start: 2021-08-16 | End: 2022-03-31

## 2021-09-14 ENCOUNTER — OFFICE VISIT (OUTPATIENT)
Dept: FAMILY MEDICINE CLINIC | Age: 66
End: 2021-09-14
Payer: MEDICARE

## 2021-09-14 VITALS
DIASTOLIC BLOOD PRESSURE: 78 MMHG | RESPIRATION RATE: 14 BRPM | SYSTOLIC BLOOD PRESSURE: 124 MMHG | WEIGHT: 196 LBS | TEMPERATURE: 97.8 F | OXYGEN SATURATION: 98 % | BODY MASS INDEX: 30.76 KG/M2 | HEIGHT: 67 IN | HEART RATE: 80 BPM

## 2021-09-14 DIAGNOSIS — Z00.00 ROUTINE GENERAL MEDICAL EXAMINATION AT A HEALTH CARE FACILITY: Primary | ICD-10-CM

## 2021-09-14 DIAGNOSIS — E11.9 CONTROLLED TYPE 2 DIABETES MELLITUS WITHOUT COMPLICATION, WITHOUT LONG-TERM CURRENT USE OF INSULIN (HCC): ICD-10-CM

## 2021-09-14 LAB
CREATININE URINE POCT: 50
MICROALBUMIN/CREAT 24H UR: 10 MG/G{CREAT}
MICROALBUMIN/CREAT UR-RTO: 30

## 2021-09-14 PROCEDURE — 82044 UR ALBUMIN SEMIQUANTITATIVE: CPT | Performed by: FAMILY MEDICINE

## 2021-09-14 PROCEDURE — G0439 PPPS, SUBSEQ VISIT: HCPCS | Performed by: FAMILY MEDICINE

## 2021-09-14 PROCEDURE — 3017F COLORECTAL CA SCREEN DOC REV: CPT | Performed by: FAMILY MEDICINE

## 2021-09-14 PROCEDURE — 4040F PNEUMOC VAC/ADMIN/RCVD: CPT | Performed by: FAMILY MEDICINE

## 2021-09-14 PROCEDURE — 1123F ACP DISCUSS/DSCN MKR DOCD: CPT | Performed by: FAMILY MEDICINE

## 2021-09-14 PROCEDURE — 3044F HG A1C LEVEL LT 7.0%: CPT | Performed by: FAMILY MEDICINE

## 2021-09-14 RX ORDER — FLUTICASONE PROPIONATE 50 MCG
SPRAY, SUSPENSION (ML) NASAL
Qty: 16 G | Refills: 5 | Status: SHIPPED | OUTPATIENT
Start: 2021-09-14

## 2021-09-14 RX ORDER — FLUTICASONE PROPIONATE 50 MCG
SPRAY, SUSPENSION (ML) NASAL
COMMUNITY
End: 2021-09-14 | Stop reason: SDUPTHER

## 2021-09-14 RX ORDER — CYCLOBENZAPRINE HCL 5 MG
TABLET ORAL
COMMUNITY
End: 2022-05-31 | Stop reason: ALTCHOICE

## 2021-09-14 RX ORDER — LEVOCETIRIZINE DIHYDROCHLORIDE 5 MG/1
TABLET, FILM COATED ORAL
Qty: 30 TABLET | Refills: 5 | Status: SHIPPED
Start: 2021-09-14 | End: 2021-09-15

## 2021-09-14 ASSESSMENT — PATIENT HEALTH QUESTIONNAIRE - PHQ9
SUM OF ALL RESPONSES TO PHQ QUESTIONS 1-9: 0
2. FEELING DOWN, DEPRESSED OR HOPELESS: 0
SUM OF ALL RESPONSES TO PHQ9 QUESTIONS 1 & 2: 0
SUM OF ALL RESPONSES TO PHQ QUESTIONS 1-9: 0
1. LITTLE INTEREST OR PLEASURE IN DOING THINGS: 0
SUM OF ALL RESPONSES TO PHQ QUESTIONS 1-9: 0

## 2021-09-14 ASSESSMENT — LIFESTYLE VARIABLES: HOW OFTEN DO YOU HAVE A DRINK CONTAINING ALCOHOL: 0

## 2021-09-14 NOTE — PROGRESS NOTES
Medicare Annual Wellness Visit  Name: Mariama Carbone Date: 2021   MRN: [id number] Sex: Female   Age: 77 y.o. Ethnicity: Non- / Non    : 1955 Race: Natalie Ibarra / Kathy Michelle is here for Medicare AWV    Screenings for behavioral, psychosocial and functional/safety risks, and cognitive dysfunction are all negative except as indicated below. These results, as well as other patient data from the [address] [facility], are documented in Flowsheets linked to this Encounter. Cough comes and goes  She had COVID in July  Dry cough  Wonders if she has a sinus infection  Will leave and come back  She was raking leaves      Allergies   Allergen Reactions    Penicillins Rash       Prior to Visit Medications    Medication Sig Taking?  Authorizing Provider   fluticasone (FLONASE) 50 MCG/ACT nasal spray SHAKE LIQUID AND USE 1 SPRAY IN EACH NOSTRIL EVERY DAY AS NEEDED Yes Historical Provider, MD   cyclobenzaprine (FLEXERIL) 5 MG tablet TAKE 1 TABLET BY MOUTH EVERY NIGHT AS NEEDED FOR MUSCLE SPASMS Yes Historical Provider, MD   NEXLETOL 180 MG TABS  Yes Historical Provider, MD   chlorthalidone (HYGROTON) 25 MG tablet TAKE 1 TABLET BY MOUTH DAILY Yes Shawn Mcdaniel MD   ondansetron (ZOFRAN) 4 MG tablet Take 1 tablet by mouth 3 times daily as needed for Nausea or Vomiting Yes Shawn Mcdaniel MD   levothyroxine (SYNTHROID) 75 MCG tablet Take 1 tablet by mouth Daily Yes Shawn Mcdaniel MD   tiotropium (SPIRIVA RESPIMAT) 1.25 MCG/ACT AERS inhaler Inhale 2 puffs into the lungs daily Yes Shawn Mcdaniel MD   blood glucose monitor strips Test 1 times a day & as needed for symptoms of irregular blood glucose. (pt has one touch verio Flex at home) Yes Shawn Mcdaniel MD   Alcohol Swabs (B-D SINGLE USE SWABS REGULAR) PADS USE AS DIRECTED Yes Shawn Mcdaniel MD   [facility] Yes Shawn Mcdaniel MD   polyethylene glycol ([facility]) 17 GM/SCOOP powder MIX 1 CAPFUL(17GM) IN A BEVERAGE AND DRINK EVERY DAY Yes Eros Sharma MD   mometasone (NASONEX) 50 MCG/ACT nasal spray 2 sprays by Nasal route daily Yes Eros Sharma MD   Handicap Placard MISC by Does not apply route Patient cannot walk 200 ft without stopping to rest.    Expiration 1/2023 Yes Eros Sharma MD   ammonium lactate (AMLACTIN) 12 % lotion APPLY ONCE DAILY Yes Eros Sharma MD   Cholecalciferol (VITAMIN D3) 2000 units CAPS TK 1 C PO QD Yes Historical Provider, MD   levocetirizine (XYZAL) 5 MG tablet TK 1 T PO QHS Yes Historical Provider, MD   ketotifen (ZADITOR) 0.025 % ophthalmic solution INT 1 GTT IN OU BID PRF ALLERGIES Yes Historical Provider, MD   aliskiren (TEKTURNA) 300 MG tablet Take 300 mg by mouth daily. Yes Historical Provider, MD   metoprolol succinate (TOPROL XL) 25 MG extended release tablet Take 1 tablet by mouth daily  Eros Sharma MD   ibuprofen (ADVIL;MOTRIN) 800 MG tablet Take 1 tablet by mouth every 8 hours as needed for Pain  Eros Sharma MD       Past Medical History:   Diagnosis Date    Bronchitis     Hyperlipidemia     Hypertension     Thyroid disease        Past Surgical History:   Procedure Laterality Date    COLONOSCOPY  1/21/2015    UPPER GASTROINTESTINAL ENDOSCOPY  1/21/2015       No family history on file.     CareTeam (Including outside providers/suppliers regularly involved in providing care):   Patient Care Team:  Eros Sharma MD as PCP - Bridgette Alberts MD as PCP - [facility] Empaneled Provider  Rudolph Leone MD as Consulting Physician (Nephrology)    Wt Readings from Last 3 Encounters:   09/14/21 196 lb (88.9 kg)   07/09/21 203 lb 3.2 oz (92.2 kg)   06/29/21 200 lb 9.6 oz (91 kg)     Vitals:    09/14/21 1612   BP: 124/78   Site: Left Upper Arm   Position: Sitting   Cuff Size: Medium Adult   Pulse: 80   Resp: 14   Temp: 97.8 °F (36.6 °C)   TempSrc: Temporal   SpO2: 98%   Weight: 196 lb (88.9 kg)   Height: 5' 7\" (1.702 m)     Body mass index is 30.7 kg/m². Based upon direct observation of the patient, evaluation of cognition reveals recent and remote memory intact. PE    /78 (Site: Left Upper Arm, Position: Sitting, Cuff Size: Medium Adult)   Pulse 80   Temp 97.8 °F (36.6 °C) (Temporal)   Resp 14   Ht 5' 7\" (1.702 m)   Wt 196 lb (88.9 kg)   SpO2 98%   BMI 30.70 kg/m²     General Appearance: alert and oriented to person, place and time, well-developed and well nourished and in no acute distress  Perioral puffiness with mild hyperpigmentation  Pulmonary/Chest: clear to auscultation bilaterally- no wheezes, rales or rhonchi, normal air movement, no respiratory distress  Cardiovascular: normal rate, normal S1 and S2, no murmurs and no gallops  Psych - Normal judgement, mildly blunted affect  Neurologic: no cranial nerve deficit, gait and coordination normal and speech normal  Extremities: no erythema, swelling or tenderness of extremities    Patient's complete Health Risk Assessment and screening values have been reviewed and are found in Flowsheets. The following problems were reviewed today and where indicated follow up appointments were made and/or referrals ordered. Positive Risk Factor Screenings with Interventions:          General Health and ACP:  General  In general, how would you say your health is?: Good  In the past 7 days, have you experienced any of the following?  New or Increased Pain, New or Increased Fatigue, Loneliness, Social Isolation, Stress or Anger?: (!) New or Increased Pain, Loneliness  Do you get the social and emotional support that you need?: Yes  Do you have a Living Will?: Yes  Advance Directives     Power of  Living Will ACP-Advance Directive ACP-Power of     Not on File [place] on 05/10/13 Filed [address] [place] Risk Interventions:  · Loneliness: recently lost her  -  in 2021    Health Habits/Nutrition:  Health Habits/Nutrition  Do you exercise for at least 20 minutes 2-3 times per week?: Yes  Have you lost any weight without trying in the past 3 months?: No  Do you eat only one meal per day?: No  Have you seen the dentist within the past year?: Yes  Body mass index: (!) 30.69  Health Habits/Nutrition Interventions:  · Inadequate physical activity:  educational materials provided to promote increased physical activity       Personalized Preventive Plan   Current Health Maintenance Status  Immunization History   Administered Date(s) Administered    Influenza Vaccine, unspecified formulation 11/17/2008    Influenza, Quadv, IM, (6 mo and older Fluzone, Flulaval, Fluarix and 3 yrs and older Afluria) 11/17/2008    Pneumococcal Polysaccharide ([id number]) 12/17/2008, 11/20/2014, 10/29/2015, 04/30/2021    Tdap (Boostrix, Adacel) 08/10/2008, 10/05/2012, 08/15/2017        Health Maintenance   Topic Date Due    COVID-19 Vaccine (1) Never done    Shingles Vaccine (1 of 2) Never done   [employer] Visit (AWV)  Never done    Breast cancer screen  11/13/2020    Diabetic microalbuminuria test  06/19/2021    Flu vaccine (1) 09/01/2021    A1C test (Diabetic or Prediabetic)  06/04/2022    Lipid screen  06/04/2022    TSH testing  06/04/2022    Potassium monitoring  06/04/2022    Creatinine monitoring  06/04/2022    Diabetic retinal exam  08/06/2022    Diabetic foot exam  08/13/2022    Colon cancer screen colonoscopy  12/01/2025    DTaP/Tdap/Td vaccine (4 - Td or Tdap) 08/15/2027    DEXA (modify frequency per FRAX score)  Completed    Pneumococcal 65+ years Vaccine  Completed    Hepatitis A vaccine  Aged Out    Hib vaccine  Aged Out    Meningococcal (ACWY) vaccine  Aged Out    Hepatitis C screen  Discontinued     Recommendations for [employer]Ã¢â€žÂ¢ Due: see orders and patient instructions/AVS.  .   Recommended screening schedule for the next 5-10 years is provided to the patient in written form: see Patient Instructions/AVS.    Sintia Dickey was seen today for medicare awv.     Diagnoses and all orders for this visit:    Routine general medical examination at a health care facility    Controlled type 2 diabetes mellitus without complication, without long-term current use of insulin ([facility])  -  -     POCT Microalbumin    Cough with sore throat  Persistent since diagnosis of covid in July  Concomitant seasonal allergies  Advised resume antihistamine and daily flonase  -     : levocetirizine (XYZAL) 5 MG tablet; TK 1 T PO QHS  -     fluticasone (FLONASE) 50 MCG/ACT nasal spray; SHAKE LIQUID AND USE 1 SPRAY IN EACH NOSTRIL EVERY DAY AS NEEDED

## 2021-09-14 NOTE — PATIENT INSTRUCTIONS
Personalized Preventive Plan for Melvi Fontaine - 9/14/2021  Medicare offers a range of preventive health benefits. Some of the tests and screenings are paid in full while other may be subject to a deductible, co-insurance, and/or copay. Some of these benefits include a comprehensive review of your medical history including lifestyle, illnesses that may run in your family, and various assessments and screenings as appropriate. After reviewing your medical record and screening and assessments performed today your provider may have ordered immunizations, labs, imaging, and/or referrals for you. A list of these orders (if applicable) as well as your Preventive Care list are included within your After Visit Summary for your review. Other Preventive Recommendations:    · A preventive eye exam performed by an eye specialist is recommended every 1-2 years to screen for glaucoma; cataracts, macular degeneration, and other eye disorders. · A preventive dental visit is recommended every 6 months. · Try to get at least 150 minutes of exercise per week or 10,000 steps per day on a pedometer . · Order or download the FREE \"Exercise & Physical Activity: Your Everyday Guide\" from [employer]. Call 2-304.540.1759 or search [employer] online. · You need 5808-8922 mg of calcium and 1854-3020 IU of vitamin D per day. It is possible to meet your calcium requirement with diet alone, but a vitamin D supplement is usually necessary to meet this goal.  · When exposed to the sun, use a sunscreen that protects against both UVA and UVB radiation with an SPF of 30 or greater. Reapply every 2 to 3 hours or after sweating, drying off with a towel, or swimming. · Always wear a seat belt when traveling in a car. Always wear a helmet when riding a bicycle or motorcycle.

## 2021-09-15 RX ORDER — LEVOCETIRIZINE DIHYDROCHLORIDE 5 MG/1
TABLET, FILM COATED ORAL
Qty: 90 TABLET | Refills: 3 | Status: SHIPPED
Start: 2021-09-15 | End: 2022-03-15

## 2021-10-02 ENCOUNTER — HOSPITAL ENCOUNTER (OUTPATIENT)
Age: 66
Discharge: HOME | End: 2021-10-02
Payer: MEDICARE

## 2021-10-02 LAB
ALBUMIN SERPL-MCNC: 4.4 G/DL (ref 3.5–5.2)
ALP BLD-CCNC: 130 U/L (ref 35–104)
ALT SERPL-CCNC: 15 U/L (ref 0–32)
ANION GAP SERPL CALCULATED.3IONS-SCNC: 9 MMOL/L (ref 7–16)
AST SERPL-CCNC: 23 U/L (ref 0–31)
BASOPHILS ABSOLUTE: 0.03 E9/L (ref 0–0.2)
BASOPHILS RELATIVE PERCENT: 0.6 % (ref 0–2)
BILIRUB SERPL-MCNC: 0.4 MG/DL (ref 0–1.2)
BUN BLDV-MCNC: 20 MG/DL (ref 6–23)
CALCIUM SERPL-MCNC: 10.4 MG/DL (ref 8.6–10.2)
CHLORIDE BLD-SCNC: 102 MMOL/L (ref 98–107)
CHOLESTEROL, FASTING: 326 MG/DL (ref 0–199)
CO2: 29 MMOL/L (ref 22–29)
CREAT SERPL-MCNC: 0.7 MG/DL (ref 0.5–1)
EOSINOPHILS ABSOLUTE: 0.21 E9/L (ref 0.05–0.5)
EOSINOPHILS RELATIVE PERCENT: 4.1 % (ref 0–6)
GFR AFRICAN AMERICAN: >60
GFR NON-AFRICAN AMERICAN: >60 ML/MIN/1.73
GLUCOSE BLD-MCNC: 103 MG/DL (ref 74–99)
HCT VFR BLD CALC: 36.7 % (ref 34–48)
HDLC SERPL-MCNC: 72 MG/DL
HEMOGLOBIN: 12.2 G/DL (ref 11.5–15.5)
IMMATURE GRANULOCYTES #: 0.02 E9/L
IMMATURE GRANULOCYTES %: 0.4 % (ref 0–5)
LDL CHOLESTEROL CALCULATED: 235 MG/DL (ref 0–99)
LYMPHOCYTES ABSOLUTE: 1.88 E9/L (ref 1.5–4)
LYMPHOCYTES RELATIVE PERCENT: 36.9 % (ref 20–42)
MCH RBC QN AUTO: 32.1 PG (ref 26–35)
MCHC RBC AUTO-ENTMCNC: 33.2 % (ref 32–34.5)
MCV RBC AUTO: 96.6 FL (ref 80–99.9)
MONOCYTES ABSOLUTE: 0.41 E9/L (ref 0.1–0.95)
MONOCYTES RELATIVE PERCENT: 8 % (ref 2–12)
NEUTROPHILS ABSOLUTE: 2.55 E9/L (ref 1.8–7.3)
NEUTROPHILS RELATIVE PERCENT: 50 % (ref 43–80)
PDW BLD-RTO: 14.2 FL (ref 11.5–15)
PHOSPHORUS: 3.7 MG/DL (ref 2.5–4.5)
PLATELET # BLD: 357 E9/L (ref 130–450)
PMV BLD AUTO: 9.9 FL (ref 7–12)
POTASSIUM SERPL-SCNC: 4.2 MMOL/L (ref 3.5–5)
RBC # BLD: 3.8 E12/L (ref 3.5–5.5)
SODIUM BLD-SCNC: 140 MMOL/L (ref 132–146)
TOTAL PROTEIN: 7.3 G/DL (ref 6.4–8.3)
TRIGLYCERIDE, FASTING: 93 MG/DL (ref 0–149)
VLDLC SERPL CALC-MCNC: 19 MG/DL
WBC # BLD: 5.1 E9/L (ref 4.5–11.5)

## 2021-10-02 PROCEDURE — 84100 ASSAY OF PHOSPHORUS: CPT

## 2021-10-02 PROCEDURE — 80053 COMPREHEN METABOLIC PANEL: CPT

## 2021-10-02 PROCEDURE — 80061 LIPID PANEL: CPT

## 2021-10-02 PROCEDURE — 85025 COMPLETE CBC W/AUTO DIFF WBC: CPT

## 2021-10-02 PROCEDURE — 36415 COLL VENOUS BLD VENIPUNCTURE: CPT

## 2021-11-01 DIAGNOSIS — E03.9 HYPOTHYROIDISM, UNSPECIFIED TYPE: Chronic | ICD-10-CM

## 2021-11-02 RX ORDER — LEVOTHYROXINE SODIUM 0.07 MG/1
75 TABLET ORAL DAILY
Qty: 30 TABLET | Refills: 1 | Status: SHIPPED
Start: 2021-11-02 | End: 2021-12-28

## 2021-11-05 DIAGNOSIS — I10 ESSENTIAL HYPERTENSION: Chronic | ICD-10-CM

## 2021-11-05 RX ORDER — CHLORTHALIDONE 25 MG/1
25 TABLET ORAL DAILY
Qty: 90 TABLET | Refills: 0 | Status: SHIPPED
Start: 2021-11-05 | End: 2022-02-03

## 2021-11-05 NOTE — TELEPHONE ENCOUNTER
Requested Prescriptions     Pending Prescriptions Disp Refills    chlorthalidone (HYGROTON) 25 MG tablet [Pharmacy Med Name: CHLORTHALIDONE 25MG TABLETS] 90 tablet 0     Sig: TAKE 1 TABLET BY MOUTH DAILY

## 2021-12-08 ENCOUNTER — APPOINTMENT (OUTPATIENT)
Dept: GENERAL RADIOLOGY | Age: 66
End: 2021-12-08
Payer: MEDICARE

## 2021-12-08 LAB
BASOPHILS ABSOLUTE: 0.03 E9/L (ref 0–0.2)
BASOPHILS RELATIVE PERCENT: 0.5 % (ref 0–2)
EOSINOPHILS ABSOLUTE: 0.16 E9/L (ref 0.05–0.5)
EOSINOPHILS RELATIVE PERCENT: 2.7 % (ref 0–6)
HCT VFR BLD CALC: 37.4 % (ref 34–48)
HEMOGLOBIN: 12.4 G/DL (ref 11.5–15.5)
IMMATURE GRANULOCYTES #: 0.01 E9/L
IMMATURE GRANULOCYTES %: 0.2 % (ref 0–5)
LYMPHOCYTES ABSOLUTE: 3.31 E9/L (ref 1.5–4)
LYMPHOCYTES RELATIVE PERCENT: 56.4 % (ref 20–42)
MCH RBC QN AUTO: 32 PG (ref 26–35)
MCHC RBC AUTO-ENTMCNC: 33.2 % (ref 32–34.5)
MCV RBC AUTO: 96.6 FL (ref 80–99.9)
METER GLUCOSE: 110 MG/DL (ref 74–99)
MONOCYTES ABSOLUTE: 0.46 E9/L (ref 0.1–0.95)
MONOCYTES RELATIVE PERCENT: 7.8 % (ref 2–12)
NEUTROPHILS ABSOLUTE: 1.9 E9/L (ref 1.8–7.3)
NEUTROPHILS RELATIVE PERCENT: 32.4 % (ref 43–80)
PDW BLD-RTO: 13.4 FL (ref 11.5–15)
PLATELET # BLD: 367 E9/L (ref 130–450)
PMV BLD AUTO: 9.3 FL (ref 7–12)
RBC # BLD: 3.87 E12/L (ref 3.5–5.5)
WBC # BLD: 5.9 E9/L (ref 4.5–11.5)

## 2021-12-08 PROCEDURE — 84484 ASSAY OF TROPONIN QUANT: CPT

## 2021-12-08 PROCEDURE — 71045 X-RAY EXAM CHEST 1 VIEW: CPT

## 2021-12-08 PROCEDURE — 85025 COMPLETE CBC W/AUTO DIFF WBC: CPT

## 2021-12-08 PROCEDURE — 99283 EMERGENCY DEPT VISIT LOW MDM: CPT

## 2021-12-08 PROCEDURE — 82962 GLUCOSE BLOOD TEST: CPT

## 2021-12-08 PROCEDURE — 83735 ASSAY OF MAGNESIUM: CPT

## 2021-12-08 PROCEDURE — 80053 COMPREHEN METABOLIC PANEL: CPT

## 2021-12-08 PROCEDURE — 93005 ELECTROCARDIOGRAM TRACING: CPT | Performed by: PHYSICIAN ASSISTANT

## 2021-12-09 ENCOUNTER — HOSPITAL ENCOUNTER (EMERGENCY)
Age: 66
Discharge: HOME | End: 2021-12-09
Attending: EMERGENCY MEDICINE
Payer: MEDICARE

## 2021-12-09 VITALS
TEMPERATURE: 98.2 F | OXYGEN SATURATION: 98 % | RESPIRATION RATE: 16 BRPM | HEART RATE: 74 BPM | SYSTOLIC BLOOD PRESSURE: 161 MMHG | DIASTOLIC BLOOD PRESSURE: 90 MMHG

## 2021-12-09 DIAGNOSIS — I15.9 SECONDARY HYPERTENSION: Primary | ICD-10-CM

## 2021-12-09 DIAGNOSIS — E03.1 CONGENITAL HYPOTHYROIDISM WITHOUT GOITER: ICD-10-CM

## 2021-12-09 DIAGNOSIS — R73.9 HYPERGLYCEMIA: ICD-10-CM

## 2021-12-09 LAB
ALBUMIN SERPL-MCNC: 4.9 G/DL (ref 3.5–5.2)
ALP BLD-CCNC: 155 U/L (ref 35–104)
ALT SERPL-CCNC: 13 U/L (ref 0–32)
ANION GAP SERPL CALCULATED.3IONS-SCNC: 12 MMOL/L (ref 7–16)
AST SERPL-CCNC: 22 U/L (ref 0–31)
BILIRUB SERPL-MCNC: 0.4 MG/DL (ref 0–1.2)
BUN BLDV-MCNC: 16 MG/DL (ref 6–23)
CALCIUM SERPL-MCNC: 10.3 MG/DL (ref 8.6–10.2)
CHLORIDE BLD-SCNC: 96 MMOL/L (ref 98–107)
CO2: 27 MMOL/L (ref 22–29)
CREAT SERPL-MCNC: 0.6 MG/DL (ref 0.5–1)
EKG ATRIAL RATE: 88 BPM
EKG P AXIS: 66 DEGREES
EKG P-R INTERVAL: 222 MS
EKG Q-T INTERVAL: 382 MS
EKG QRS DURATION: 78 MS
EKG QTC CALCULATION (BAZETT): 462 MS
EKG R AXIS: 64 DEGREES
EKG T AXIS: 43 DEGREES
EKG VENTRICULAR RATE: 88 BPM
GFR AFRICAN AMERICAN: >60
GFR NON-AFRICAN AMERICAN: >60 ML/MIN/1.73
GLUCOSE BLD-MCNC: 145 MG/DL (ref 74–99)
MAGNESIUM: 1.8 MG/DL (ref 1.6–2.6)
POTASSIUM REFLEX MAGNESIUM: 3.5 MMOL/L (ref 3.5–5)
SODIUM BLD-SCNC: 135 MMOL/L (ref 132–146)
TOTAL PROTEIN: 7.7 G/DL (ref 6.4–8.3)
TROPONIN, HIGH SENSITIVITY: 12 NG/L (ref 0–9)
TROPONIN, HIGH SENSITIVITY: 12 NG/L (ref 0–9)

## 2021-12-09 PROCEDURE — 84484 ASSAY OF TROPONIN QUANT: CPT

## 2021-12-09 PROCEDURE — 93010 ELECTROCARDIOGRAM REPORT: CPT | Performed by: INTERNAL MEDICINE

## 2021-12-09 NOTE — ED NOTES
Pt educated on safe VS and physician follow up. Verbalized understanding with enthusiasm.        Damion Sofia RN  12/09/21 1755 exposed skin. · See a dentist one or two times a year for checkups and to have your teeth cleaned. · Wear a seat belt in the car. · Drink alcohol in moderation, if at all. That means no more than 2 drinks a day for men and 1 drink a day for women. Follow your doctor's advice about when to have certain tests. These tests can spot problems early. For everyone  · Cholesterol. Have the fat (cholesterol) in your blood tested after age 21. Your doctor will tell you how often to have this done based on your age, family history, or other things that can increase your risk for heart disease. · Blood pressure. Have your blood pressure checked during a routine doctor visit. Your doctor will tell you how often to check your blood pressure based on your age, your blood pressure results, and other factors. · Vision. Talk with your doctor about how often to have a glaucoma test.  · Diabetes. Ask your doctor whether you should have tests for diabetes. · Colon cancer. Have a test for colon cancer at age 48. You may have one of several tests. If you are younger than 48, you may need a test earlier if you have any risk factors. Risk factors include whether you already had a precancerous polyp removed from your colon or whether your parent, brother, sister, or child has had colon cancer. For women  · Breast exam and mammogram. Talk to your doctor about when you should have a clinical breast exam and a mammogram. Medical experts differ on whether and how often women under 50 should have these tests. Your doctor can help you decide what is right for you. · Pap test and pelvic exam. Begin Pap tests at age 24. A Pap test is the best way to find cervical cancer. The test often is part of a pelvic exam. Ask how often to have this test.  · Tests for sexually transmitted infections (STIs). Ask whether you should have tests for STIs.  You may be at risk if you have sex with more than one person, especially if your partners do not wear breasts. Some women set a time each month to do a step-by-step breast self-exam. Other women like a less formal system. They might look at their breasts as they brush their teeth, or feel their breasts once in a while in the shower. If you notice a change in your breast, tell your doctor. Follow-up care is a key part of your treatment and safety. Be sure to make and go to all appointments, and call your doctor if you are having problems. It's also a good idea to know your test results and keep a list of the medicines you take. How do you do a breast self-exam?  · The best time to examine your breasts is usually one week after your menstrual period begins. Your breasts should not be tender then. If you do not have periods, you might do your exam on a day of the month that is easy to remember. · To examine your breasts:  ¨ Remove all your clothes above the waist and lie down. When you are lying down, your breast tissue spreads evenly over your chest wall, which makes it easier to feel all your breast tissue. ¨ Use the pads-not the fingertips-of the 3 middle fingers of your left hand to check your right breast. Move your fingers slowly in small coin-sized circles that overlap. ¨ Use three levels of pressure to feel of all your breast tissue. Use light pressure to feel the tissue close to the skin surface. Use medium pressure to feel a little deeper. Use firm pressure to feel your tissue close to your breastbone and ribs. Use each pressure level to feel your breast tissue before moving on to the next spot. ¨ Check your entire breast, moving up and down as if following a strip from the collarbone to the bra line, and from the armpit to the ribs. Repeat until you have covered the entire breast.  ¨ Repeat this procedure for your left breast, using the pads of the 3 middle fingers of your right hand.   · To examine your breasts while in the shower:  ¨ Place one arm over your head and lightly soap your breast on that side.  ¨ Using the pads of your fingers, gently move your hand over your breast (in the strip pattern described above), feeling carefully for any lumps or changes. ¨ Repeat for the other breast.  · Have your doctor inspect anything you notice to see if you need further testing. Where can you learn more? Go to [email or web address]://[email or web address].[email or web address] org and sign in to your [employer] account. Enter P148 in the [employer] box to learn more about \"Breast Self-Exam: Care Instructions. \"     If you do not have an account, please click on the \"Sign Up Now\" link. Current as of: May 12, 2017  Content Version: 11.7  © 6053-9066 [employer]. Care instructions adapted under license by [employer] [facility] ([facility]). If you have questions about a medical condition or this instruction, always ask your healthcare professional. [place] [address] any warranty or liability for your use of this information. Patient Education        Learning About Cervical Cancer Screening  What is a cervical cancer screening test?    Cervical cancer screening tests check for cancer of the cervix. The cervix is the lower part of the uterus that opens into the vagina. The test can help your doctor find early changes in the cells that could lead to cancer. Two tests can be used to screen for cervical cancer. They may be used alone or together. A Pap test.   This test looks for changes in the cells of the cervix. Abnormal cells may be a sign of cancer. A human papillomavirus (HPV) test.   This test looks for the HPV virus. Some types of HPV can cause cancer. During either test, the doctor or nurse will insert a tool called a speculum into your vagina. The speculum gently spreads apart the vaginal walls. It allows your doctor to see inside the vagina and the cervix. He or she uses a small swab or brush to collect cell samples from your cervix. Try to schedule the test when you're not having your period.  To get ready for the test, avoid douches, tampons, vaginal medicines, sprays, and powders for at least a day before you have the test.  When should you have a screening test?  These guidelines apply to women who are at average risk of cervical cancer. If you don't know your risk, talk with your doctor. Women 21 to 34  · You can start having a Pap test at age 24. It is done every 3 years. · You can start having the primary HPV test at age 22. It is done every 3 years. Women 30 to 59  · If you had both a Pap test and an HPV test last time and both were normal, you can have a Pap plus an HPV test every 5 years. · If you had only a Pap test last time, as long as your results are normal, you can have a Pap test every 3 years. · If you had only an HPV test last time, as long as your results are normal, you can have an HPV test every 3 years. Women 72 and older  · If you are age 72 or older, talk with your doctor about what's right for you. Women ages 72 and older may no longer need to be screened for cervical cancer. When to stop having screening tests depends on your medical history, your overall health, and your risk of cervical cell changes or cervical cancer. What happens after the test?  The sample of cells taken during your test will be sent to a lab so that an expert can look at the cells. It usually takes a week or two to get the results back. Pap tests  · A normal result means that the test didn't find any abnormal cells in the sample. · An abnormal result can mean many things. Most of these aren't cancer. The results of your test may be abnormal because:  ¨ You have an infection of the vagina or cervix, such as a yeast infection. ¨ You have an IUD (intrauterine device for birth control). ¨ You have low estrogen levels after menopause that are causing the cells to change. ¨ You have cell changes that may be a sign of precancer or cancer. The results are ranked based on how serious the changes might be.   HPV tests  · A

## 2021-12-09 NOTE — ED NOTES
Department of Emergency Medicine  FIRST PROVIDER TRIAGE NOTE             Independent MLP           12/8/21  11:02 PM EST    Date of Encounter: 12/8/21   MRN: [id number]      HPI: Charol Alpers is a 77 y.o. female who presents to the ED for Hypertension (States she took her blood pressure and it was high today. ) and Hyperglycemia (\"My sugar 165, it ain't ever been that high before. \")    palpitations lightheaded  No cp or headache     ROS: Negative for cp, sob, or headache. PE: CV: regular rate  Pulm: CTA bilat     Initial Plan of Care: All treatment areas with department are currently occupied. Plan to order/Initiate the following while awaiting opening in ED: labs, EKG, and CXR.     Initial Plan of Care: Initiate Treatment-Testing, Proceed toTreatment Area When Bed Available for ED Attending/MLP to Continue Care    Electronically signed by GABRIELLA Meek   DD: 12/8/21       GABRIELLA Meek  12/08/21 [id number]  ATTENDING PROVIDER ATTESTATION:     Supervising Physician, on-site, available for consultation, non-participatory in the evaluation or care of this patient       Aishwarya Rodriguez MD  12/08/21 [id number]

## 2021-12-09 NOTE — ED PROVIDER NOTES
Department of Emergency Medicine   ED  Provider Note  Admit Date/RoomTime: 12/9/2021 11:18 AM  ED Room: [facility]          History of Present Illness:  12/9/21, Time: 11:30 AM EST  Chief Complaint   Patient presents with    Hypertension     States she took her blood pressure and it was high today.  Hyperglycemia     \"My sugar 165, it ain't ever been that high before. \"                Taco Baker is a 77 y.o. female presenting to the ED for hyperglycemia. Came on suddenly, nothing makes it better or worse, no associated pain. Patient blood sugar was 165 at home. She says never been that high. She is not a diabetic. Their monitor. She is not sure if she ate prior to checking her blood sugar. She also complains that she is hypertensive. She does have a history hypertension, she is compliant her medications. Said her pressure was 160 at home. This is high for her. Denies any fever, chills, nausea, vomiting, change in bowel bladder, tenderness, paresthesias, lethargy, cough, sputum, or any other symptoms or complaints. Review of Systems:   Pertinent positives and negatives are stated within HPI, all other systems reviewed and are negative.        --------------------------------------------- PAST HISTORY ---------------------------------------------  Past Medical History:  has a past medical history of Bronchitis, Hyperlipidemia, Hypertension, and Thyroid disease. Past Surgical History:  has a past surgical history that includes Colonoscopy (1/21/2015) and Upper gastrointestinal endoscopy (1/21/2015). Social History:  reports that she has never smoked. She has never used smokeless tobacco. She reports that she does not drink alcohol and does not use drugs. Family History: family history is not on file. . Unless otherwise noted, family history is non contributory    The patients home medications have been reviewed.     Allergies: Basophils % 0.5 0.0 - 2.0 %    Neutrophils Absolute 1.90 1.80 - 7.30 E9/L    Immature Granulocytes # 0.01 E9/L    Lymphocytes Absolute 3.31 1.50 - 4.00 E9/L    Monocytes Absolute 0.46 0.10 - 0.95 E9/L    Eosinophils Absolute 0.16 0.05 - 0.50 E9/L    Basophils Absolute 0.03 0.00 - 0.20 E9/L   Comprehensive Metabolic Panel w/ Reflex to MG   Result Value Ref Range    Sodium 135 132 - 146 mmol/L    Potassium reflex Magnesium 3.5 3.5 - 5.0 mmol/L    Chloride 96 (L) 98 - 107 mmol/L    CO2 27 22 - 29 mmol/L    Anion Gap 12 7 - 16 mmol/L    Glucose 145 (H) 74 - 99 mg/dL    BUN 16 6 - 23 mg/dL    CREATININE 0.6 0.5 - 1.0 mg/dL    GFR Non-African American >60 >=60 mL/min/1.73    GFR African American >60     Calcium 10.3 (H) 8.6 - 10.2 mg/dL    Total Protein 7.7 6.4 - 8.3 g/dL    Albumin 4.9 3.5 - 5.2 g/dL    Total Bilirubin 0.4 0.0 - 1.2 mg/dL    Alkaline Phosphatase 155 (H) 35 - 104 U/L    ALT 13 0 - 32 U/L    AST 22 0 - 31 U/L   Troponin   Result Value Ref Range    Troponin, High Sensitivity 12 (H) 0 - 9 ng/L   Magnesium   Result Value Ref Range    Magnesium 1.8 1.6 - 2.6 mg/dL   Troponin   Result Value Ref Range    Troponin, High Sensitivity 12 (H) 0 - 9 ng/L   POCT Glucose   Result Value Ref Range    Meter Glucose 110 (H) 74 - 99 mg/dL   EKG 12 Lead   Result Value Ref Range    Ventricular Rate 88 BPM    Atrial Rate 88 BPM    P-R Interval 222 ms    QRS Duration 78 ms    Q-T Interval 382 ms    QTc Calculation (Bazett) 462 ms    P Axis 66 degrees    R Axis 64 degrees    T Axis 43 degrees   ,       RADIOLOGY:  Interpreted by Radiologist unless otherwise specified  XR CHEST PORTABLE   Final Result   No acute abnormality identified.                EKG Interpretation  Interpreted by emergency department physician, Dr. Michi Perdomo, rate 88, no STEMI        ------------------------- NURSING NOTES AND VITALS REVIEWED ---------------------------   The nursing notes within the ED encounter and vital signs as below have been reviewed by myself  BP (!) 152/76   Pulse 72   Temp 98.2 °F (36.8 °C) (Oral)   Resp 16   SpO2 100%     Oxygen Saturation Interpretation: Normal    The patients available past medical records and past encounters were reviewed. ------------------------------ ED COURSE/MEDICAL DECISION MAKING----------------------  Medications - No data to display          Medical Decision Making:    Patient had no symptoms or complaints on arrival.  Labs and imaging reviewed. Repeat troponin reassuring. Blood sugar stable. Blood pressure stable as well. Given her lack of symptoms, the fact that she is hemodynamically stable otherwise, and has no signs of DKA, do not feel further emergent evaluation was indicated. Patient is to continue to monitor blood pressure at home, she is to follow-up with her PCP in 1 to 2 days regarding this. She voices understanding. She is also to follow-up regarding her blood sugar. She is educated on further signs and symptoms that require emergent evaluation. Counseling: The emergency provider has spoken with the patient and discussed todays results, in addition to providing specific details for the plan of care and counseling regarding the diagnosis and prognosis. Questions are answered at this time and they are agreeable with the plan.       --------------------------------- IMPRESSION AND DISPOSITION ---------------------------------    IMPRESSION  1. Secondary hypertension    2. Hyperglycemia        DISPOSITION  Disposition: Discharge to home  Patient condition is stable        NOTE: This report was transcribed using voice recognition software.  Every effort was made to ensure accuracy; however, inadvertent computerized transcription errors may be present        Cheri Michel MD  12/09/21 [id number]

## 2021-12-17 ENCOUNTER — HOSPITAL ENCOUNTER (OUTPATIENT)
Age: 66
Discharge: HOME | End: 2021-12-17
Payer: MEDICARE

## 2021-12-17 LAB
HBA1C MFR BLD: 6 % (ref 4–5.6)
T4 FREE: 0.8 NG/DL (ref 0.93–1.7)
TSH SERPL DL<=0.05 MIU/L-ACNC: 6 UIU/ML (ref 0.27–4.2)

## 2021-12-17 PROCEDURE — 84443 ASSAY THYROID STIM HORMONE: CPT

## 2021-12-17 PROCEDURE — 36415 COLL VENOUS BLD VENIPUNCTURE: CPT

## 2021-12-17 PROCEDURE — 84439 ASSAY OF FREE THYROXINE: CPT

## 2021-12-17 PROCEDURE — 83036 HEMOGLOBIN GLYCOSYLATED A1C: CPT

## 2021-12-28 ENCOUNTER — OFFICE VISIT (OUTPATIENT)
Dept: FAMILY MEDICINE CLINIC | Age: 66
End: 2021-12-28
Payer: MEDICARE

## 2021-12-28 VITALS
TEMPERATURE: 96.8 F | OXYGEN SATURATION: 97 % | HEIGHT: 67 IN | BODY MASS INDEX: 31.08 KG/M2 | WEIGHT: 198 LBS | SYSTOLIC BLOOD PRESSURE: 133 MMHG | DIASTOLIC BLOOD PRESSURE: 74 MMHG | HEART RATE: 77 BPM | RESPIRATION RATE: 16 BRPM

## 2021-12-28 DIAGNOSIS — H91.93 BILATERAL HEARING LOSS, UNSPECIFIED HEARING LOSS TYPE: Primary | ICD-10-CM

## 2021-12-28 DIAGNOSIS — E03.9 HYPOTHYROIDISM, UNSPECIFIED TYPE: Chronic | ICD-10-CM

## 2021-12-28 DIAGNOSIS — H61.22 IMPACTED CERUMEN OF LEFT EAR: ICD-10-CM

## 2021-12-28 PROCEDURE — 1036F TOBACCO NON-USER: CPT | Performed by: FAMILY MEDICINE

## 2021-12-28 PROCEDURE — 4040F PNEUMOC VAC/ADMIN/RCVD: CPT | Performed by: FAMILY MEDICINE

## 2021-12-28 PROCEDURE — G8484 FLU IMMUNIZE NO ADMIN: HCPCS | Performed by: FAMILY MEDICINE

## 2021-12-28 PROCEDURE — G8399 PT W/DXA RESULTS DOCUMENT: HCPCS | Performed by: FAMILY MEDICINE

## 2021-12-28 PROCEDURE — 3017F COLORECTAL CA SCREEN DOC REV: CPT | Performed by: FAMILY MEDICINE

## 2021-12-28 PROCEDURE — 1123F ACP DISCUSS/DSCN MKR DOCD: CPT | Performed by: FAMILY MEDICINE

## 2021-12-28 PROCEDURE — 1090F PRES/ABSN URINE INCON ASSESS: CPT | Performed by: FAMILY MEDICINE

## 2021-12-28 PROCEDURE — G8417 CALC BMI ABV UP PARAM F/U: HCPCS | Performed by: FAMILY MEDICINE

## 2021-12-28 PROCEDURE — 99213 OFFICE O/P EST LOW 20 MIN: CPT | Performed by: FAMILY MEDICINE

## 2021-12-28 PROCEDURE — G8427 DOCREV CUR MEDS BY ELIG CLIN: HCPCS | Performed by: FAMILY MEDICINE

## 2021-12-28 RX ORDER — METOPROLOL SUCCINATE 25 MG/1
25 TABLET, EXTENDED RELEASE ORAL DAILY
Qty: 30 TABLET | Refills: 5 | Status: SHIPPED
Start: 2021-12-28 | End: 2022-03-31

## 2021-12-28 RX ORDER — LEVOTHYROXINE SODIUM 25 MCG
25 TABLET ORAL DAILY
Qty: 30 TABLET | Refills: 2 | Status: SHIPPED
Start: 2021-12-28 | End: 2022-08-04 | Stop reason: SDUPTHER

## 2021-12-28 NOTE — PROGRESS NOTES
12/28/2021    Isidra Salas is a 74 y.o. female here for   Chief Complaint   Patient presents with    Diabetes     Regarding diabetes, this is a chronic problem under  excellent control. Known diabetic complications include none. Current diabetic medications include none. Patient complains of no polyuria or polydipsia, no chest pain, dyspnea or TIA's, no numbness, tingling or pain in extremities, no unusual visual symptoms. Lab Results   Component Value Date    LABA1C 6.0 12/17/2021    LABA1C 6.2 06/04/2021    LABA1C 6.5 06/19/2020     Microalbumin:   Lab Results   Component Value Date    LABMICR 14.2 06/19/2020     Regarding hypothyroidism, this problem is chronic. This is under poor control on current medication regimen. She had palpitations while on levothyroxine 75 , so she stopped taking it. She though a natural product would be better. She was prescribed armour thyroid but has been reluctant to take it. Current symptoms include fatigue and 'brain fog'. she denies constipation, anxiousness, feeling excessive energy and is not taking her medication consistently on an empty stomach. Most recent labs reviewed with patient. TSH:    Lab Results   Component Value Date    TSH 6.000 12/17/2021        Wt Readings from Last 3 Encounters:   12/28/21 198 lb (89.8 kg)   09/14/21 196 lb (88.9 kg)   07/09/21 203 lb 3.2 oz (92.2 kg)       She  reports that she has never smoked. She has never used smokeless tobacco.    Medications and allergies reviewed and updated in chart.     Current Outpatient Medications   Medication Sig Dispense Refill    chlorthalidone (HYGROTON) 25 MG tablet TAKE 1 TABLET BY MOUTH DAILY 90 tablet 0    levothyroxine (SYNTHROID) 75 MCG tablet TAKE 1 TABLET BY MOUTH DAILY 30 tablet 1    levocetirizine (XYZAL) 5 MG tablet TAKE 1 TABLET BY MOUTH EVERY NIGHT AT BEDTIME 90 tablet 3    cyclobenzaprine (FLEXERIL) 5 MG tablet TAKE 1 TABLET BY MOUTH EVERY NIGHT AS NEEDED FOR MUSCLE SPASMS      fluticasone (FLONASE) 50 MCG/ACT nasal spray SHAKE LIQUID AND USE 1 SPRAY IN EACH NOSTRIL EVERY DAY AS NEEDED 16 g 5    NEXLETOL 180 MG TABS       ondansetron (ZOFRAN) 4 MG tablet Take 1 tablet by mouth 3 times daily as needed for Nausea or Vomiting 15 tablet 0    tiotropium (SPIRIVA RESPIMAT) 1.25 MCG/ACT AERS inhaler Inhale 2 puffs into the lungs daily 1 Inhaler 5    blood glucose monitor strips Test 1 times a day & as needed for symptoms of irregular blood glucose. (pt has one touch verio Flex at home) 100 strip 5    Alcohol Swabs (B-D SINGLE USE SWABS REGULAR) PADS USE AS DIRECTED 100 each 11    OneTouch Delica Lancets 50O MISC TEST EVERY DAY AS DIRECTED 100 each 5    polyethylene glycol (GLYCOLAX) 17 GM/SCOOP powder MIX 1 CAPFUL(17GM) IN A BEVERAGE AND DRINK EVERY  g 11    Handicap Placard MISC by Does not apply route Patient cannot walk 200 ft without stopping to rest.    Expiration 1/2023 1 each 0    ammonium lactate (AMLACTIN) 12 % lotion APPLY ONCE DAILY 225 g 5    Cholecalciferol (VITAMIN D3) 2000 units CAPS TK 1 C PO QD  5    ketotifen (ZADITOR) 0.025 % ophthalmic solution INT 1 GTT IN OU BID PRF ALLERGIES  5    aliskiren (TEKTURNA) 300 MG tablet Take 300 mg by mouth daily.  metoprolol succinate (TOPROL XL) 25 MG extended release tablet Take 1 tablet by mouth daily 30 tablet 5    ibuprofen (ADVIL;MOTRIN) 800 MG tablet Take 1 tablet by mouth every 8 hours as needed for Pain 30 tablet 1     No current facility-administered medications for this visit. Patient'spast medical, surgical, social and/or family history reviewed, updated in chart, and are non-contributory (unless otherwise stated). Review of Systems  Review of Systems   Constitutional: Negative for chills and fever. Respiratory: Negative for cough and wheezing. Cardiovascular: Negative for chest pain and leg swelling. Neurological: Negative for dizziness and headaches.        PE:  VS:  /74   Pulse 77 Temp 96.8 °F (36 °C) (Temporal)   Resp 16   Ht 5' 7\" (1.702 m)   Wt 198 lb (89.8 kg)   SpO2 97%   BMI 31.01 kg/m²   Physical Exam  Constitutional:       Appearance: She is well-developed. HENT:      Head: Normocephalic and atraumatic. Ears:      Comments: Left EAC occluded by cerumen  Cardiovascular:      Rate and Rhythm: Normal rate and regular rhythm. Heart sounds: No murmur heard. No friction rub. No gallop. Pulmonary:      Effort: Pulmonary effort is normal.      Breath sounds: Normal breath sounds. No wheezing or rales. Skin:     General: Skin is warm and dry. Neurological:      Mental Status: She is alert and oriented to person, place, and time. Procedure note:  Cerumen impaction(s) removed in office today using lighted loop curette. Patient tolerated procedure well. Assessment/Plan:  Jaida Freed was seen today for diabetes. Diagnoses and all orders for this visit:    Bilateral hearing loss, unspecified hearing loss type  Cerumen disimpaction and hearing improved but not all the way  Refer for formal hearing test  -     [facility]    Hypothyroidism, unspecified type  patient has not been taking medication  She is symptomatic  -     SYNTHROID 25 MCG tablet; Take 1 tablet by mouth Daily    Impacted cerumen of left ear    refilled  -     metoprolol succinate (TOPROL XL) 25 MG extended release tablet; Take 1 tablet by mouth daily        F/u in 3 months  Advised patient to call with any new medication issues. All questions answered.   Call or go to emergency department ifsymptoms worsen or persist.

## 2021-12-29 ASSESSMENT — ENCOUNTER SYMPTOMS
COUGH: 0
WHEEZING: 0

## 2022-01-10 ENCOUNTER — TELEPHONE (OUTPATIENT)
Dept: AUDIOLOGY | Age: 67
End: 2022-01-10

## 2022-01-11 ENCOUNTER — OFFICE VISIT (OUTPATIENT)
Dept: FAMILY MEDICINE CLINIC | Age: 67
End: 2022-01-11
Payer: MEDICARE

## 2022-01-11 VITALS
BODY MASS INDEX: 31.55 KG/M2 | HEART RATE: 79 BPM | WEIGHT: 201 LBS | OXYGEN SATURATION: 99 % | RESPIRATION RATE: 16 BRPM | HEIGHT: 67 IN | TEMPERATURE: 97 F | SYSTOLIC BLOOD PRESSURE: 150 MMHG | DIASTOLIC BLOOD PRESSURE: 90 MMHG

## 2022-01-11 DIAGNOSIS — S46.812A STRAIN OF LEFT SUBSCAPULARIS MUSCLE, INITIAL ENCOUNTER: Primary | ICD-10-CM

## 2022-01-11 DIAGNOSIS — M17.0 PRIMARY OSTEOARTHRITIS OF BOTH KNEES: ICD-10-CM

## 2022-01-11 PROCEDURE — 4040F PNEUMOC VAC/ADMIN/RCVD: CPT | Performed by: STUDENT IN AN ORGANIZED HEALTH CARE EDUCATION/TRAINING PROGRAM

## 2022-01-11 PROCEDURE — 99212 OFFICE O/P EST SF 10 MIN: CPT | Performed by: STUDENT IN AN ORGANIZED HEALTH CARE EDUCATION/TRAINING PROGRAM

## 2022-01-11 PROCEDURE — 3017F COLORECTAL CA SCREEN DOC REV: CPT | Performed by: STUDENT IN AN ORGANIZED HEALTH CARE EDUCATION/TRAINING PROGRAM

## 2022-01-11 PROCEDURE — G8484 FLU IMMUNIZE NO ADMIN: HCPCS | Performed by: STUDENT IN AN ORGANIZED HEALTH CARE EDUCATION/TRAINING PROGRAM

## 2022-01-11 PROCEDURE — G8399 PT W/DXA RESULTS DOCUMENT: HCPCS | Performed by: STUDENT IN AN ORGANIZED HEALTH CARE EDUCATION/TRAINING PROGRAM

## 2022-01-11 PROCEDURE — 1090F PRES/ABSN URINE INCON ASSESS: CPT | Performed by: STUDENT IN AN ORGANIZED HEALTH CARE EDUCATION/TRAINING PROGRAM

## 2022-01-11 PROCEDURE — 1036F TOBACCO NON-USER: CPT | Performed by: STUDENT IN AN ORGANIZED HEALTH CARE EDUCATION/TRAINING PROGRAM

## 2022-01-11 PROCEDURE — G8417 CALC BMI ABV UP PARAM F/U: HCPCS | Performed by: STUDENT IN AN ORGANIZED HEALTH CARE EDUCATION/TRAINING PROGRAM

## 2022-01-11 PROCEDURE — 1123F ACP DISCUSS/DSCN MKR DOCD: CPT | Performed by: STUDENT IN AN ORGANIZED HEALTH CARE EDUCATION/TRAINING PROGRAM

## 2022-01-11 PROCEDURE — G8427 DOCREV CUR MEDS BY ELIG CLIN: HCPCS | Performed by: STUDENT IN AN ORGANIZED HEALTH CARE EDUCATION/TRAINING PROGRAM

## 2022-01-11 RX ORDER — METHOCARBAMOL 500 MG/1
500 TABLET, FILM COATED ORAL 4 TIMES DAILY
Qty: 40 TABLET | Refills: 0 | Status: SHIPPED | OUTPATIENT
Start: 2022-01-11 | End: 2022-01-21

## 2022-01-11 RX ORDER — IBUPROFEN 800 MG/1
800 TABLET ORAL EVERY 8 HOURS PRN
Qty: 30 TABLET | Refills: 1 | Status: SHIPPED
Start: 2022-01-11 | End: 2022-01-27

## 2022-01-11 NOTE — PROGRESS NOTES
Chief Complaint       Shoulder Pain (Left side shoulder blade pain for the past 3 days. It comes and goes. )      History of Present Illness   Source of history provided by:  patient. Cheikh Dolan is a 74 y.o. female who  has a past medical history of Bronchitis, Hyperlipidemia, Hypertension, and Thyroid disease. presenting to the walk in clinic for evaluation of left shoulder pain that began 3 days ago after she quickly turned to prevent a fall. Since she has had intermittent, sharp pains in her left shoulder blade that lasts for a few seconds. No associated symptoms and nothing brings symptoms on. Has been taking Tylenol with some relief. ROS    Unless otherwise stated in this report or unable to obtain because of the patient's clinical or mental status as evidenced by the medical record, this patients's positive and negative responses for Review of Systems, constitutional, psych, eyes, ENT, cardiovascular, respiratory, gastrointestinal, neurological, genitourinary, musculoskeletal, integument systems and systems related to the presenting problem are either stated in the preceding or were not pertinent or were negative for the symptoms and/or complaints related to the medical problem. Past Medical History:  has a past medical history of Bronchitis, Hyperlipidemia, Hypertension, and Thyroid disease. Past Surgical History:  has a past surgical history that includes Colonoscopy (1/21/2015) and Upper gastrointestinal endoscopy (1/21/2015). Social History:  reports that she has never smoked. She has never used smokeless tobacco. She reports that she does not drink alcohol and does not use drugs. Family History: family history is not on file.    Allergies: Penicillins    Physical Exam         VS:  BP (!) 150/90   Pulse 79   Temp 97 °F (36.1 °C)   Resp 16   Ht 5' 7\" (1.702 m)   Wt 201 lb (91.2 kg)   SpO2 99%   BMI 31.48 kg/m²    Oxygen Saturation Interpretation: Normal.  Constitutional:  Alert, development consistent with age. Neck:  Normal ROM. Supple. Lungs:  Clear to auscultation and breath sounds equal.  Heart:  Regular rate and rhythm, normal heart sounds, without pathological murmurs, ectopy, gallops, or rubs. Abdomen:  Soft, nontender, good bowel sounds. No firm or pulsatile mass. MSK: TTP of L scapula; ROM intact, no obvious deformity   Back:  No costovertebral tenderness. Skin:  Normal turgor. Warm, dry, without visible rash, unless noted elsewhere. Neurological:  Alert and oriented. Motor functions intact. Lab / Imaging Results   (All laboratory and radiology results have been personally reviewed by myself)  Labs:  No results found for this visit on 01/11/22. Imaging: All Radiology results interpreted by Radiologist unless otherwise noted. Assessment / Plan     Impression(s):  Delinda Epley was seen today for shoulder pain. Diagnoses and all orders for this visit:    Strain of left subscapularis muscle, initial encounter  -     methocarbamol (ROBAXIN) 500 MG tablet; Take 1 tablet by mouth 4 times daily for 10 days    Primary osteoarthritis of both knees  -     ibuprofen (ADVIL;MOTRIN) 800 MG tablet; Take 1 tablet by mouth every 8 hours as needed for Pain    Likely muscle strain; advised heat, NSAIDs, Tylenol and will prescribe muscle relaxant  Disposition:  Disposition: Discharge to home. Pt advised to f/u with PCP in 5-7 days for continued management and further care. ER immediately if symptoms worsen or change. Red flag symptoms discussed. Patient states understanding is in agreement with this care plan. All questions answered.     Joann Vilchis MD

## 2022-01-19 ENCOUNTER — TELEPHONE (OUTPATIENT)
Dept: AUDIOLOGY | Age: 67
End: 2022-01-19

## 2022-01-19 NOTE — TELEPHONE ENCOUNTER
Patient missed appointment for hearing evaluation. Called to reschedule.  Appointment scheduled for 2/1/22 at 11:00 am.     Electronically signed by Stefanie Arias on 1/19/2022 at 11:02 AM

## 2022-01-27 DIAGNOSIS — M17.0 PRIMARY OSTEOARTHRITIS OF BOTH KNEES: ICD-10-CM

## 2022-01-27 RX ORDER — IBUPROFEN 800 MG/1
800 TABLET ORAL EVERY 8 HOURS PRN
Qty: 30 TABLET | Refills: 1 | Status: SHIPPED
Start: 2022-01-27 | End: 2022-02-22

## 2022-02-01 ENCOUNTER — HOSPITAL ENCOUNTER (OUTPATIENT)
Dept: AUDIOLOGY | Age: 67
Discharge: HOME | End: 2022-02-01
Payer: MEDICARE

## 2022-02-01 PROCEDURE — 92567 TYMPANOMETRY: CPT | Performed by: AUDIOLOGIST

## 2022-02-01 PROCEDURE — 92557 COMPREHENSIVE HEARING TEST: CPT | Performed by: AUDIOLOGIST

## 2022-02-01 NOTE — PROGRESS NOTES
AUDIOMETRIC EVALUATION    REASON FOR REFERRAL:  Josefina Vázquez was referred for audiometric testing by Sangeeta Song MD due to patient reporting hearing loss in both ears. Patient reported that both of her ears have felt stuffy for about a month, and she has noticed a decrease in hearing in both ears for about a month as well. Patient denied any tinnitus, dizziness, ear pain, ear drainage and loud noise exposure. RESULTS:  Pure tone audiometric testing using insert earphones  was carried out. Results revealed thresholds of 10dBHL at 250 Hz, sloping to 25 dBHL through 2000 Hz, rising to 5 dBHL at 4000 Hz, sloping to 35 dBHL at 8000 Hz in the right ear, and thresholds of 10 dBHL through 3000 Hz, sloping to 20 dBHL at 6000 Hz, and 55 dBHL at 8000 Hz in the left ear. Speech reception thresholds were obtained at 10 dBHL in the right ear, and 5 dBHL in the left ear. Speech discrimination testing was performed at 60 dBHL in the right ear, and 55 dBHL in the left ear Obtained were scores of 100 % bilaterally. Masked Bone Conduction Testing revealed air-bone gaps of 15 dB at 500 Hz, and 25 dB at 1000 Hz Hz, in the right ear, and 15 dB at 4000 Hz in the left ear. Tympanometry revealed normal middle ear peak pressure and compliance, bilaterally. IMPRESSION:  Today's results revealed borderline normal hearing sensitivity through 6000 Hz with mild unspecified hearing loss at 8000 Hz in the right ear (note the presence of air-bone gaps at 500 and 1000 Hz) and hearing within normal limits through 6000 Hz with moderate unspecified hearing loss in the left ear (note the presence of an air-bone gap at 4000 Hz). Speech reception thresholds were in fair agreement with the pure tone averages, in the right ear, and in good agreement in the left ear. Speech discrimination scores were excellent, bilaterally. Tympanometry suggests middle ear function within normal limits, bilaterally.        An ENT consult is suggested if you feel it is clinically warranted due to patient report of stuffy ears and presence of air-bone gaps. A re-evaluation is recommended annually. The above results were reviewed with the patient. If I can be of further assistance or provide additional information, please do not hesitate to contact this office.     Thank you for the referral.      KRYSTAL Sheehan Doctor of Audiology Intern    Thiago Boland, [address] [place] [address] [facility], [address] [facility]    Electronically signed by Stefanie Cali on 2/1/2022 at 11:36 AM Benzoyl Peroxide Counseling: Patient counseled that medicine may cause skin irritation and bleach clothing.  In the event of skin irritation, the patient was advised to reduce the amount of the drug applied or use it less frequently.   The patient verbalized understanding of the proper use and possible adverse effects of benzoyl peroxide.  All of the patient's questions and concerns were addressed.

## 2022-02-03 DIAGNOSIS — I10 ESSENTIAL HYPERTENSION: Chronic | ICD-10-CM

## 2022-02-03 RX ORDER — CHLORTHALIDONE 25 MG/1
25 TABLET ORAL DAILY
Qty: 90 TABLET | Refills: 0 | Status: SHIPPED
Start: 2022-02-03 | End: 2022-05-04

## 2022-02-10 ENCOUNTER — TELEPHONE (OUTPATIENT)
Dept: FAMILY MEDICINE CLINIC | Age: 67
End: 2022-02-10

## 2022-02-10 NOTE — TELEPHONE ENCOUNTER
Called patient to remind to get mammogram done.  Offered to schedule for the patient; patient stated she will call Matias Manuel to schedule prior to her next appt on 3-31-22

## 2022-02-19 DIAGNOSIS — M17.0 PRIMARY OSTEOARTHRITIS OF BOTH KNEES: ICD-10-CM

## 2022-02-22 RX ORDER — IBUPROFEN 800 MG/1
800 TABLET ORAL EVERY 8 HOURS PRN
Qty: 30 TABLET | Refills: 1 | Status: SHIPPED
Start: 2022-02-22 | End: 2022-03-10

## 2022-03-05 ENCOUNTER — HOSPITAL ENCOUNTER (OUTPATIENT)
Age: 67
Discharge: HOME | End: 2022-03-05
Payer: MEDICARE

## 2022-03-05 LAB
ALBUMIN SERPL-MCNC: 4.5 G/DL (ref 3.5–5.2)
ALP BLD-CCNC: 143 U/L (ref 35–104)
ALT SERPL-CCNC: 13 U/L (ref 0–32)
ANION GAP SERPL CALCULATED.3IONS-SCNC: 13 MMOL/L (ref 7–16)
AST SERPL-CCNC: 20 U/L (ref 0–31)
BILIRUB SERPL-MCNC: 0.4 MG/DL (ref 0–1.2)
BUN BLDV-MCNC: 23 MG/DL (ref 6–23)
CALCIUM SERPL-MCNC: 10.5 MG/DL (ref 8.6–10.2)
CHLORIDE BLD-SCNC: 100 MMOL/L (ref 98–107)
CHOLESTEROL, TOTAL: 395 MG/DL (ref 0–199)
CO2: 27 MMOL/L (ref 22–29)
CREAT SERPL-MCNC: 0.8 MG/DL (ref 0.5–1)
CREATININE URINE: 115 MG/DL (ref 29–226)
GFR AFRICAN AMERICAN: >60
GFR NON-AFRICAN AMERICAN: >60 ML/MIN/1.73
GLUCOSE BLD-MCNC: 122 MG/DL (ref 74–99)
HBA1C MFR BLD: 6.4 % (ref 4–5.6)
HDLC SERPL-MCNC: 81 MG/DL
LDL CHOLESTEROL CALCULATED: 294 MG/DL (ref 0–99)
MICROALBUMIN UR-MCNC: <12 MG/L
MICROALBUMIN/CREAT UR-RTO: ABNORMAL (ref 0–30)
POTASSIUM SERPL-SCNC: 4.2 MMOL/L (ref 3.5–5)
SODIUM BLD-SCNC: 140 MMOL/L (ref 132–146)
TOTAL PROTEIN: 7.6 G/DL (ref 6.4–8.3)
TRIGL SERPL-MCNC: 101 MG/DL (ref 0–149)
TSH SERPL DL<=0.05 MIU/L-ACNC: 6.92 UIU/ML (ref 0.27–4.2)
VLDLC SERPL CALC-MCNC: 20 MG/DL

## 2022-03-05 PROCEDURE — 82044 UR ALBUMIN SEMIQUANTITATIVE: CPT

## 2022-03-05 PROCEDURE — 82570 ASSAY OF URINE CREATININE: CPT

## 2022-03-05 PROCEDURE — 36415 COLL VENOUS BLD VENIPUNCTURE: CPT

## 2022-03-05 PROCEDURE — 84443 ASSAY THYROID STIM HORMONE: CPT

## 2022-03-05 PROCEDURE — 84439 ASSAY OF FREE THYROXINE: CPT

## 2022-03-05 PROCEDURE — 83036 HEMOGLOBIN GLYCOSYLATED A1C: CPT

## 2022-03-05 PROCEDURE — 80061 LIPID PANEL: CPT

## 2022-03-05 PROCEDURE — 80053 COMPREHEN METABOLIC PANEL: CPT

## 2022-03-06 LAB — T4 FREE: 0.94 NG/DL (ref 0.93–1.7)

## 2022-03-10 DIAGNOSIS — M17.0 PRIMARY OSTEOARTHRITIS OF BOTH KNEES: ICD-10-CM

## 2022-03-10 RX ORDER — IBUPROFEN 800 MG/1
800 TABLET ORAL EVERY 8 HOURS PRN
Qty: 30 TABLET | Refills: 1 | Status: SHIPPED
Start: 2022-03-10 | End: 2022-10-25

## 2022-03-15 RX ORDER — LEVOCETIRIZINE DIHYDROCHLORIDE 5 MG/1
TABLET, FILM COATED ORAL
Qty: 90 TABLET | Refills: 3 | Status: SHIPPED | OUTPATIENT
Start: 2022-03-15

## 2022-03-31 ENCOUNTER — OFFICE VISIT (OUTPATIENT)
Dept: FAMILY MEDICINE CLINIC | Age: 67
End: 2022-03-31
Payer: MEDICARE

## 2022-03-31 VITALS
BODY MASS INDEX: 31.39 KG/M2 | HEIGHT: 67 IN | TEMPERATURE: 97.4 F | WEIGHT: 200 LBS | HEART RATE: 66 BPM | OXYGEN SATURATION: 100 % | DIASTOLIC BLOOD PRESSURE: 67 MMHG | SYSTOLIC BLOOD PRESSURE: 132 MMHG

## 2022-03-31 DIAGNOSIS — E11.9 CONTROLLED TYPE 2 DIABETES MELLITUS WITHOUT COMPLICATION, WITHOUT LONG-TERM CURRENT USE OF INSULIN (HCC): ICD-10-CM

## 2022-03-31 DIAGNOSIS — Z91.81 AT HIGH RISK FOR FALLS: Primary | ICD-10-CM

## 2022-03-31 DIAGNOSIS — E78.2 MIXED HYPERLIPIDEMIA: ICD-10-CM

## 2022-03-31 PROCEDURE — 3017F COLORECTAL CA SCREEN DOC REV: CPT | Performed by: FAMILY MEDICINE

## 2022-03-31 PROCEDURE — G8427 DOCREV CUR MEDS BY ELIG CLIN: HCPCS | Performed by: FAMILY MEDICINE

## 2022-03-31 PROCEDURE — 2022F DILAT RTA XM EVC RTNOPTHY: CPT | Performed by: FAMILY MEDICINE

## 2022-03-31 PROCEDURE — 99213 OFFICE O/P EST LOW 20 MIN: CPT | Performed by: FAMILY MEDICINE

## 2022-03-31 PROCEDURE — G8417 CALC BMI ABV UP PARAM F/U: HCPCS | Performed by: FAMILY MEDICINE

## 2022-03-31 PROCEDURE — 3044F HG A1C LEVEL LT 7.0%: CPT | Performed by: FAMILY MEDICINE

## 2022-03-31 PROCEDURE — G8484 FLU IMMUNIZE NO ADMIN: HCPCS | Performed by: FAMILY MEDICINE

## 2022-03-31 PROCEDURE — 1123F ACP DISCUSS/DSCN MKR DOCD: CPT | Performed by: FAMILY MEDICINE

## 2022-03-31 PROCEDURE — 1090F PRES/ABSN URINE INCON ASSESS: CPT | Performed by: FAMILY MEDICINE

## 2022-03-31 PROCEDURE — G8399 PT W/DXA RESULTS DOCUMENT: HCPCS | Performed by: FAMILY MEDICINE

## 2022-03-31 PROCEDURE — 4040F PNEUMOC VAC/ADMIN/RCVD: CPT | Performed by: FAMILY MEDICINE

## 2022-03-31 PROCEDURE — 1036F TOBACCO NON-USER: CPT | Performed by: FAMILY MEDICINE

## 2022-03-31 RX ORDER — AMMONIUM LACTATE 12 G/100G
LOTION TOPICAL
Qty: 225 G | Refills: 5 | Status: SHIPPED
Start: 2022-03-31 | End: 2022-03-31 | Stop reason: SINTOL

## 2022-03-31 RX ORDER — EVOLOCUMAB 140 MG/ML
INJECTION, SOLUTION SUBCUTANEOUS
COMMUNITY
Start: 2022-03-16

## 2022-03-31 NOTE — PROGRESS NOTES
3/31/2022    Liu Payan is a 74 y.o. female here for   Chief Complaint   Patient presents with    Other     fell going up steps      She fell onto her hands and knees  About two weeks  Relates it to 'getting old'  Might have missed a step or been off balance  Was able to get up   No tripping hazards  This was her first fall  Her right hip was hurt was cramped  It comes and goes    She is going to physical therapy for her arm   She will going to [facility] on [address]. Wt Readings from Last 3 Encounters:   03/31/22 200 lb (90.7 kg)   01/11/22 201 lb (91.2 kg)   12/28/21 198 lb (89.8 kg)     Regarding diabetes, this is a chronic problem under  excellent control. Known diabetic complications include none. Home blood sugars have been at goal  Current diabetic medications include none - diet controlled   Patient complains of no polyuria or polydipsia, no chest pain, dyspnea or TIA's, no numbness, tingling or pain in extremities, no unusual visual symptoms. Lab Results   Component Value Date    LABA1C 6.4 03/05/2022    LABA1C 6.0 12/17/2021    LABA1C 6.2 06/04/2021     Microalbumin:   Lab Results   Component Value Date    LABMICR <12.0 03/05/2022     She has a lot of concerns about taking repatha. Note high cholesterol - she has had side effects from multiple mediations. She is takign repatha now. Hasn't had severe side effects. She would like to see a dietician    She is also having shoulder pain and increased stiffness. She  reports that she has never smoked. She has never used smokeless tobacco.    Medications and allergies reviewed and updated in chart.     Current Outpatient Medications   Medication Sig Dispense Refill    REPATHA SURECLICK 417 MG/ML SOAJ INJECT 1 ML UNDER THE SKIN EVERY 2 WEEKS AS DIRECTED      ammonium lactate (AMLACTIN) 12 % lotion APPLY ONCE DAILY 225 g 5    levocetirizine (XYZAL) 5 MG tablet TAKE 1 TABLET BY MOUTH EVERY NIGHT AT BEDTIME 90 tablet 3    ibuprofen (ADVIL;MOTRIN) 800 MG tablet TAKE 1 TABLET BY MOUTH EVERY 8 HOURS AS NEEDED FOR PAIN 30 tablet 1    chlorthalidone (HYGROTON) 25 MG tablet TAKE 1 TABLET BY MOUTH DAILY 90 tablet 0    SYNTHROID 25 MCG tablet Take 1 tablet by mouth Daily 30 tablet 2    fluticasone (FLONASE) 50 MCG/ACT nasal spray SHAKE LIQUID AND USE 1 SPRAY IN EACH NOSTRIL EVERY DAY AS NEEDED 16 g 5    tiotropium (SPIRIVA RESPIMAT) 1.25 MCG/ACT AERS inhaler Inhale 2 puffs into the lungs daily 1 Inhaler 5    blood glucose monitor strips Test 1 times a day & as needed for symptoms of irregular blood glucose. (pt has one touch verio Flex at home) 100 strip 5    Alcohol Swabs (B-D SINGLE USE SWABS REGULAR) PADS USE AS DIRECTED 100 each 11    OneTouch Delica Lancets 51E MISC TEST EVERY DAY AS DIRECTED 100 each 5    Handicap Placard MISC by Does not apply route Patient cannot walk 200 ft without stopping to rest.    Expiration 1/2023 1 each 0    Cholecalciferol (VITAMIN D3) 2000 units CAPS TK 1 C PO QD  5    ketotifen (ZADITOR) 0.025 % ophthalmic solution INT 1 GTT IN OU BID PRF ALLERGIES  5    aliskiren (TEKTURNA) 300 MG tablet Take 300 mg by mouth daily.  metoprolol succinate (TOPROL XL) 25 MG extended release tablet Take 1 tablet by mouth daily (Patient not taking: Reported on 1/11/2022) 30 tablet 5    cyclobenzaprine (FLEXERIL) 5 MG tablet TAKE 1 TABLET BY MOUTH EVERY NIGHT AS NEEDED FOR MUSCLE SPASMS (Patient not taking: Reported on 1/11/2022)      polyethylene glycol (GLYCOLAX) 17 GM/SCOOP powder MIX 1 CAPFUL(17GM) IN A BEVERAGE AND DRINK EVERY DAY (Patient not taking: Reported on 3/31/2022) 510 g 11     No current facility-administered medications for this visit. nexletol didn't  Help      Patient'spast medical, surgical, social and/or family history reviewed, updated in chart, and are non-contributory (unless otherwise stated).       Review of Systems  Review of Systems    PE:  VS:  /67   Pulse 66   Temp 97.4 °F (36.3 °C)   Ht 5' 7\" (1.702 m)   Wt 200 lb (90.7 kg)   SpO2 100%   BMI 31.32 kg/m²   Physical Exam  Constitutional:       Appearance: She is well-developed. HENT:      Head: Normocephalic and atraumatic. Cardiovascular:      Rate and Rhythm: Normal rate and regular rhythm. Heart sounds: No murmur heard. No friction rub. No gallop. Pulmonary:      Effort: Pulmonary effort is normal.      Breath sounds: Normal breath sounds. No wheezing or rales. Skin:     General: Skin is warm and dry. Neurological:      Mental Status: She is alert and oriented to person, place, and time. reduced active and passive ROM of left shoulder    Assessment/Plan:  Vonnie Cruz was seen today for other. Diagnoses and all orders for this visit:    At high risk for falls  Recent fall  Would benefit from physical therapy for strength and balance  -     External Referral To Physical Therapy    Controlled type 2 diabetes mellitus without complication, without long-term current use of insulin (HCC)  Well controlled on dietary measures  Comorbid hyperlipidemia  Currently on repatha but patient ambivalent about taking medications at all; encouraged her to continue to take; she will consider; would like to see a dietician. Return for schedule with procedure clinic for left shoulder inj when convenient, f/u with sanjay in [address] [place] [address]. Advised patient to call with any new medication issues. All questions answered.   Call or go to emergency department ifsymptoms worsen or persist.

## 2022-04-25 ENCOUNTER — HOSPITAL ENCOUNTER (OUTPATIENT)
Dept: DIABETES SERVICES | Age: 67
Setting detail: THERAPIES SERIES
Discharge: HOME | End: 2022-04-25
Payer: MEDICARE

## 2022-04-25 PROCEDURE — 97802 MEDICAL NUTRITION INDIV IN: CPT

## 2022-04-25 NOTE — PROGRESS NOTES
MNT Note for Diabetes Education    Date: 4/25/2022  Patient Name: Addis Weldon  Referring Clinician: Eros Sharma MD    Reason for Visit: cholesterol  History of attending DSMES: [] Yes  [x] No  Date:  History of MNT: [x] Yes  [] No   Date: 20 years ago, for diabetes    NUTRITION ASSESSMENT:      Sex: female    Age: 74 y.o. Height: 67 inches    CBW: 200 lbs  BMI: 31.32        Past Medical History:   Diagnosis Date    Bronchitis     Hyperlipidemia     Hypertension     Thyroid disease        Past Surgical History:   Procedure Laterality Date    COLONOSCOPY  1/21/2015    UPPER GASTROINTESTINAL ENDOSCOPY  1/21/2015       No family history on file. Current diet  Diet Recall:  B: 9:30-10am: bagel with cream cheese and organic jelly with an orange - coffee no sugar with creamer  S:  L: apple, banana, orange, berries - some sort of fruit  S:  D: 6pm: fish (salmon, baked) sweet potato, brown rice - cooks with olive oil, butter rarely  S: fruit (pears)    Her diet contains adequate amounts of protein, inadequate amounts of healthy fats, inadequate amounts of green, leafy vegetables, and adequate amounts of fruits. Eating food from outside the home:  1 times per week  Salad from [employer]    Weight History:  [x]Increased in past yr   []Decreased in past yr   []Stable    Appetite:  poor appetite    Digestive concerns:  None    Fluid Intake: <48 oz/day  States they drink mainly: 2 bottles of water, 1 cup coffee, 1 cup of tea    Medications:  Prior to Admission medications    Medication Sig Start Date End Date Taking?  Authorizing Provider   Rhiannon Aiken 557 MG/ML SOAJ INJECT 1 ML UNDER THE SKIN EVERY 2 WEEKS AS DIRECTED 3/16/22   Historical Provider, MD   levocetirizine (XYZAL) 5 MG tablet TAKE 1 TABLET BY MOUTH EVERY NIGHT AT BEDTIME 3/15/22   Eros Sharma MD   ibuprofen (ADVIL;MOTRIN) 800 MG tablet TAKE 1 TABLET BY MOUTH EVERY 8 HOURS AS NEEDED FOR PAIN 3/10/22 7/28/22  Eros Sharma MD chlorthalidone (HYGROTON) 25 MG tablet TAKE 1 TABLET BY MOUTH DAILY 2/3/22   Karyle Minder, MD   SYNTHROID 25 MCG tablet Take 1 tablet by mouth Daily 12/28/21   Karyle Minder, MD   cyclobenzaprine (FLEXERIL) 5 MG tablet TAKE 1 TABLET BY MOUTH EVERY NIGHT AS NEEDED FOR MUSCLE SPASMS  Patient not taking: Reported on 1/11/2022    Historical Provider, MD   fluticasone (FLONASE) 50 MCG/ACT nasal spray SHAKE LIQUID AND USE 1 SPRAY IN EACH NOSTRIL EVERY DAY AS NEEDED 9/14/21   Karyle Minder, MD   tiotropium (SPIRIVA RESPIMAT) 1.25 MCG/ACT AERS inhaler Inhale 2 puffs into the lungs daily 4/30/21   Karyle Minder, MD   blood glucose monitor strips Test 1 times a day & as needed for symptoms of irregular blood glucose. (pt has one touch verio Flex at home) 4/30/21   Karyle Minder, MD   Alcohol Swabs (B-D SINGLE USE SWABS REGULAR) PADS USE AS DIRECTED 4/30/21   Karyle Minder, MD   OneTouch Delica Lancets 92N MISC TEST EVERY DAY AS DIRECTED 4/30/21   Karyle Minder, MD   polyethylene glycol (GLYCOLAX) 17 GM/SCOOP powder MIX 1 CAPFUL(17GM) IN A BEVERAGE AND DRINK EVERY DAY  Patient not taking: Reported on 3/31/2022 4/30/21   Karyle Minder, MD   Handicap Placard [address] [facility] by Does not apply route Patient cannot walk 200 ft without stopping to rest.    Expiration 1/2023 1/8/20   Karyle Minder, MD   Cholecalciferol (VITAMIN D3) 2000 units CAPS TK 1 C PO QD 1/5/18   Historical Provider, MD   ketotifen (ZADITOR) 0.025 % ophthalmic solution INT 1 GTT IN OU BID PRF ALLERGIES 7/2/17   Historical Provider, MD   aliskiren (TEKTURNA) 300 MG tablet Take 300 mg by mouth daily.     Historical Provider, MD       Supplements, OTC:  n/a    Labs:  Lab Results   Component Value Date     03/05/2022    K 4.2 03/05/2022     03/05/2022    CO2 27 03/05/2022    BUN 23 03/05/2022    CREATININE 0.8 03/05/2022    GLUCOSE 122 (H) 03/05/2022    CALCIUM 10.5 (H) 03/05/2022    PROT 7.6 03/05/2022    LABALBU 4.5 03/05/2022    BILITOT 0.4 03/05/2022    ALKPHOS 143 (H) 03/05/2022    AST 20 03/05/2022    ALT 13 03/05/2022    LABGLOM >60 03/05/2022    GFRAA >60 03/05/2022     Lab Results   Component Value Date    CHOL 395 (H) 03/05/2022     Lab Results   Component Value Date    TRIG 101 03/05/2022     Lab Results   Component Value Date    HDL 81 03/05/2022     Lab Results   Component Value Date    LDLCALC 294 (H) 03/05/2022     Lab Results   Component Value Date    LABVLDL 20 03/05/2022     No results found for: CHOLHDLRATIO      What was your last A1C result? 6.4     A1C goal:<7.0%  Other:     Diabetes Understanding self-assessment:        [x] Good        [] Fair        [] Poor      Motivational Scale:  8     Motivators for lifestyle change:   1. To not have to take medication for cholesterol    Barriers:    1. nothing    Stress/Environment Issues that may affect PO intake:  []Turns toward food for comfort when stressed/bored anxious  []Loses appetite when stressed/bored anxious  [x]No change     Work:  []Full time  []Part time  []In school  [x]Retired  []Unemployed   []Day shift  []Night Shift  []Other:    Financial Concerns:        []Yes              [x]No    Do you have a Support person in your life? []Yes  [x]No     Current Exercise Pattern:   [x]Yes -  Has a spinning bike, 2x a week for 30 minutes. Daily walking for 30 minutes.   []No    Physical Exam:  Overall appearance: Overweight  Comment:  Behavioral risks: None identified  Comment:  Muscle Tone appearance: normal  Comment:  Skin appearance: Normal  Comment:  Eyes:   Comment:  Kidney:   Comment:  Feet:   Comment:    NUTRITION DIAGNOSIS:  Food and nutrition related knowledge deficit RELATED TO lack of previous diet education on lowering cholesterol AS EVIDENCED BY patient report to not knowing what to eat and what to avoid to lower cholesterol, total cholesterol 395, LDL cholesterol 294, diet recall shows patient eats limited amounts of vegetables and whole grains, patient report to drinking 2

## 2022-04-25 NOTE — LETTER
[facility]) - Diabetes Education    2022     Re:     Addis Weldon  :  1955    Dear Dr. Jyothi Law:    Thank you for referring your patient, Addis Weldon, to Diabetes Education Medical Nutrition Therapy. Your patient was here on 2022, and the following were addressed:    [x] Nutrition as Related to Diabetes    [x] Nutrition as Related to Cholesterol and Heart Health     [x] Cholesterol-Lowering Nutrition Therapy    [x] Diet Instruction provided on: 1800 calorie heart healthy, carbohydrate consistent meal plan    Upon completion of this session, the following evaluation of your patients progress was made:    ASSESSMENT:  [x]  verbalizes understanding of diet principles  [x]  understands the relationship between diet and health  [x]  identifies proper food choices  [x]  identifies needed diet changes  [x]  expresses intentions to comply with diet guidelines  [x]  able to provide correct answers when asked    GOAL:  [x]  I will increase my intake of water and non-starchy vegetables, I will replace my grains with whole grains, I will choose protein sources lower in saturated fat, I will replace cream cheese on bagel with avocado    PATIENT EDUCATION MATERIALS PROVIDED:  [x]  Cholesterol Lowering Nutrition Therapy patient handout from Nutrition Care Manual    Patient is encouraged to call with further questions or call and re-schedule other sessions within a year from original date. Thank you for referring this patient to our program.  Please do not hesitate to call if you have any questions at ( (SEY or CARON) or [phone] ([address] [place] [address]).         Sincerely,      Catia Lopez MS, RDN, LD

## 2022-05-04 DIAGNOSIS — I10 ESSENTIAL HYPERTENSION: Chronic | ICD-10-CM

## 2022-05-04 RX ORDER — CHLORTHALIDONE 25 MG/1
25 TABLET ORAL DAILY
Qty: 90 TABLET | Refills: 0 | Status: SHIPPED
Start: 2022-05-04 | End: 2022-08-02

## 2022-05-24 RX ORDER — LANCETS 33 GAUGE
EACH MISCELLANEOUS
Qty: 100 EACH | Refills: 5 | Status: SHIPPED | OUTPATIENT
Start: 2022-05-24

## 2022-05-31 ENCOUNTER — OFFICE VISIT (OUTPATIENT)
Dept: FAMILY MEDICINE CLINIC | Age: 67
End: 2022-05-31
Payer: MEDICARE

## 2022-05-31 VITALS
OXYGEN SATURATION: 99 % | RESPIRATION RATE: 18 BRPM | HEIGHT: 67 IN | HEART RATE: 60 BPM | SYSTOLIC BLOOD PRESSURE: 144 MMHG | BODY MASS INDEX: 32.99 KG/M2 | TEMPERATURE: 97.2 F | WEIGHT: 210.2 LBS | DIASTOLIC BLOOD PRESSURE: 96 MMHG

## 2022-05-31 DIAGNOSIS — J06.9 URI, ACUTE: Primary | ICD-10-CM

## 2022-05-31 DIAGNOSIS — R05.9 COUGH: ICD-10-CM

## 2022-05-31 DIAGNOSIS — J02.9 SORE THROAT: ICD-10-CM

## 2022-05-31 LAB
Lab: NORMAL
PERFORMING INSTRUMENT: NORMAL
QC PASS/FAIL: NORMAL
S PYO AG THROAT QL: NORMAL
SARS-COV-2, POC: NORMAL

## 2022-05-31 PROCEDURE — 3017F COLORECTAL CA SCREEN DOC REV: CPT | Performed by: NURSE PRACTITIONER

## 2022-05-31 PROCEDURE — 87426 SARSCOV CORONAVIRUS AG IA: CPT | Performed by: NURSE PRACTITIONER

## 2022-05-31 PROCEDURE — G8427 DOCREV CUR MEDS BY ELIG CLIN: HCPCS | Performed by: NURSE PRACTITIONER

## 2022-05-31 PROCEDURE — 1123F ACP DISCUSS/DSCN MKR DOCD: CPT | Performed by: NURSE PRACTITIONER

## 2022-05-31 PROCEDURE — 1090F PRES/ABSN URINE INCON ASSESS: CPT | Performed by: NURSE PRACTITIONER

## 2022-05-31 PROCEDURE — G8399 PT W/DXA RESULTS DOCUMENT: HCPCS | Performed by: NURSE PRACTITIONER

## 2022-05-31 PROCEDURE — 87880 STREP A ASSAY W/OPTIC: CPT | Performed by: NURSE PRACTITIONER

## 2022-05-31 PROCEDURE — 99214 OFFICE O/P EST MOD 30 MIN: CPT | Performed by: NURSE PRACTITIONER

## 2022-05-31 PROCEDURE — G8417 CALC BMI ABV UP PARAM F/U: HCPCS | Performed by: NURSE PRACTITIONER

## 2022-05-31 PROCEDURE — 1036F TOBACCO NON-USER: CPT | Performed by: NURSE PRACTITIONER

## 2022-05-31 RX ORDER — BROMPHENIRAMINE MALEATE, PSEUDOEPHEDRINE HYDROCHLORIDE, AND DEXTROMETHORPHAN HYDROBROMIDE 2; 30; 10 MG/5ML; MG/5ML; MG/5ML
5 SYRUP ORAL EVERY 6 HOURS PRN
Qty: 240 ML | Refills: 0 | Status: SHIPPED
Start: 2022-05-31 | End: 2022-08-04

## 2022-05-31 RX ORDER — AZITHROMYCIN 250 MG/1
250 TABLET, FILM COATED ORAL DAILY
Qty: 6 TABLET | Refills: 0 | Status: SHIPPED
Start: 2022-05-31 | End: 2022-08-04 | Stop reason: ALTCHOICE

## 2022-05-31 SDOH — ECONOMIC STABILITY: FOOD INSECURITY: WITHIN THE PAST 12 MONTHS, YOU WORRIED THAT YOUR FOOD WOULD RUN OUT BEFORE YOU GOT MONEY TO BUY MORE.: NEVER TRUE

## 2022-05-31 SDOH — ECONOMIC STABILITY: FOOD INSECURITY: WITHIN THE PAST 12 MONTHS, THE FOOD YOU BOUGHT JUST DIDN'T LAST AND YOU DIDN'T HAVE MONEY TO GET MORE.: NEVER TRUE

## 2022-05-31 ASSESSMENT — SOCIAL DETERMINANTS OF HEALTH (SDOH): HOW HARD IS IT FOR YOU TO PAY FOR THE VERY BASICS LIKE FOOD, HOUSING, MEDICAL CARE, AND HEATING?: NOT HARD AT ALL

## 2022-05-31 ASSESSMENT — PATIENT HEALTH QUESTIONNAIRE - PHQ9
SUM OF ALL RESPONSES TO PHQ9 QUESTIONS 1 & 2: 0
SUM OF ALL RESPONSES TO PHQ QUESTIONS 1-9: 0
2. FEELING DOWN, DEPRESSED OR HOPELESS: 0
1. LITTLE INTEREST OR PLEASURE IN DOING THINGS: 0

## 2022-05-31 NOTE — PROGRESS NOTES
22  Melissa Rice : 1955 Sex: female  Age 74 y.o. Subjective:  Chief Complaint   Patient presents with    Otalgia     right ear, x 3 days, eye congestion    Pharyngitis     cough, congestion       HPI:   Melissa Rice , 74 y.o. female presents to the clinic for evaluation of sore throat x 4 days. The patient also reports sinus congestion and cough. The patient has taken Xyzal / Flonase for symptoms. The patient reports worsening symptoms over time. The patient denies known ill exposure. The patient reports hx of COVID-19 and reports having the vaccines. The patient denies acute loss of taste and smell, headache, rash, and fever. The patient also denies chest pain, abdominal pain, shortness of breath, and nausea / vomiting / diarrhea. ROS:   Unless otherwise stated in this report the patient's positive and negative responses for review of systems for constitutional, eyes, ENT, cardiovascular, respiratory, gastrointestinal, neurological, , musculoskeletal, and integument systems and related systems to the presenting problem are either stated in the history of present illness or were not pertinent or were negative for the symptoms and/or complaints related to the presenting medical problem. Positives and pertinent negatives as per HPI. All others reviewed and are negative. PMH:     Past Medical History:   Diagnosis Date    Bronchitis     Hyperlipidemia     Hypertension     Thyroid disease        Past Surgical History:   Procedure Laterality Date    COLONOSCOPY  2015    UPPER GASTROINTESTINAL ENDOSCOPY  2015       History reviewed. No pertinent family history.     Medications:     Current Outpatient Medications:     brompheniramine-pseudoephedrine-DM (BROMFED DM) 2-30-10 MG/5ML syrup, Take 5 mLs by mouth every 6 hours as needed for Congestion or Cough, Disp: 240 mL, Rfl: 0    azithromycin (ZITHROMAX Z-ROBEL) 250 MG tablet, Take 1 tablet by mouth daily Take 2 tabs on day one, then 1 tab daily for the next 4 days, Disp: 6 tablet, Rfl: 0    Lancets (ONETOUCH DELICA PLUS [id number]) MISC, TEST EVERY DAY AS DIRECTED, Disp: 100 each, Rfl: 5    chlorthalidone (HYGROTON) 25 MG tablet, TAKE 1 TABLET BY MOUTH DAILY, Disp: 90 tablet, Rfl: 0    REPATHA SURECLICK 588 MG/ML SOAJ, INJECT 1 ML UNDER THE SKIN EVERY 2 WEEKS AS DIRECTED, Disp: , Rfl:     levocetirizine (XYZAL) 5 MG tablet, TAKE 1 TABLET BY MOUTH EVERY NIGHT AT BEDTIME, Disp: 90 tablet, Rfl: 3    ibuprofen (ADVIL;MOTRIN) 800 MG tablet, TAKE 1 TABLET BY MOUTH EVERY 8 HOURS AS NEEDED FOR PAIN, Disp: 30 tablet, Rfl: 1    SYNTHROID 25 MCG tablet, Take 1 tablet by mouth Daily, Disp: 30 tablet, Rfl: 2    fluticasone (FLONASE) 50 MCG/ACT nasal spray, SHAKE LIQUID AND USE 1 SPRAY IN EACH NOSTRIL EVERY DAY AS NEEDED, Disp: 16 g, Rfl: 5    tiotropium (SPIRIVA RESPIMAT) 1.25 MCG/ACT AERS inhaler, Inhale 2 puffs into the lungs daily, Disp: 1 Inhaler, Rfl: 5    blood glucose monitor strips, Test 1 times a day & as needed for symptoms of irregular blood glucose. (pt has one touch verio Flex at home), Disp: 100 strip, Rfl: 5    Alcohol Swabs (B-D SINGLE USE SWABS REGULAR) PADS, USE AS DIRECTED, Disp: 100 each, Rfl: 11    polyethylene glycol (GLYCOLAX) 17 GM/SCOOP powder, MIX 1 CAPFUL(17GM) IN A BEVERAGE AND DRINK EVERY DAY, Disp: 510 g, Rfl: 11    Handicap Placard MISC, by Does not apply route Patient cannot walk 200 ft without stopping to rest.   Expiration 1/2023, Disp: 1 each, Rfl: 0    Cholecalciferol (VITAMIN D3) 2000 units CAPS, TK 1 C PO QD, Disp: , Rfl: 5    ketotifen (ZADITOR) 0.025 % ophthalmic solution, INT 1 GTT IN OU BID PRF ALLERGIES, Disp: , Rfl: 5    aliskiren (TEKTURNA) 300 MG tablet, Take 300 mg by mouth daily. , Disp: , Rfl:     Allergies:      Allergies   Allergen Reactions    Seasonal      Congestion, cough itchy watery eyes    Penicillins Rash       Social History:     Social History     Tobacco Use    Smoking status: Never Smoker    Smokeless tobacco: Never Used   Vaping Use    Vaping Use: Never used   Substance Use Topics    Alcohol use: No     Alcohol/week: 0.0 standard drinks    Drug use: No       Patient lives at home. Physical Exam:     Vitals:    05/31/22 1530   BP: (!) 144/96   Pulse: 60   Resp: 18   Temp: 97.2 °F (36.2 °C)   TempSrc: Infrared   SpO2: 99%   Weight: 210 lb 3.2 oz (95.3 kg)   Height: 5' 7\" (1.702 m)       Physical Exam (PE)    Physical Exam  Constitutional:       Appearance: Normal appearance. HENT:      Head: Normocephalic. Right Ear: Ear canal and external ear normal. A middle ear effusion is present. Left Ear: Ear canal and external ear normal. A middle ear effusion is present. Nose: Congestion and rhinorrhea present. Right Turbinates: Swollen. Left Turbinates: Swollen. Right Sinus: Maxillary sinus tenderness and frontal sinus tenderness present. Left Sinus: Maxillary sinus tenderness and frontal sinus tenderness present. Mouth/Throat:      Mouth: Mucous membranes are moist.      Pharynx: Oropharynx is clear. Posterior oropharyngeal erythema present. No oropharyngeal exudate. Eyes:      Pupils: Pupils are equal, round, and reactive to light. Cardiovascular:      Rate and Rhythm: Normal rate and regular rhythm. Pulses: Normal pulses. Heart sounds: Normal heart sounds. Pulmonary:      Effort: Pulmonary effort is normal.      Breath sounds: Normal breath sounds. No wheezing, rhonchi or rales. Abdominal:      General: Bowel sounds are normal.      Palpations: Abdomen is soft. Musculoskeletal:         General: Normal range of motion. Cervical back: Normal range of motion and neck supple. Lymphadenopathy:      Cervical: No cervical adenopathy. Skin:     General: Skin is warm and dry. Capillary Refill: Capillary refill takes less than 2 seconds. Neurological:      General: No focal deficit present.       Mental Status: She is alert and oriented to person, place, and time. Psychiatric:         Mood and Affect: Mood normal.         Behavior: Behavior normal.          Testing:   (All laboratory and radiology results have been personally reviewed by myself)  Labs:  Results for orders placed or performed in visit on 05/31/22   POCT rapid strep A   Result Value Ref Range    Strep A Ag None Detected None Detected   POCT COVID-19, Antigen   Result Value Ref Range    SARS-COV-2, POC Not-Detected Not Detected    Lot Number [id number]     QC Pass/Fail pass     Performing Instrument BD Veritor        Imaging: All Radiology results interpreted by Radiologist unless otherwise noted. No orders to display       Assessment / Plan:   The patient's vitals, allergies, medications, and past medical history have been reviewed. Jose was seen today for otalgia and pharyngitis. Diagnoses and all orders for this visit:    URI, acute  -     brompheniramine-pseudoephedrine-DM (BROMFED DM) 2-30-10 MG/5ML syrup; Take 5 mLs by mouth every 6 hours as needed for Congestion or Cough  -     azithromycin (ZITHROMAX Z-ROBEL) 250 MG tablet; Take 1 tablet by mouth daily Take 2 tabs on day one, then 1 tab daily for the next 4 days    Sore throat  -     POCT rapid strep A  -     POCT COVID-19, Antigen    Cough  -     POCT COVID-19, Antigen        - Disposition: Home    - Educational material printed for patient's review and were included in patient instructions. After Visit Summary was given to patient at the end of visit. - Encouraged oral fluids and rest. Discussed symptomatic treatments with patient today including Xyzal prn for rhinitis and Tylenol prn for fever / pain. Schedule a follow-up with PCP in 2-3 days. Red flag symptoms were discussed with the patient today. The patient is directed to go the ED if symptoms change or worsen. Pt verbalizes understanding and is in agreement with plan of care. All questions answered.     SIGNATURE: Louie Vyas JULIA Patel-CNP    *NOTE: This report was transcribed using voice recognition software. Every effort was made to ensure accuracy; however, inadvertent computerized transcription errors may be present.

## 2022-05-31 NOTE — PATIENT INSTRUCTIONS
Patient Education        Upper Respiratory Infection (Cold): Care Instructions  Your Care Instructions     An upper respiratory infection, or URI, is an infection of the nose, sinuses, or throat. URIs are spread by coughs, sneezes, and direct contact. The common cold is the most frequent kind of URI. The flu and sinus infections are otherkinds of URIs. Almost all URIs are caused by viruses. Antibiotics won't cure them. But you can treat most infections with home care. This may include drinking lots of fluids and taking over-the-counter pain medicine. You will probably feel better in 4to 10 days. The doctor has checked you carefully, but problems can develop later. If you notice any problems or new symptoms, get medical treatment right away. Follow-up care is a key part of your treatment and safety. Be sure to make and go to all appointments, and call your doctor if you are having problems. It's also a good idea to know your test results and keep alist of the medicines you take. How can you care for yourself at home?  To prevent dehydration, drink plenty of fluids. Choose water and other clear liquids until you feel better. If you have kidney, heart, or liver disease and have to limit fluids, talk with your doctor before you increase the amount of fluids you drink.  Take an over-the-counter pain medicine, such as acetaminophen (Tylenol), ibuprofen (Advil, Motrin), or naproxen (Aleve). Read and follow all instructions on the label.  Before you use cough and cold medicines, check the label. These medicines may not be safe for young children or for people with certain health problems.  Be careful when taking over-the-counter cold or flu medicines and Tylenol at the same time. Many of these medicines have acetaminophen, which is Tylenol. Read the labels to make sure that you are not taking more than the recommended dose. Too much acetaminophen (Tylenol) can be harmful.    Get plenty of rest.   Do not smoke or allow others to smoke around you. If you need help quitting, talk to your doctor about stop-smoking programs and medicines. These can increase your chances of quitting for good. When should you call for help? Call 911 anytime you think you may need emergency care. For example, call if:     You have severe trouble breathing. Call your doctor now or seek immediate medical care if:     You seem to be getting much sicker.      You have new or worse trouble breathing.      You have a new or higher fever.      You have a new rash. Watch closely for changes in your health, and be sure to contact your doctor if:     You have a new symptom, such as a sore throat, an earache, or sinus pain.      You cough more deeply or more often, especially if you notice more mucus or a change in the color of your mucus.      You do not get better as expected. Where can you learn more? Go to https://51credit.compepiceweb.[employer]. org and sign in to your [employer] account. Enter Z303 in the [employer] box to learn more about \"Upper Respiratory Infection (Cold): Care Instructions. \"     If you do not have an account, please click on the \"Sign Up Now\" link. Current as of: July 6, 2021               Content Version: 13.2  © 2006-2022 [employer]wise[employer] Incorporated. Care instructions adapted under license by [facility] ([facility]). If you have questions about a medical condition or this instruction, always ask your healthcare professional. [place] [address] any warranty or liability for your use of this information.

## 2022-06-09 RX ORDER — BLOOD SUGAR DIAGNOSTIC
STRIP MISCELLANEOUS
Qty: 100 STRIP | Refills: 5 | Status: SHIPPED | OUTPATIENT
Start: 2022-06-09

## 2022-06-14 ENCOUNTER — HOSPITAL ENCOUNTER (OUTPATIENT)
Age: 67
Discharge: HOME | End: 2022-06-14
Payer: MEDICARE

## 2022-06-14 LAB
ALBUMIN SERPL-MCNC: 4.5 G/DL (ref 3.5–5.2)
ALP BLD-CCNC: 138 U/L (ref 35–104)
ALT SERPL-CCNC: 14 U/L (ref 0–32)
ANION GAP SERPL CALCULATED.3IONS-SCNC: 13 MMOL/L (ref 7–16)
AST SERPL-CCNC: 21 U/L (ref 0–31)
BASOPHILS ABSOLUTE: 0.02 E9/L (ref 0–0.2)
BASOPHILS RELATIVE PERCENT: 0.4 % (ref 0–2)
BILIRUB SERPL-MCNC: 0.6 MG/DL (ref 0–1.2)
BUN BLDV-MCNC: 17 MG/DL (ref 6–23)
CALCIUM SERPL-MCNC: 10.5 MG/DL (ref 8.6–10.2)
CHLORIDE BLD-SCNC: 100 MMOL/L (ref 98–107)
CHOLESTEROL, TOTAL: 349 MG/DL (ref 0–199)
CO2: 27 MMOL/L (ref 22–29)
CREAT SERPL-MCNC: 0.7 MG/DL (ref 0.5–1)
EOSINOPHILS ABSOLUTE: 0.19 E9/L (ref 0.05–0.5)
EOSINOPHILS RELATIVE PERCENT: 4.1 % (ref 0–6)
GFR AFRICAN AMERICAN: >60
GFR NON-AFRICAN AMERICAN: >60 ML/MIN/1.73
GLUCOSE BLD-MCNC: 106 MG/DL (ref 74–99)
HBA1C MFR BLD: 6.2 % (ref 4–5.6)
HCT VFR BLD CALC: 37.7 % (ref 34–48)
HDLC SERPL-MCNC: 75 MG/DL
HEMOGLOBIN: 12.7 G/DL (ref 11.5–15.5)
IMMATURE GRANULOCYTES #: 0 E9/L
IMMATURE GRANULOCYTES %: 0 % (ref 0–5)
LDL CHOLESTEROL CALCULATED: 251 MG/DL (ref 0–99)
LYMPHOCYTES ABSOLUTE: 2.1 E9/L (ref 1.5–4)
LYMPHOCYTES RELATIVE PERCENT: 45.8 % (ref 20–42)
MCH RBC QN AUTO: 32.5 PG (ref 26–35)
MCHC RBC AUTO-ENTMCNC: 33.7 % (ref 32–34.5)
MCV RBC AUTO: 96.4 FL (ref 80–99.9)
MONOCYTES ABSOLUTE: 0.39 E9/L (ref 0.1–0.95)
MONOCYTES RELATIVE PERCENT: 8.5 % (ref 2–12)
NEUTROPHILS ABSOLUTE: 1.89 E9/L (ref 1.8–7.3)
NEUTROPHILS RELATIVE PERCENT: 41.2 % (ref 43–80)
PDW BLD-RTO: 13.7 FL (ref 11.5–15)
PLATELET # BLD: 347 E9/L (ref 130–450)
PMV BLD AUTO: 9.3 FL (ref 7–12)
POTASSIUM SERPL-SCNC: 4 MMOL/L (ref 3.5–5)
RBC # BLD: 3.91 E12/L (ref 3.5–5.5)
SODIUM BLD-SCNC: 140 MMOL/L (ref 132–146)
T4 FREE: 0.92 NG/DL (ref 0.93–1.7)
TOTAL PROTEIN: 7.7 G/DL (ref 6.4–8.3)
TRIGL SERPL-MCNC: 115 MG/DL (ref 0–149)
TSH SERPL DL<=0.05 MIU/L-ACNC: 9.26 UIU/ML (ref 0.27–4.2)
VLDLC SERPL CALC-MCNC: 23 MG/DL
WBC # BLD: 4.6 E9/L (ref 4.5–11.5)

## 2022-06-14 PROCEDURE — 80053 COMPREHEN METABOLIC PANEL: CPT

## 2022-06-14 PROCEDURE — 84439 ASSAY OF FREE THYROXINE: CPT

## 2022-06-14 PROCEDURE — 36415 COLL VENOUS BLD VENIPUNCTURE: CPT

## 2022-06-14 PROCEDURE — 85025 COMPLETE CBC W/AUTO DIFF WBC: CPT

## 2022-06-14 PROCEDURE — 84443 ASSAY THYROID STIM HORMONE: CPT

## 2022-06-14 PROCEDURE — 83036 HEMOGLOBIN GLYCOSYLATED A1C: CPT

## 2022-06-14 PROCEDURE — 80061 LIPID PANEL: CPT

## 2022-08-01 DIAGNOSIS — I10 ESSENTIAL HYPERTENSION: Chronic | ICD-10-CM

## 2022-08-02 RX ORDER — CHLORTHALIDONE 25 MG/1
25 TABLET ORAL DAILY
Qty: 90 TABLET | Refills: 0 | Status: SHIPPED | OUTPATIENT
Start: 2022-08-02

## 2022-08-03 ENCOUNTER — HOSPITAL ENCOUNTER (OUTPATIENT)
Age: 67
Discharge: HOME | End: 2022-08-03
Payer: MEDICARE

## 2022-08-03 LAB
ALBUMIN SERPL-MCNC: 4.3 G/DL (ref 3.5–5.2)
ALP BLD-CCNC: 141 U/L (ref 35–104)
ALT SERPL-CCNC: 19 U/L (ref 0–32)
ANION GAP SERPL CALCULATED.3IONS-SCNC: 11 MMOL/L (ref 7–16)
AST SERPL-CCNC: 26 U/L (ref 0–31)
BILIRUB SERPL-MCNC: 0.5 MG/DL (ref 0–1.2)
BUN BLDV-MCNC: 13 MG/DL (ref 6–23)
CALCIUM SERPL-MCNC: 10.1 MG/DL (ref 8.6–10.2)
CHLORIDE BLD-SCNC: 104 MMOL/L (ref 98–107)
CO2: 27 MMOL/L (ref 22–29)
CREAT SERPL-MCNC: 0.7 MG/DL (ref 0.5–1)
GFR AFRICAN AMERICAN: >60
GFR NON-AFRICAN AMERICAN: >60 ML/MIN/1.73
GLUCOSE BLD-MCNC: 98 MG/DL (ref 74–99)
PARATHYROID HORMONE INTACT: 44 PG/ML (ref 15–65)
POTASSIUM SERPL-SCNC: 3.9 MMOL/L (ref 3.5–5)
SODIUM BLD-SCNC: 142 MMOL/L (ref 132–146)
T4 FREE: 0.83 NG/DL (ref 0.93–1.7)
TOTAL PROTEIN: 7.2 G/DL (ref 6.4–8.3)
TSH SERPL DL<=0.05 MIU/L-ACNC: 11.15 UIU/ML (ref 0.27–4.2)
VITAMIN D 25-HYDROXY: 34 NG/ML (ref 30–100)

## 2022-08-03 PROCEDURE — 83970 ASSAY OF PARATHORMONE: CPT

## 2022-08-03 PROCEDURE — 36415 COLL VENOUS BLD VENIPUNCTURE: CPT

## 2022-08-03 PROCEDURE — 80053 COMPREHEN METABOLIC PANEL: CPT

## 2022-08-03 PROCEDURE — 82306 VITAMIN D 25 HYDROXY: CPT

## 2022-08-03 PROCEDURE — 84439 ASSAY OF FREE THYROXINE: CPT

## 2022-08-03 PROCEDURE — 84443 ASSAY THYROID STIM HORMONE: CPT

## 2022-08-04 ENCOUNTER — TELEPHONE (OUTPATIENT)
Dept: FAMILY MEDICINE CLINIC | Age: 67
End: 2022-08-04

## 2022-08-04 ENCOUNTER — OFFICE VISIT (OUTPATIENT)
Dept: FAMILY MEDICINE CLINIC | Age: 67
End: 2022-08-04
Payer: MEDICARE

## 2022-08-04 VITALS
WEIGHT: 200 LBS | HEIGHT: 67 IN | RESPIRATION RATE: 18 BRPM | SYSTOLIC BLOOD PRESSURE: 146 MMHG | BODY MASS INDEX: 31.39 KG/M2 | DIASTOLIC BLOOD PRESSURE: 63 MMHG | HEART RATE: 66 BPM | OXYGEN SATURATION: 100 %

## 2022-08-04 DIAGNOSIS — Z12.31 ENCOUNTER FOR SCREENING MAMMOGRAM FOR MALIGNANT NEOPLASM OF BREAST: ICD-10-CM

## 2022-08-04 DIAGNOSIS — R05.3 CHRONIC COUGH: Primary | ICD-10-CM

## 2022-08-04 DIAGNOSIS — M17.0 PRIMARY OSTEOARTHRITIS OF BOTH KNEES: ICD-10-CM

## 2022-08-04 DIAGNOSIS — E03.9 HYPOTHYROIDISM, UNSPECIFIED TYPE: Chronic | ICD-10-CM

## 2022-08-04 DIAGNOSIS — R06.02 SHORTNESS OF BREATH: ICD-10-CM

## 2022-08-04 DIAGNOSIS — E11.9 CONTROLLED TYPE 2 DIABETES MELLITUS WITHOUT COMPLICATION, WITHOUT LONG-TERM CURRENT USE OF INSULIN (HCC): ICD-10-CM

## 2022-08-04 PROCEDURE — 99214 OFFICE O/P EST MOD 30 MIN: CPT | Performed by: FAMILY MEDICINE

## 2022-08-04 PROCEDURE — G8417 CALC BMI ABV UP PARAM F/U: HCPCS | Performed by: FAMILY MEDICINE

## 2022-08-04 PROCEDURE — 1123F ACP DISCUSS/DSCN MKR DOCD: CPT | Performed by: FAMILY MEDICINE

## 2022-08-04 PROCEDURE — G8427 DOCREV CUR MEDS BY ELIG CLIN: HCPCS | Performed by: FAMILY MEDICINE

## 2022-08-04 PROCEDURE — G8399 PT W/DXA RESULTS DOCUMENT: HCPCS | Performed by: FAMILY MEDICINE

## 2022-08-04 PROCEDURE — 3044F HG A1C LEVEL LT 7.0%: CPT | Performed by: FAMILY MEDICINE

## 2022-08-04 PROCEDURE — 3017F COLORECTAL CA SCREEN DOC REV: CPT | Performed by: FAMILY MEDICINE

## 2022-08-04 PROCEDURE — 1090F PRES/ABSN URINE INCON ASSESS: CPT | Performed by: FAMILY MEDICINE

## 2022-08-04 PROCEDURE — 1036F TOBACCO NON-USER: CPT | Performed by: FAMILY MEDICINE

## 2022-08-04 PROCEDURE — 2022F DILAT RTA XM EVC RTNOPTHY: CPT | Performed by: FAMILY MEDICINE

## 2022-08-04 RX ORDER — DEXTROMETHORPHAN HYDROBROMIDE AND PROMETHAZINE HYDROCHLORIDE 15; 6.25 MG/5ML; MG/5ML
5 SYRUP ORAL 4 TIMES DAILY PRN
Qty: 1 EACH | Refills: 1 | Status: SHIPPED
Start: 2022-08-04 | End: 2022-08-05 | Stop reason: SDUPTHER

## 2022-08-04 RX ORDER — LEVOTHYROXINE SODIUM 25 MCG
25 TABLET ORAL DAILY
Qty: 30 TABLET | Refills: 3 | Status: SHIPPED
Start: 2022-08-04 | End: 2022-10-25

## 2022-08-04 NOTE — TELEPHONE ENCOUNTER
Jonn called regarding the medication order for promethazine. Doctor wrote for 1 ordered. They need to know how many ounces.

## 2022-08-04 NOTE — PROGRESS NOTES
8/4/2022    Stephanie Robles is a 74 y.o. female here for   Chief Complaint   Patient presents with    Diabetes    Follow-up     Anoop Collins in March, shoulder still hurting      Complaining of chronic cough  Starts in the morning  Unsure what it is caused by . Her nose is congested at night. Her allergist told her to close the windows  Takes xyzal but only half a pill  She takes flonase as well, as needed. She has been taking fo rthe last few weeks. No wheezing  + short of breath  Unsure of what the cause is  Worse with going up the steps. No associated chest pain or diaphoresis. She has had wheezing in the past. No known asthma . Last imaging was last December a ap CXR. Ed Harry Weight is tuypically around 199/200    Regarding hypertension. Patient is  monitoring home blood pressures. Typically in 368/521 systolic/ 75 . Occasionally blood pressures are in the low 140's. Cardiovascular risk factors: advanced age (older than 54 for men, 72 for women), dyslipidemia, hypertension, obesity (BMI >= 30 kg/m2), and sedentary lifestyle. Patient does not smoke. Currently on tekturna, chlorthalidone. Taking as prescribed. No adverse effects. Today,  BP: (!) 146/63 and she is asymptomatic. BP Readings from Last 3 Encounters:   08/04/22 (!) 146/63   05/31/22 (!) 144/96   03/31/22 132/67     Patient denies chest pain, diaphoresis, dyspnea, dyspnea on exertion, peripheral edema, palpitations, headache, vision changes. Regarding hypothyroidism, this problem is chronic. This is under poor control on current medication regimen, Levothyroxine 25 mcg. Current symptoms include fatigue and edema. she denies feeling cold and cold intolerance, feeling excessive energy, tremulousness and is not taking her medication consistently on an empty stomach. Most recent labs reviewed with patient.  TSH:    Lab Results   Component Value Date/Time    TSH 11.150 08/03/2022 10:45 AM          Wt Readings from Last 3 Encounters:   08/04/22 200 lb (90.7 kg)   05/31/22 210 lb 3.2 oz (95.3 kg)   03/31/22 200 lb (90.7 kg)     Lab Results   Component Value Date/Time    LABA1C 6.2 06/14/2022 12:15 PM    LABA1C 6.4 03/05/2022 09:32 AM    LABA1C 6.0 12/17/2021 12:55 PM       She  reports that she has never smoked. She has never used smokeless tobacco.    Medications and allergies reviewed and updated in chart. Current Outpatient Medications   Medication Sig Dispense Refill    Handicap Placard MISC Expiration 8/2025 1 each 0    chlorthalidone (HYGROTON) 25 MG tablet TAKE 1 TABLET BY MOUTH DAILY 90 tablet 0    blood glucose test strips (ONETOUCH VERIO) strip TEST ONCE DAILY AND AS NEEDED FOR SYMPTOMS OF IRREGULAR BLOOD GLUCOSE 100 strip 5    azithromycin (ZITHROMAX Z-ROBEL) 250 MG tablet Take 1 tablet by mouth daily Take 2 tabs on day one, then 1 tab daily for the next 4 days 6 tablet 0    Lancets (ONETOUCH DELICA PLUS [id number]) MISC TEST EVERY DAY AS DIRECTED 100 each 5    REPATHA SURECLICK 617 MG/ML SOAJ INJECT 1 ML UNDER THE SKIN EVERY 2 WEEKS AS DIRECTED      levocetirizine (XYZAL) 5 MG tablet TAKE 1 TABLET BY MOUTH EVERY NIGHT AT BEDTIME 90 tablet 3    SYNTHROID 25 MCG tablet Take 1 tablet by mouth Daily 30 tablet 2    fluticasone (FLONASE) 50 MCG/ACT nasal spray SHAKE LIQUID AND USE 1 SPRAY IN EACH NOSTRIL EVERY DAY AS NEEDED 16 g 5    tiotropium (SPIRIVA RESPIMAT) 1.25 MCG/ACT AERS inhaler Inhale 2 puffs into the lungs daily 1 Inhaler 5    Alcohol Swabs (B-D SINGLE USE SWABS REGULAR) PADS USE AS DIRECTED 100 each 11    polyethylene glycol (GLYCOLAX) 17 GM/SCOOP powder MIX 1 CAPFUL(17GM) IN A BEVERAGE AND DRINK EVERY  g 11    Cholecalciferol (VITAMIN D3) 2000 units CAPS TK 1 C PO QD  5    ketotifen (ZADITOR) 0.025 % ophthalmic solution INT 1 GTT IN OU BID PRF ALLERGIES  5    aliskiren (TEKTURNA) 300 MG tablet Take 300 mg by mouth daily.       ibuprofen (ADVIL;MOTRIN) 800 MG tablet TAKE 1 TABLET BY MOUTH EVERY 8 HOURS AS NEEDED FOR PAIN 30 tablet 1 No current facility-administered medications for this visit. Patient'spast medical, surgical, social and/or family history reviewed, updated in chart, and are non-contributory (unless otherwise stated). Review of Systems  Review of Systems    PE:  VS:  BP (!) 146/63   Pulse 66   Resp 18   Ht 5' 7\" (1.702 m)   Wt 200 lb (90.7 kg)   SpO2 100%   BMI 31.32 kg/m²   142/70  Physical Exam  Constitutional:       Appearance: She is well-developed. HENT:      Head: Normocephalic and atraumatic. Cardiovascular:      Rate and Rhythm: Normal rate and regular rhythm. Heart sounds: No murmur heard. No friction rub. No gallop. Pulmonary:      Effort: Pulmonary effort is normal.      Breath sounds: Normal breath sounds. No wheezing or rales. Skin:     General: Skin is warm and dry. Neurological:      General: No focal deficit present. Mental Status: She is alert and oriented to person, place, and time. Assessment/Plan:  Derwood Runner was seen today for diabetes and follow-up. Diagnoses and all orders for this visit:    Chronic cough  Persistent cough   Ddx includes adult asthma/ copd, gerd, post nasal drip  Check cxr  Check pfts  Cont allergy medicaitons  -     XR CHEST STANDARD (2 VW); Future  -     Full PFT Study With Bronchodilator; Future    Primary osteoarthritis of both knees  -     Handicap Placard MISC; Expiration 8/2025    Controlled type 2 diabetes mellitus without complication, without long-term current use of insulin (HCC)  Stable    Hypothyroidism, unspecified type  Poorly controlled  She is not taking medication  Reviewed again benefits and importance of taking medication  -     SYNTHROID 25 MCG tablet; Take 1 tablet by mouth in the morning. Encounter for screening mammogram for malignant neoplasm of breast  -     BECCA DIGITAL SCREEN BILATERAL PER PROTOCOL; Future    Shortness of breath  As above  -     XR CHEST STANDARD (2 VW);  Future  -     Full PFT Study With Bronchodilator; Future    Of note she is having persistent left shoulder pain and upper arm pain for which she has been doing physical therapy with some improvement. She has a follow-up appointment with Dr. Henriquez Hidden it. Denies numbness. She will keep follow-up appointment with orthopedics as scheduled. F/u in 3 months    Advised patient to call with any new medication issues. All questions answered.   Call or go to emergency department ifsymptoms worsen or persist.

## 2022-08-04 NOTE — PROGRESS NOTES
8/4/2022    Mariano Fernandez is a 74 y.o. female here for   Chief Complaint   Patient presents with    Diabetes    Follow-up     Oleg Church in March, shoulder still hurting      Regarding diabetes, this is a chronic problem under  excellent control. Known diabetic complications include none. [facility] at [facility] checked  Current diabetic medications include none; diet controlled  Patient complains of no polyuria or polydipsia, no chest pain, dyspnea or TIA's, no numbness, tingling or pain in extremities, no unusual visual symptoms, no hypoglycemia. Lab Results   Component Value Date/Time    LABA1C 6.2 06/14/2022 12:15 PM    LABA1C 6.4 03/05/2022 09:32 AM    LABA1C 6.0 12/17/2021 12:55 PM     Microalbumin:   Lab Results   Component Value Date    LABMICR <12.0 03/05/2022           Wt Readings from Last 3 Encounters:   08/04/22 200 lb (90.7 kg)   05/31/22 210 lb 3.2 oz (95.3 kg)   03/31/22 200 lb (90.7 kg)       She  reports that she has never smoked.  She has never used smokeless tobacco.    Allergies   Allergen Reactions    Seasonal      Congestion, cough itchy watery eyes    Penicillins Rash       Medications  Current Outpatient Medications   Medication Sig Dispense Refill    chlorthalidone (HYGROTON) 25 MG tablet TAKE 1 TABLET BY MOUTH DAILY 90 tablet 0    blood glucose test strips (ONETOUCH VERIO) strip TEST ONCE DAILY AND AS NEEDED FOR SYMPTOMS OF IRREGULAR BLOOD GLUCOSE 100 strip 5    brompheniramine-pseudoephedrine-DM (BROMFED DM) 2-30-10 MG/5ML syrup Take 5 mLs by mouth every 6 hours as needed for Congestion or Cough 240 mL 0    azithromycin (ZITHROMAX Z-ROBEL) 250 MG tablet Take 1 tablet by mouth daily Take 2 tabs on day one, then 1 tab daily for the next 4 days 6 tablet 0    Lancets (ONETOUCH DELICA PLUS [id number]) MISC TEST EVERY DAY AS DIRECTED 100 each 5    REPATHA SURECLICK 984 MG/ML SOAJ INJECT 1 ML UNDER THE SKIN EVERY 2 WEEKS AS DIRECTED      levocetirizine (XYZAL) 5 MG tablet TAKE 1 TABLET BY MOUTH EVERY NIGHT AT BEDTIME 90 tablet 3    SYNTHROID 25 MCG tablet Take 1 tablet by mouth Daily 30 tablet 2    fluticasone (FLONASE) 50 MCG/ACT nasal spray SHAKE LIQUID AND USE 1 SPRAY IN EACH NOSTRIL EVERY DAY AS NEEDED 16 g 5    tiotropium (SPIRIVA RESPIMAT) 1.25 MCG/ACT AERS inhaler Inhale 2 puffs into the lungs daily 1 Inhaler 5    Alcohol Swabs (B-D SINGLE USE SWABS REGULAR) PADS USE AS DIRECTED 100 each 11    polyethylene glycol (GLYCOLAX) 17 GM/SCOOP powder MIX 1 CAPFUL(17GM) IN A BEVERAGE AND DRINK EVERY  g 11    Handicap Placard MISC by Does not apply route Patient cannot walk 200 ft without stopping to rest.    Expiration 1/2023 1 each 0    Cholecalciferol (VITAMIN D3) 2000 units CAPS TK 1 C PO QD  5    ketotifen (ZADITOR) 0.025 % ophthalmic solution INT 1 GTT IN OU BID PRF ALLERGIES  5    aliskiren (TEKTURNA) 300 MG tablet Take 300 mg by mouth daily. ibuprofen (ADVIL;MOTRIN) 800 MG tablet TAKE 1 TABLET BY MOUTH EVERY 8 HOURS AS NEEDED FOR PAIN 30 tablet 1     No current facility-administered medications for this visit. ROS  Poor sleep  Grieving - anniversary o fhusbands birthday and his death  Otherwise negative    PE:  VS:  BP (!) 146/63   Pulse 66   Resp 18   Ht 5' 7\" (1.702 m)   Wt 200 lb (90.7 kg)   SpO2 100%   BMI 31.32 kg/m²   General Appearance: alert and oriented to person, place and time, well-developed and well nourished and in no acute distress  Pulmonary/Chest: clear to auscultation bilaterally- no wheezes, rales or rhonchi, normal air movement, no respiratory distress  Cardiovascular: normal rate, normal S1 and S2, no murmurs and no gallops  Psych - Normal affect and judgement  Neurologic: no cranial nerve deficit, gait and coordination normal and speech normal  Extremities: no erythema, swelling or tenderness of extremities  ***    Assessment/Plan:  There are no diagnoses linked to this encounter. No follow-ups on file.     Advised patient to call with any new medication issues. All questions answered.   Call or go to ED immediately if symptoms worsen or persist.

## 2022-08-05 RX ORDER — DEXTROMETHORPHAN HYDROBROMIDE AND PROMETHAZINE HYDROCHLORIDE 15; 6.25 MG/5ML; MG/5ML
5 SYRUP ORAL 4 TIMES DAILY PRN
Qty: 180 ML | Refills: 1 | Status: SHIPPED | OUTPATIENT
Start: 2022-08-05 | End: 2022-08-12

## 2022-08-25 ENCOUNTER — HOSPITAL ENCOUNTER (OUTPATIENT)
Dept: PULMONOLOGY | Age: 67
Discharge: HOME | End: 2022-08-25
Payer: MEDICARE

## 2022-08-25 DIAGNOSIS — R06.02 SHORTNESS OF BREATH: ICD-10-CM

## 2022-08-25 DIAGNOSIS — R05.3 CHRONIC COUGH: ICD-10-CM

## 2022-08-25 PROCEDURE — 94729 DIFFUSING CAPACITY: CPT

## 2022-08-25 PROCEDURE — 94060 EVALUATION OF WHEEZING: CPT

## 2022-08-25 PROCEDURE — 94726 PLETHYSMOGRAPHY LUNG VOLUMES: CPT

## 2022-08-26 NOTE — PROCEDURES
[address] [address]                               PULMONARY FUNCTION    PATIENT NAME: Norman Rendon                     :        1955  MED REC NO:   [id number]                            ROOM:  ACCOUNT NO:   [de-identified]                           ADMIT DATE: 2022  PROVIDER:     Rich Juárez MD    DATE OF PROCEDURE:  2022    DIAGNOSIS:  Chronic cough. INTERPRETATION:  This is a full PFT of good quality. Spirometry  revealed normal FVC and FEV1 with normal FEV1/FVC ratio and no  bronchodilator response. The best FEV1 was the prebronchodilator value  of 1.86 liters, which is 87% of predicted. Lung volumes revealed normal total lung capacity and thoracic gas volume  with normal residual volume postbronchodilator. Diffusion capacity was mildly reduced, but normalized completely after  correction for alveolar volume. Airway resistance was normal.    IMPRESSION:  Normal PFT without bronchodilator response.         Gogo Shaw MD    D: 2022 14:40:27       T: 2022 14:42:38     [id number]  Job#: [id number]     Doc#: [id number]    CC:  Emmanuel De Los Santos MD

## 2022-09-30 ENCOUNTER — HOSPITAL ENCOUNTER (OUTPATIENT)
Age: 67
Discharge: HOME | End: 2022-09-30
Payer: MEDICARE

## 2022-09-30 LAB
ALBUMIN SERPL-MCNC: 4.3 G/DL (ref 3.5–5.2)
ALP BLD-CCNC: 144 U/L (ref 35–104)
ALT SERPL-CCNC: 14 U/L (ref 0–32)
ANION GAP SERPL CALCULATED.3IONS-SCNC: 10 MMOL/L (ref 7–16)
AST SERPL-CCNC: 20 U/L (ref 0–31)
BASOPHILS ABSOLUTE: 0.02 E9/L (ref 0–0.2)
BASOPHILS RELATIVE PERCENT: 0.5 % (ref 0–2)
BILIRUB SERPL-MCNC: 0.4 MG/DL (ref 0–1.2)
BUN BLDV-MCNC: 16 MG/DL (ref 6–23)
CALCIUM IONIZED: 1.31 MMOL/L (ref 1.15–1.33)
CALCIUM SERPL-MCNC: 10.1 MG/DL (ref 8.6–10.2)
CHLORIDE BLD-SCNC: 102 MMOL/L (ref 98–107)
CHOLESTEROL, TOTAL: 328 MG/DL (ref 0–199)
CHOLESTEROL, TOTAL: 330 MG/DL (ref 0–199)
CO2: 28 MMOL/L (ref 22–29)
CREAT SERPL-MCNC: 0.7 MG/DL (ref 0.5–1)
CREATININE URINE: 77 MG/DL (ref 29–226)
EOSINOPHILS ABSOLUTE: 0.19 E9/L (ref 0.05–0.5)
EOSINOPHILS RELATIVE PERCENT: 4.5 % (ref 0–6)
GFR AFRICAN AMERICAN: >60
GFR NON-AFRICAN AMERICAN: >60 ML/MIN/1.73
GLUCOSE BLD-MCNC: 93 MG/DL (ref 74–99)
HBA1C MFR BLD: 6 % (ref 4–5.6)
HCT VFR BLD CALC: 37.1 % (ref 34–48)
HDLC SERPL-MCNC: 68 MG/DL
HDLC SERPL-MCNC: 69 MG/DL
HEMOGLOBIN: 12.2 G/DL (ref 11.5–15.5)
IMMATURE GRANULOCYTES #: 0 E9/L
IMMATURE GRANULOCYTES %: 0 % (ref 0–5)
LDL CHOLESTEROL CALCULATED: 239 MG/DL (ref 0–99)
LDL CHOLESTEROL CALCULATED: 242 MG/DL (ref 0–99)
LYMPHOCYTES ABSOLUTE: 1.89 E9/L (ref 1.5–4)
LYMPHOCYTES RELATIVE PERCENT: 45.1 % (ref 20–42)
MCH RBC QN AUTO: 31.9 PG (ref 26–35)
MCHC RBC AUTO-ENTMCNC: 32.9 % (ref 32–34.5)
MCV RBC AUTO: 96.9 FL (ref 80–99.9)
MICROALBUMIN UR-MCNC: <12 MG/L
MICROALBUMIN/CREAT UR-RTO: ABNORMAL (ref 0–30)
MONOCYTES ABSOLUTE: 0.38 E9/L (ref 0.1–0.95)
MONOCYTES RELATIVE PERCENT: 9.1 % (ref 2–12)
NEUTROPHILS ABSOLUTE: 1.71 E9/L (ref 1.8–7.3)
NEUTROPHILS RELATIVE PERCENT: 40.8 % (ref 43–80)
PARATHYROID HORMONE INTACT: 51 PG/ML (ref 15–65)
PDW BLD-RTO: 13.9 FL (ref 11.5–15)
PHOSPHORUS: 3.2 MG/DL (ref 2.5–4.5)
PLATELET # BLD: 345 E9/L (ref 130–450)
PMV BLD AUTO: 9.3 FL (ref 7–12)
POTASSIUM SERPL-SCNC: 4 MMOL/L (ref 3.5–5)
RBC # BLD: 3.83 E12/L (ref 3.5–5.5)
SODIUM BLD-SCNC: 140 MMOL/L (ref 132–146)
T3 FREE: 3 PG/ML (ref 2–4.4)
T4 FREE: 0.88 NG/DL (ref 0.93–1.7)
TOTAL PROTEIN: 7.1 G/DL (ref 6.4–8.3)
TRIGL SERPL-MCNC: 101 MG/DL (ref 0–149)
TRIGL SERPL-MCNC: 101 MG/DL (ref 0–149)
TSH SERPL DL<=0.05 MIU/L-ACNC: 10.13 UIU/ML (ref 0.27–4.2)
VITAMIN D 25-HYDROXY: 38 NG/ML (ref 30–100)
VLDLC SERPL CALC-MCNC: 20 MG/DL
VLDLC SERPL CALC-MCNC: 20 MG/DL
WBC # BLD: 4.2 E9/L (ref 4.5–11.5)

## 2022-09-30 PROCEDURE — 82570 ASSAY OF URINE CREATININE: CPT

## 2022-09-30 PROCEDURE — 82306 VITAMIN D 25 HYDROXY: CPT

## 2022-09-30 PROCEDURE — 83036 HEMOGLOBIN GLYCOSYLATED A1C: CPT

## 2022-09-30 PROCEDURE — 84439 ASSAY OF FREE THYROXINE: CPT

## 2022-09-30 PROCEDURE — 82044 UR ALBUMIN SEMIQUANTITATIVE: CPT

## 2022-09-30 PROCEDURE — 83970 ASSAY OF PARATHORMONE: CPT

## 2022-09-30 PROCEDURE — 84100 ASSAY OF PHOSPHORUS: CPT

## 2022-09-30 PROCEDURE — 85025 COMPLETE CBC W/AUTO DIFF WBC: CPT

## 2022-09-30 PROCEDURE — 80061 LIPID PANEL: CPT

## 2022-09-30 PROCEDURE — 80053 COMPREHEN METABOLIC PANEL: CPT

## 2022-09-30 PROCEDURE — 36415 COLL VENOUS BLD VENIPUNCTURE: CPT

## 2022-09-30 PROCEDURE — 82330 ASSAY OF CALCIUM: CPT

## 2022-09-30 PROCEDURE — 84443 ASSAY THYROID STIM HORMONE: CPT

## 2022-09-30 PROCEDURE — 84481 FREE ASSAY (FT-3): CPT

## 2022-10-07 ENCOUNTER — OFFICE VISIT (OUTPATIENT)
Dept: FAMILY MEDICINE CLINIC | Age: 67
End: 2022-10-07
Payer: MEDICARE

## 2022-10-07 VITALS
HEIGHT: 67 IN | DIASTOLIC BLOOD PRESSURE: 78 MMHG | HEART RATE: 86 BPM | WEIGHT: 203 LBS | SYSTOLIC BLOOD PRESSURE: 128 MMHG | BODY MASS INDEX: 31.86 KG/M2 | TEMPERATURE: 98.5 F | RESPIRATION RATE: 20 BRPM | OXYGEN SATURATION: 98 %

## 2022-10-07 DIAGNOSIS — M54.41 ACUTE RIGHT-SIDED LOW BACK PAIN WITH RIGHT-SIDED SCIATICA: Primary | ICD-10-CM

## 2022-10-07 LAB
BILIRUBIN, POC: NORMAL
BLOOD URINE, POC: NORMAL
CLARITY, POC: CLEAR
COLOR, POC: YELLOW
GLUCOSE URINE, POC: NORMAL
KETONES, POC: NORMAL
LEUKOCYTE EST, POC: NORMAL
NITRITE, POC: NORMAL
PH, POC: 6.5
PROTEIN, POC: NORMAL
SPECIFIC GRAVITY, POC: 1.02
UROBILINOGEN, POC: NORMAL

## 2022-10-07 PROCEDURE — G8484 FLU IMMUNIZE NO ADMIN: HCPCS

## 2022-10-07 PROCEDURE — 3017F COLORECTAL CA SCREEN DOC REV: CPT

## 2022-10-07 PROCEDURE — G8417 CALC BMI ABV UP PARAM F/U: HCPCS

## 2022-10-07 PROCEDURE — 99213 OFFICE O/P EST LOW 20 MIN: CPT

## 2022-10-07 PROCEDURE — G8399 PT W/DXA RESULTS DOCUMENT: HCPCS

## 2022-10-07 PROCEDURE — 1036F TOBACCO NON-USER: CPT

## 2022-10-07 PROCEDURE — G8427 DOCREV CUR MEDS BY ELIG CLIN: HCPCS

## 2022-10-07 PROCEDURE — 1090F PRES/ABSN URINE INCON ASSESS: CPT

## 2022-10-07 PROCEDURE — 81002 URINALYSIS NONAUTO W/O SCOPE: CPT

## 2022-10-07 PROCEDURE — 1123F ACP DISCUSS/DSCN MKR DOCD: CPT

## 2022-10-07 RX ORDER — TIZANIDINE 2 MG/1
2 TABLET ORAL NIGHTLY PRN
Qty: 10 TABLET | Refills: 0 | Status: SHIPPED | OUTPATIENT
Start: 2022-10-07

## 2022-10-07 RX ORDER — METHYLPREDNISOLONE 4 MG/1
TABLET ORAL
Qty: 1 KIT | Refills: 0 | Status: SHIPPED
Start: 2022-10-07 | End: 2022-10-25 | Stop reason: ALTCHOICE

## 2022-10-07 NOTE — PROGRESS NOTES
Chief Complaint   Lower Back Pain (Rt lower back pain x 1 week / no known injury)    History of Present Illness   Source of history provided by:  patient. Sariah Velez is a 74 y.o. old female presenting to the walk in clinic for evaluation of right lower back pain for the past 7 days. Pt states she did have a fall at yoga class landing on her right hip/buttock region. Pt has had back problems in the past with sciatica. Pt states the pain is worse with movement and improves with lying flat. There is radiation of the pain into the buttocks/leg of the right side. Pt denies any bowel/bladder incontinence, abdominal pain, hematuria, N/V/D, fever, chills, HA, neck pain, recent illness, dysuria, or lethargy. Pt reports taking acetaminophen for pain with minimal relief. Pt has had some relief with Ibuprofen and ice. ROS    Unless otherwise stated in this report or unable to obtain because of the patient's clinical or mental status as evidenced by the medical record, this patients's positive and negative responses for Review of Systems, constitutional, psych, eyes, ENT, cardiovascular, respiratory, gastrointestinal, neurological, genitourinary, musculoskeletal, integument systems and systems related to the presenting problem are either stated in the preceding or were not pertinent or were negative for the symptoms and/or complaints related to the medical problem. Physical Exam         VS:  /78   Pulse 86   Temp 98.5 °F (36.9 °C) (Temporal)   Resp 20   Ht 5' 7\" (1.702 m)   Wt 203 lb (92.1 kg)   SpO2 98%   BMI 31.79 kg/m²    Oxygen Saturation Interpretation: Normal.    Constitutional:  Alert, development consistent with age. Neck:  Normal ROM. Supple. No TTP. Lungs:  Clear to auscultation and breath sounds equal.  Heart:  Regular rate and rhythm, normal heart sounds, without pathological murmurs, ectopy, gallops, or rubs. Abdomen:  Soft, nontender, good bowel sounds.   No firm or pulsatile mass.  Back: Tenderness: Mild TTP to right lower lumbar region/paraspinal muscles of right lower back into right buttock with no appreciable midline tenderness. Swelling: No edema. Range of Motion: Normal lateral and front to back ROM due to pain. CVA Tenderness: No CVA tenderness bilaterally. Straight leg raising:  Negative straight leg raise. Skin:  No bruising, redness, abrasions, or rashes. Distal Function:              Motor deficit: Strength 5/5 in LE's bilaterally. Sensory deficit: Distal sensation intact. Pulse deficit: Distal pulses 2+ and bounding. Calf Tenderness:  No bilateral calf tenderness. Negative Kaitlin's sign bilaterally               Reflexes: Intact at knee and achilles bilaterally. Gait:  No antalgia noted. Skin:  Normal turgor. Warm, dry, without visible rash. Neurological:  Alert and oriented. Motor functions intact. Lab / Imaging Results   (All laboratory and radiology results have been personally reviewed by myself)  Labs:  Results for orders placed or performed in visit on 10/07/22   POCT Urinalysis no Micro   Result Value Ref Range    Color, UA yellow     Clarity, UA clear     Glucose, UA POC neg     Bilirubin, UA neg     Ketones, UA neg     Spec Grav, UA 1.020     Blood, UA POC trace     pH, UA 6.5     Protein, UA POC neg     Urobilinogen, UA 0.2 E.U./dl     Leukocytes, UA neg     Nitrite, UA neg        Imaging: All Radiology results interpreted by Radiologist unless otherwise noted. Assessment / Plan     Impression(s):  Kamilah Sawyer was seen today for lower back pain. Diagnoses and all orders for this visit:    Acute right-sided low back pain with right-sided sciatica  -     POCT Urinalysis no Micro  -     methylPREDNISolone (MEDROL DOSEPACK) 4 MG tablet; Take by mouth. -     tiZANidine (ZANAFLEX) 2 MG tablet;  Take 1 tablet by mouth nightly as needed (muscle spasm)      Disposition:  Disposition: Discharge to home. Pt offered and recommended XR evaluation but she declined at this time and would like to try treatment first.     Scripts written for dose pack and zanaflex, side effects discussed. Advise rest, ice, and/or moist heat for additional relief. PCP in 1-2 weeks if symptoms persist. ED sooner if symptoms worsen or change. ED immediately with any fever, severe or worsening back pain, paresthesias, weakness, or GI/ incontinence. Pt states understanding and is in agreement with this care plan. All questions answered. GABRIELLA Sethi    **This report was transcribed using voice recognition software. Every effort was made to ensure accuracy; however, inadvertent computerized transcription errors may be present.

## 2022-10-14 DIAGNOSIS — M54.41 ACUTE RIGHT-SIDED LOW BACK PAIN WITH RIGHT-SIDED SCIATICA: ICD-10-CM

## 2022-10-14 RX ORDER — TIZANIDINE 2 MG/1
TABLET ORAL
Qty: 10 TABLET | Refills: 0 | OUTPATIENT
Start: 2022-10-14

## 2022-10-25 ENCOUNTER — OFFICE VISIT (OUTPATIENT)
Dept: FAMILY MEDICINE CLINIC | Age: 67
End: 2022-10-25
Payer: MEDICARE

## 2022-10-25 VITALS
RESPIRATION RATE: 18 BRPM | WEIGHT: 204 LBS | SYSTOLIC BLOOD PRESSURE: 160 MMHG | DIASTOLIC BLOOD PRESSURE: 85 MMHG | BODY MASS INDEX: 32.02 KG/M2 | OXYGEN SATURATION: 98 % | HEIGHT: 67 IN | HEART RATE: 61 BPM

## 2022-10-25 DIAGNOSIS — Z00.00 MEDICARE ANNUAL WELLNESS VISIT, SUBSEQUENT: Primary | ICD-10-CM

## 2022-10-25 DIAGNOSIS — Z91.81 AT RISK FOR FALLS: ICD-10-CM

## 2022-10-25 DIAGNOSIS — M53.3 SACROILIAC JOINT PAIN: ICD-10-CM

## 2022-10-25 DIAGNOSIS — I10 UNCONTROLLED HYPERTENSION: ICD-10-CM

## 2022-10-25 PROCEDURE — 3078F DIAST BP <80 MM HG: CPT | Performed by: FAMILY MEDICINE

## 2022-10-25 PROCEDURE — 3017F COLORECTAL CA SCREEN DOC REV: CPT | Performed by: FAMILY MEDICINE

## 2022-10-25 PROCEDURE — 1123F ACP DISCUSS/DSCN MKR DOCD: CPT | Performed by: FAMILY MEDICINE

## 2022-10-25 PROCEDURE — G8484 FLU IMMUNIZE NO ADMIN: HCPCS | Performed by: FAMILY MEDICINE

## 2022-10-25 PROCEDURE — G0439 PPPS, SUBSEQ VISIT: HCPCS | Performed by: FAMILY MEDICINE

## 2022-10-25 PROCEDURE — 3074F SYST BP LT 130 MM HG: CPT | Performed by: FAMILY MEDICINE

## 2022-10-25 RX ORDER — LEVOTHYROXINE SODIUM 50 MCG
TABLET ORAL
COMMUNITY
Start: 2022-10-12

## 2022-10-25 ASSESSMENT — LIFESTYLE VARIABLES
HOW OFTEN DO YOU HAVE A DRINK CONTAINING ALCOHOL: NEVER
HOW MANY STANDARD DRINKS CONTAINING ALCOHOL DO YOU HAVE ON A TYPICAL DAY: PATIENT DOES NOT DRINK

## 2022-10-25 ASSESSMENT — PATIENT HEALTH QUESTIONNAIRE - PHQ9
SUM OF ALL RESPONSES TO PHQ QUESTIONS 1-9: 0
2. FEELING DOWN, DEPRESSED OR HOPELESS: 0
SUM OF ALL RESPONSES TO PHQ QUESTIONS 1-9: 0
SUM OF ALL RESPONSES TO PHQ QUESTIONS 1-9: 0
DEPRESSION UNABLE TO ASSESS: FUNCTIONAL CAPACITY MOTIVATION LIMITS ACCURACY
SUM OF ALL RESPONSES TO PHQ QUESTIONS 1-9: 0
SUM OF ALL RESPONSES TO PHQ9 QUESTIONS 1 & 2: 0
1. LITTLE INTEREST OR PLEASURE IN DOING THINGS: 0

## 2022-10-25 NOTE — PROGRESS NOTES
[facility]    Santos Blake is here for Medicare AWV    Assessment & Plan   Medicare annual wellness visit, subsequent  Sacroiliac joint pain  -     External Referral To Physical Therapy  At risk for falls  -     External Referral To Physical Therapy  Uncontrolled hypertension  Take chlorthalidone daily and tekturna daily  Dash diet  F/u for [place] in 2 weeks  She would like to discuss with Dr Benji Sam - very concerned that any medication would be harmful to her. Short term f/u    Recommendations for Preventive Services Due: see orders and patient instructions/AVS.  Recommended screening schedule for the next 5-10 years is provided to the patient in written form: see Patient Instructions/AVS.     Return for Medicare Annual Wellness Visit in 1 year. Subjective     Had been to the eye doctor  Now taking a drop in right eye  Regarding b p she states it is always high here. Reviewed trends-- this is higher than typical. She has not been taking clorthalidone regularly. She has a floater. She was doing chair yoga  She puts she injured her right back it was the first time she had been doing yoga  No other balance issues    Today symptoms are about 5/10  No numbness/ tinglign in leg  No leg weakness  Chronic knee pain. Patient's complete Health Risk Assessment and screening values have been reviewed and are found in Flowsheets. The following problems were reviewed today and where indicated follow up appointments were made and/or referrals ordered.     Positive Risk Factor Screenings with Interventions:    Fall Risk:  Do you feel unsteady or are you worried about falling? : no  2 or more falls in past year?: no  Fall with injury in past year?: (!) yes   Fall Risk Interventions:    Physical therapy referral ordered for strength and balance training     Depression:  Depression Unable to Assess: Functional capacity motivation limits accuracy  PHQ-2 Score: 0  PHQ-9 Total Score: 0    Severity:1-4 = minimal depression, 5-9 = mild depression, 10-14 = moderate depression, 15-19 = moderately severe depression, 20-27 = severe depression          General Health and ACP:  General  In general, how would you say your health is?: Very Good  In the past 7 days, have you experienced any of the following: New or Increased Pain, New or Increased Fatigue, Loneliness, Social Isolation, Stress or Anger?: (!) Yes  Select all that apply: (!) New or Increased Pain  Do you get the social and emotional support that you need?: Yes  Do you have a Living Will?: Yes    Advance Directives       Power of  Living Will ACP-Advance Directive ACP-Power of     Not on File [place] on 05/10/13 Filed [id number] Risk Interventions:  Pain issues: physical therapy referral ordered  Note bereavement. Support noted. Insomnia tips reviewed    Health Habits/Nutrition:  Physical Activity: Insufficiently Active    Days of Exercise per Week: 3 days    Minutes of Exercise per Session: 30 min     Have you lost any weight without trying in the past 3 months?: No  Body mass index: (!) 31.95  Have you seen the dentist within the past year?: (!) No  Health Habits/Nutrition Interventions:  Dental exam overdue:  patient encouraged to make appointment with his/her dentist             Objective   Vitals:    10/25/22 1408 10/25/22 1424   BP: (!) 170/88 (!) 160/85   Pulse: 61    Resp: 18    SpO2: 98%    Weight: 204 lb (92.5 kg)    Height: 5' 7\" (1.702 m)       Body mass index is 31.95 kg/m². Pain over left SI joint and right si joint. Pain only with straight leg test on right. Intact ROM bilateral hips. No vertebral body tenderness  Strength bilateral lower extremities intact 5/5   Mood - mildly aggitated, tearful when discussing her late . Good insight / judgement.    Periorbital puffiness and hyperpigmentation   Regular rate and rhythm , no cardiac mumurs  Lungs ctab  Unable to perform tandem stand >10 seconds  Normal get up and go test  Able to perform semitandem stand for 10 seconds        Allergies   Allergen Reactions    Seasonal      Congestion, cough itchy watery eyes    Penicillins Rash     Prior to Visit Medications    Medication Sig Taking? Authorizing Provider   SYNTHROID 50 MCG tablet TAKE 1 TABLET BY MOUTH EVERY DAY Yes Historical Provider, MD   tiZANidine (ZANAFLEX) 2 MG tablet Take 1 tablet by mouth nightly as needed (muscle spasm) Yes GABRIELLA Lal   Handicap Placard MISC Expiration 8/2025 Yes Paulino Royal MD   chlorthalidone (HYGROTON) 25 MG tablet TAKE 1 TABLET BY MOUTH DAILY Yes Paulino Royal MD   blood glucose test strips (ONETOUCH VERIO) strip TEST ONCE DAILY AND AS NEEDED FOR SYMPTOMS OF IRREGULAR BLOOD GLUCOSE Yes Paulino Royla MD   Lancets (Offer[employer] Petrin PLUS [id number]) [address] [facility] TEST EVERY DAY AS DIRECTED Yes Paulino Royal MD   REPATHARDHA SURECLICK 917 MG/ML SOAJ INJECT 1 ML UNDER THE SKIN EVERY 2 WEEKS AS DIRECTED Yes Historical Provider, MD   levocetirizine (XYZAL) 5 MG tablet TAKE 1 TABLET BY MOUTH EVERY NIGHT AT BEDTIME Yes Paulino Royal MD   fluticasone (FLONASE) 50 MCG/ACT nasal spray SHAKE LIQUID AND USE 1 SPRAY IN EACH NOSTRIL EVERY DAY AS NEEDED Yes Paulino Royal MD   tiotropium (SPIRIVA RESPIMAT) 1.25 MCG/ACT AERS inhaler Inhale 2 puffs into the lungs daily Yes Paulino Royal MD   Alcohol Swabs (B-D SINGLE USE SWABS REGULAR) PADS USE AS DIRECTED Yes Paulino Royal MD   polyethylene glycol (GLYCOLAX) 17 GM/SCOOP powder MIX 1 CAPFUL(17GM) IN A BEVERAGE AND DRINK EVERY DAY Yes Paulino Royal MD   Cholecalciferol (VITAMIN D3) 2000 units CAPS TK 1 C PO QD Yes Historical Provider, MD   ketotifen (ZADITOR) 0.025 % ophthalmic solution INT 1 GTT IN OU BID PRF ALLERGIES Yes Historical Provider, MD   aliskiren (TEKTURNA) 300 MG tablet Take 300 mg by mouth daily.  Yes Historical Provider, MD Bond (Including outside providers/suppliers regularly involved in providing care):   Patient Care Team:  Masha Hoover MD as PCP - Ty Nathan MD as PCP - [facility] Empaneled Provider  Letha Delgado MD as Consulting Physician (Nephrology)     Reviewed and updated this visit:  Tobacco  Allergies  Meds  Problems  Med Hx  Surg Hx  Soc Hx  Fam Hx

## 2022-10-25 NOTE — PATIENT INSTRUCTIONS
Personalized Preventive Plan for Art Duncan - 10/25/2022  Medicare offers a range of preventive health benefits. Some of the tests and screenings are paid in full while other may be subject to a deductible, co-insurance, and/or copay. Some of these benefits include a comprehensive review of your medical history including lifestyle, illnesses that may run in your family, and various assessments and screenings as appropriate. After reviewing your medical record and screening and assessments performed today your provider may have ordered immunizations, labs, imaging, and/or referrals for you. A list of these orders (if applicable) as well as your Preventive Care list are included within your After Visit Summary for your review. Other Preventive Recommendations:    A preventive eye exam performed by an eye specialist is recommended every 1-2 years to screen for glaucoma; cataracts, macular degeneration, and other eye disorders. A preventive dental visit is recommended every 6 months. Try to get at least 150 minutes of exercise per week or 10,000 steps per day on a pedometer . Order or download the FREE \"Exercise & Physical Activity: Your Everyday Guide\" from [employer]. Call 1-476.947.2281 or search [employer] online. You need 3534-3897 mg of calcium and 9231-0770 IU of vitamin D per day. It is possible to meet your calcium requirement with diet alone, but a vitamin D supplement is usually necessary to meet this goal.  When exposed to the sun, use a sunscreen that protects against both UVA and UVB radiation with an SPF of 30 or greater. Reapply every 2 to 3 hours or after sweating, drying off with a towel, or swimming. Always wear a seat belt when traveling in a car. Always wear a helmet when riding a bicycle or motorcycle.

## 2022-11-09 ENCOUNTER — NURSE ONLY (OUTPATIENT)
Dept: FAMILY MEDICINE CLINIC | Age: 67
End: 2022-11-09

## 2022-11-09 VITALS — DIASTOLIC BLOOD PRESSURE: 78 MMHG | SYSTOLIC BLOOD PRESSURE: 138 MMHG

## 2022-11-09 NOTE — PROGRESS NOTES
Patient here for Blood Pressure Check  Vitals:    11/09/22 0918   BP: 138/78       If CHANGES are needed please open this encounter, place orders or make changes as needed and route back to the clinical pool.

## 2022-12-09 ENCOUNTER — TELEPHONE (OUTPATIENT)
Dept: FAMILY MEDICINE CLINIC | Age: 67
End: 2022-12-09

## 2022-12-09 DIAGNOSIS — F40.243 FEAR OF FLYING: ICD-10-CM

## 2022-12-09 DIAGNOSIS — F41.9 ANXIETY: Primary | ICD-10-CM

## 2022-12-09 NOTE — TELEPHONE ENCOUNTER
Patient is flying next Thursday, you previously prescribed her something to help with the anxiety, can not remember the name send to [facility]

## 2022-12-10 ENCOUNTER — HOSPITAL ENCOUNTER (OUTPATIENT)
Age: 67
Discharge: HOME | End: 2022-12-10

## 2022-12-10 LAB
ALBUMIN SERPL-MCNC: 4.5 G/DL (ref 3.5–5.2)
ALP BLD-CCNC: 157 U/L (ref 35–104)
ALT SERPL-CCNC: 15 U/L (ref 0–32)
ANION GAP SERPL CALCULATED.3IONS-SCNC: 10 MMOL/L (ref 7–16)
AST SERPL-CCNC: 21 U/L (ref 0–31)
BILIRUB SERPL-MCNC: 0.3 MG/DL (ref 0–1.2)
BUN BLDV-MCNC: 24 MG/DL (ref 6–23)
CALCIUM SERPL-MCNC: 10.3 MG/DL (ref 8.6–10.2)
CHLORIDE BLD-SCNC: 101 MMOL/L (ref 98–107)
CHOLESTEROL, TOTAL: 245 MG/DL (ref 0–199)
CO2: 29 MMOL/L (ref 22–29)
CREAT SERPL-MCNC: 0.8 MG/DL (ref 0.5–1)
GFR SERPL CREATININE-BSD FRML MDRD: >60 ML/MIN/1.73
GLUCOSE BLD-MCNC: 116 MG/DL (ref 74–99)
HDLC SERPL-MCNC: 69 MG/DL
LDL CHOLESTEROL CALCULATED: 164 MG/DL (ref 0–99)
POTASSIUM SERPL-SCNC: 4 MMOL/L (ref 3.5–5)
SODIUM BLD-SCNC: 140 MMOL/L (ref 132–146)
TOTAL PROTEIN: 7.3 G/DL (ref 6.4–8.3)
TRIGL SERPL-MCNC: 58 MG/DL (ref 0–149)
TSH SERPL DL<=0.05 MIU/L-ACNC: 7 UIU/ML (ref 0.27–4.2)
VLDLC SERPL CALC-MCNC: 12 MG/DL

## 2022-12-11 LAB
Lab: NORMAL
T4 FREE: 0.89 NG/DL (ref 0.93–1.7)
THIS TEST SENT TO: NORMAL

## 2022-12-14 RX ORDER — LORAZEPAM 0.5 MG/1
0.5 TABLET ORAL
Qty: 2 TABLET | Refills: 0 | Status: SHIPPED | OUTPATIENT
Start: 2022-12-14 | End: 2022-12-14

## 2023-02-25 DIAGNOSIS — I10 ESSENTIAL HYPERTENSION: Chronic | ICD-10-CM

## 2023-02-28 RX ORDER — CHLORTHALIDONE 25 MG/1
25 TABLET ORAL DAILY
Qty: 90 TABLET | Refills: 0 | Status: SHIPPED | OUTPATIENT
Start: 2023-02-28

## 2023-03-02 ENCOUNTER — OFFICE VISIT (OUTPATIENT)
Dept: FAMILY MEDICINE CLINIC | Age: 68
End: 2023-03-02
Payer: MEDICARE

## 2023-03-02 VITALS
HEIGHT: 67 IN | OXYGEN SATURATION: 98 % | BODY MASS INDEX: 31.55 KG/M2 | DIASTOLIC BLOOD PRESSURE: 80 MMHG | HEART RATE: 78 BPM | SYSTOLIC BLOOD PRESSURE: 132 MMHG | RESPIRATION RATE: 18 BRPM | WEIGHT: 201 LBS

## 2023-03-02 DIAGNOSIS — I10 ESSENTIAL HYPERTENSION: ICD-10-CM

## 2023-03-02 DIAGNOSIS — L84 CALLUS OF HEEL: ICD-10-CM

## 2023-03-02 DIAGNOSIS — J30.9 ALLERGIC RHINITIS, UNSPECIFIED SEASONALITY, UNSPECIFIED TRIGGER: ICD-10-CM

## 2023-03-02 DIAGNOSIS — E11.9 CONTROLLED TYPE 2 DIABETES MELLITUS WITHOUT COMPLICATION, WITHOUT LONG-TERM CURRENT USE OF INSULIN (HCC): Primary | ICD-10-CM

## 2023-03-02 PROCEDURE — 3075F SYST BP GE 130 - 139MM HG: CPT | Performed by: FAMILY MEDICINE

## 2023-03-02 PROCEDURE — 3017F COLORECTAL CA SCREEN DOC REV: CPT | Performed by: FAMILY MEDICINE

## 2023-03-02 PROCEDURE — G8417 CALC BMI ABV UP PARAM F/U: HCPCS | Performed by: FAMILY MEDICINE

## 2023-03-02 PROCEDURE — 99213 OFFICE O/P EST LOW 20 MIN: CPT | Performed by: FAMILY MEDICINE

## 2023-03-02 PROCEDURE — 1123F ACP DISCUSS/DSCN MKR DOCD: CPT | Performed by: FAMILY MEDICINE

## 2023-03-02 PROCEDURE — 2022F DILAT RTA XM EVC RTNOPTHY: CPT | Performed by: FAMILY MEDICINE

## 2023-03-02 PROCEDURE — 1090F PRES/ABSN URINE INCON ASSESS: CPT | Performed by: FAMILY MEDICINE

## 2023-03-02 PROCEDURE — G8484 FLU IMMUNIZE NO ADMIN: HCPCS | Performed by: FAMILY MEDICINE

## 2023-03-02 PROCEDURE — 3046F HEMOGLOBIN A1C LEVEL >9.0%: CPT | Performed by: FAMILY MEDICINE

## 2023-03-02 PROCEDURE — G8427 DOCREV CUR MEDS BY ELIG CLIN: HCPCS | Performed by: FAMILY MEDICINE

## 2023-03-02 PROCEDURE — G8399 PT W/DXA RESULTS DOCUMENT: HCPCS | Performed by: FAMILY MEDICINE

## 2023-03-02 PROCEDURE — 1036F TOBACCO NON-USER: CPT | Performed by: FAMILY MEDICINE

## 2023-03-02 PROCEDURE — 3079F DIAST BP 80-89 MM HG: CPT | Performed by: FAMILY MEDICINE

## 2023-03-02 RX ORDER — LEVOTHYROXINE SODIUM 0.07 MG/1
75 TABLET ORAL DAILY
Qty: 90 TABLET | Refills: 1 | Status: SHIPPED | OUTPATIENT
Start: 2023-03-02

## 2023-03-02 RX ORDER — AZELASTINE 1 MG/ML
1 SPRAY, METERED NASAL 2 TIMES DAILY
Qty: 60 ML | Refills: 1 | Status: SHIPPED | OUTPATIENT
Start: 2023-03-02

## 2023-03-02 RX ORDER — AMMONIUM LACTATE 12 G/100G
CREAM TOPICAL
Qty: 1 EACH | Refills: 5 | Status: SHIPPED | OUTPATIENT
Start: 2023-03-02 | End: 2023-04-01

## 2023-03-02 RX ORDER — FLUTICASONE PROPIONATE 50 MCG
SPRAY, SUSPENSION (ML) NASAL
Qty: 16 G | Refills: 5 | Status: SHIPPED | OUTPATIENT
Start: 2023-03-02

## 2023-03-02 ASSESSMENT — PATIENT HEALTH QUESTIONNAIRE - PHQ9
SUM OF ALL RESPONSES TO PHQ QUESTIONS 1-9: 0
SUM OF ALL RESPONSES TO PHQ9 QUESTIONS 1 & 2: 0
SUM OF ALL RESPONSES TO PHQ QUESTIONS 1-9: 0
1. LITTLE INTEREST OR PLEASURE IN DOING THINGS: 0
SUM OF ALL RESPONSES TO PHQ QUESTIONS 1-9: 0
DEPRESSION UNABLE TO ASSESS: FUNCTIONAL CAPACITY MOTIVATION LIMITS ACCURACY
2. FEELING DOWN, DEPRESSED OR HOPELESS: 0
SUM OF ALL RESPONSES TO PHQ QUESTIONS 1-9: 0

## 2023-03-02 NOTE — PROGRESS NOTES
3/2/2023    Reji Osman is a 69 y.o. female here for   Chief Complaint   Patient presents with    Hypertension     Regarding hypertension. Patient is  monitoring home blood pressures. Cardiovascular risk factors: advanced age (older than 54 for men, 72 for women), dyslipidemia, hypertension, and obesity (BMI >= 30 kg/m2). Patient does not smoke. Currently on tekturna and chlorthalidone. Taking as prescribed. No adverse effects. Today,  BP: 132/80 and she is asymptomatic. BP Readings from Last 3 Encounters:   03/02/23 132/80   11/09/22 138/78   10/25/22 (!) 160/85     Patient denies chest pain, diaphoresis, dyspnea, dyspnea on exertion, peripheral edema, palpitations, headache, vision changes. She has been taking repatha but believes that it is causing her knee pain. Has has joint line pain in both knees. Partially relieved by weekly yoga and regular exercises. Her endocriologist will be moving out of area which she is frustrated with. Wt Readings from Last 3 Encounters:   03/02/23 201 lb (91.2 kg)   10/25/22 204 lb (92.5 kg)   10/07/22 203 lb (92.1 kg)       She  reports that she has never smoked. She has never used smokeless tobacco.    Medications and allergies reviewed and updated in chart.     Current Outpatient Medications   Medication Sig Dispense Refill    chlorthalidone (HYGROTON) 25 MG tablet TAKE 1 TABLET BY MOUTH DAILY 90 tablet 0    SYNTHROID 50 MCG tablet TAKE 1 TABLET BY MOUTH EVERY DAY      Handicap Placard MISC Expiration 8/2025 1 each 0    blood glucose test strips (ONETOUCH VERIO) strip TEST ONCE DAILY AND AS NEEDED FOR SYMPTOMS OF IRREGULAR BLOOD GLUCOSE 100 strip 5    Lancets (ONETOUCH DELICA PLUS [id number]) MISC TEST EVERY DAY AS DIRECTED 100 each 5    REPATHA SURECLICK 471 MG/ML SOAJ INJECT 1 ML UNDER THE SKIN EVERY 2 WEEKS AS DIRECTED      levocetirizine (XYZAL) 5 MG tablet TAKE 1 TABLET BY MOUTH EVERY NIGHT AT BEDTIME 90 tablet 3    fluticasone (FLONASE) 50 MCG/ACT nasal spray SHAKE LIQUID AND USE 1 SPRAY IN EACH NOSTRIL EVERY DAY AS NEEDED 16 g 5    Alcohol Swabs (B-D SINGLE USE SWABS REGULAR) PADS USE AS DIRECTED 100 each 11    aliskiren (TEKTURNA) 300 MG tablet Take 300 mg by mouth daily.      tiZANidine (ZANAFLEX) 2 MG tablet Take 1 tablet by mouth nightly as needed (muscle spasm) 10 tablet 0    tiotropium (SPIRIVA RESPIMAT) 1.25 MCG/ACT AERS inhaler Inhale 2 puffs into the lungs daily (Patient not taking: Reported on 3/2/2023) 1 Inhaler 5    polyethylene glycol (GLYCOLAX) 17 GM/SCOOP powder MIX 1 CAPFUL(17GM) IN A BEVERAGE AND DRINK EVERY DAY (Patient not taking: Reported on 3/2/2023) 510 g 11    Cholecalciferol (VITAMIN D3) 2000 units CAPS TK 1 C PO QD (Patient not taking: Reported on 3/2/2023)  5    ketotifen (ZADITOR) 0.025 % ophthalmic solution INT 1 GTT IN OU BID PRF ALLERGIES (Patient not taking: Reported on 3/2/2023)  5     No current facility-administered medications for this visit.        Patient'spast medical, surgical, social and/or family history reviewed, updated in chart, and are non-contributory (unless otherwise stated).      Review of Systems  Review of Systems    PE:  VS:  /80   Pulse 78   Resp 18   Ht 5' 7\" (1.702 m)   Wt 201 lb (91.2 kg)   SpO2 98%   BMI 31.48 kg/m²   Physical Exam  Constitutional:       Appearance: She is well-developed.   HENT:      Head: Normocephalic and atraumatic.      Comments: Periorbital hyperpigementation  Cardiovascular:      Rate and Rhythm: Normal rate and regular rhythm.      Heart sounds: No murmur heard.    No friction rub. No gallop.   Pulmonary:      Effort: Pulmonary effort is normal.      Breath sounds: Normal breath sounds. No wheezing or rales.   Skin:     General: Skin is warm and dry.   Neurological:      General: No focal deficit present.      Mental Status: She is alert and oriented to person, place, and time.     Visual inspection:  Deformity/amputation: absent  Skin lesions/pre-ulcerative  calluses: present - heel calluses  Edema: right- trace, left- trace    Sensory exam:  Monofilament sensation: normal  (minimum of 5 random plantar locations tested, avoiding callused areas - > 1 area with absence of sensation is + for neuropathy)    Plus at least one of the following:  Pulses: normal,   Pinprick: N/A  Proprioception: Intact  Vibration (128 Hz): N/A    Assessment/Plan:  Holger Vallecillo was seen today for hypertension. Diagnoses and all orders for this visit:    Controlled type 2 diabetes mellitus without complication, without long-term current use of insulin (HCC)  Diet controlled    Callus of heel  Reviewed footcare  -     ammonium lactate (LAC-HYDRIN) 12 % cream; Apply topically as needed. Essential hypertension  Bp at goal on current meds    Allergic rhinitis, unspecified seasonality, unspecified trigger  Symptomatic   Not using flonase because concerned that it raises blood pressures  Reviewed proper use / technique of nasal spary  -     fluticasone (FLONASE) 50 MCG/ACT nasal spray; SHAKE LIQUID AND USE 1 SPRAY IN EACH NOSTRIL EVERY DAY AS NEEDED  -     azelastine (ASTELIN) 0.1 % nasal spray; 1 spray by Nasal route 2 times daily Use in each nostril as directed    Med refills  -     levothyroxine (SYNTHROID) 75 MCG tablet; Take 1 tablet by mouth daily      Return for 4-6 months htn. Advised patient to call with any new medication issues. All questions answered.   Call or go to emergency department ifsymptoms worsen or persist.

## 2023-03-31 ENCOUNTER — OFFICE VISIT (OUTPATIENT)
Dept: FAMILY MEDICINE CLINIC | Age: 68
End: 2023-03-31
Payer: MEDICARE

## 2023-03-31 VITALS
WEIGHT: 203 LBS | HEIGHT: 67 IN | OXYGEN SATURATION: 95 % | SYSTOLIC BLOOD PRESSURE: 158 MMHG | HEART RATE: 88 BPM | DIASTOLIC BLOOD PRESSURE: 83 MMHG | RESPIRATION RATE: 16 BRPM | TEMPERATURE: 97.2 F | BODY MASS INDEX: 31.86 KG/M2

## 2023-03-31 DIAGNOSIS — M17.0 PRIMARY OSTEOARTHRITIS OF BOTH KNEES: Primary | ICD-10-CM

## 2023-03-31 PROCEDURE — 1123F ACP DISCUSS/DSCN MKR DOCD: CPT | Performed by: FAMILY MEDICINE

## 2023-03-31 PROCEDURE — 3074F SYST BP LT 130 MM HG: CPT | Performed by: FAMILY MEDICINE

## 2023-03-31 PROCEDURE — 99213 OFFICE O/P EST LOW 20 MIN: CPT | Performed by: FAMILY MEDICINE

## 2023-03-31 PROCEDURE — 3078F DIAST BP <80 MM HG: CPT | Performed by: FAMILY MEDICINE

## 2023-03-31 RX ORDER — NAPROXEN 500 MG/1
TABLET ORAL
COMMUNITY
Start: 2023-03-22

## 2023-03-31 RX ORDER — MELOXICAM 15 MG/1
15 TABLET ORAL DAILY
Qty: 7 TABLET | Refills: 0 | Status: SHIPPED | OUTPATIENT
Start: 2023-03-31 | End: 2023-04-07

## 2023-03-31 NOTE — PROGRESS NOTES
[facility]  Precepting Note    Subjective:  68 yo F c/o bilateral knee pain for weeks  Anjel both knees when she stepped over a door jamb and half tripped without falling. She had injection in February  Was seen in urgent care and xr done which did not show acute distress. ROS otherwise negative     Past medical, surgical, family and social history were reviewed, non-contributory, and unchanged unless otherwise stated. Objective:    BP (!) 166/86   Pulse 88   Temp 97.2 °F (36.2 °C) (Temporal)   Resp 16   Ht 5' 7\" (1.702 m)   Wt 203 lb (92.1 kg)   SpO2 95%   BMI 31.79 kg/m²     Exam is as noted by resident with the following changes, additions or corrections:    Tenderness pain and swelling joint line   Normal strength and sensation  Normal lachmans    Assessment/Plan:  Bilateral knee pain - consistent with osteoarthritis aggravated by minor trauma  Refer to physical therapy   Short course of nsaids  Refer to procedure clinic as needed for injection  HTN - bp over goal; aggravated by pain      Attending Physician Statement  I have reviewed the chart, including any radiology or labs. I have discussed the case, including pertinent history and exam findings with the resident. I agree with the assessment, plan and orders as documented by the resident. Please refer to the resident note for additional information.       Electronically signed by Garcia Nevarez MD on 3/31/2023 at 3:24 PM

## 2023-03-31 NOTE — PROGRESS NOTES
[facility]  Department of Family Medicine  Family Medicine Residency Program      Patient:  Sary Buchanan 69 y.o. female     Date of Service: 3/31/23      Chief complaint:   Chief Complaint   Patient presents with    Leg Pain     Right hip         History of Present Illness   The patient is a 69 y.o. female  presented to the clinic with complaints as above. Bilateral knee pain -   Started 2 weeks ago  Right more painful  Went to urgent care and was diagnosed with OA  Hx of OA and has been doing well up until now  Taking naproxen which is helping intermittently  Tripped over door stop which was when this started  Previously treated with cortisone shot which helped before   No fall, numbness, tingling, weakness      Past Medical History:      Diagnosis Date    Bronchitis     Hyperlipidemia     Hypertension     Thyroid disease        Past Surgical History:        Procedure Laterality Date    COLONOSCOPY  1/21/2015    UPPER GASTROINTESTINAL ENDOSCOPY  1/21/2015       Allergies:    Seasonal and Penicillins    Social History:   Social History     Socioeconomic History    Marital status:       Spouse name: Not on file    Number of children: Not on file    Years of education: Not on file    Highest education level: Not on file   Occupational History     Employer: NOT EMPLOYED   Tobacco Use    Smoking status: Never    Smokeless tobacco: Never   Vaping Use    Vaping Use: Never used   Substance and Sexual Activity    Alcohol use: No     Alcohol/week: 0.0 standard drinks    Drug use: No    Sexual activity: Not Currently   Other Topics Concern    Not on file   Social History Narrative    Not on file     Social Determinants of Health     Financial Resource Strain: Low Risk     Difficulty of Paying Living Expenses: Not hard at all   Food Insecurity: No Food Insecurity    Worried About [address] in the Last Year: Never true    [address] in the Last Year: Never true

## 2023-04-03 ASSESSMENT — ENCOUNTER SYMPTOMS
CONSTIPATION: 0
NAUSEA: 0
CHEST TIGHTNESS: 0
COUGH: 0
SHORTNESS OF BREATH: 0
ABDOMINAL PAIN: 0
RHINORRHEA: 0
DIARRHEA: 0
SORE THROAT: 0
VOMITING: 0

## 2023-05-01 ENCOUNTER — TELEPHONE (OUTPATIENT)
Dept: FAMILY MEDICINE CLINIC | Age: 68
End: 2023-05-01

## 2023-05-01 DIAGNOSIS — M17.0 PRIMARY OSTEOARTHRITIS OF BOTH KNEES: Primary | ICD-10-CM

## 2023-05-01 NOTE — TELEPHONE ENCOUNTER
Referral pending your review and approval     Will need faxed to   [address] [place] [address] [address] [facility] [address]  P: [phone]     F: [phone]

## 2023-05-01 NOTE — TELEPHONE ENCOUNTER
[employer] [facility] for physical therapy, but referral from March expires today. They need a new referral for pt for oa of bilateral knees faxed to [phone].

## 2023-05-01 NOTE — TELEPHONE ENCOUNTER
Pt wants Dr to know that she has completed 8 PT sessions and thinks she only needs a few more because they're also giving her exercises to do at home

## 2023-05-24 RX ORDER — LEVOCETIRIZINE DIHYDROCHLORIDE 5 MG/1
TABLET, FILM COATED ORAL
Qty: 90 TABLET | Refills: 3 | Status: SHIPPED | OUTPATIENT
Start: 2023-05-24

## 2023-05-26 ENCOUNTER — OFFICE VISIT (OUTPATIENT)
Dept: FAMILY MEDICINE CLINIC | Age: 68
End: 2023-05-26
Payer: MEDICARE

## 2023-05-26 VITALS
HEIGHT: 67 IN | SYSTOLIC BLOOD PRESSURE: 138 MMHG | BODY MASS INDEX: 30.76 KG/M2 | WEIGHT: 196 LBS | HEART RATE: 59 BPM | DIASTOLIC BLOOD PRESSURE: 70 MMHG

## 2023-05-26 DIAGNOSIS — I10 ESSENTIAL HYPERTENSION: Chronic | ICD-10-CM

## 2023-05-26 DIAGNOSIS — Z12.31 ENCOUNTER FOR SCREENING MAMMOGRAM FOR MALIGNANT NEOPLASM OF BREAST: ICD-10-CM

## 2023-05-26 DIAGNOSIS — E11.9 CONTROLLED TYPE 2 DIABETES MELLITUS WITHOUT COMPLICATION, WITHOUT LONG-TERM CURRENT USE OF INSULIN (HCC): Primary | ICD-10-CM

## 2023-05-26 PROCEDURE — 1123F ACP DISCUSS/DSCN MKR DOCD: CPT | Performed by: FAMILY MEDICINE

## 2023-05-26 PROCEDURE — 3074F SYST BP LT 130 MM HG: CPT | Performed by: FAMILY MEDICINE

## 2023-05-26 PROCEDURE — 83036 HEMOGLOBIN GLYCOSYLATED A1C: CPT | Performed by: FAMILY MEDICINE

## 2023-05-26 PROCEDURE — 99213 OFFICE O/P EST LOW 20 MIN: CPT | Performed by: FAMILY MEDICINE

## 2023-05-26 PROCEDURE — 3078F DIAST BP <80 MM HG: CPT | Performed by: FAMILY MEDICINE

## 2023-05-26 RX ORDER — LEVOTHYROXINE SODIUM 0.03 MG/1
TABLET ORAL
COMMUNITY
Start: 2023-05-25

## 2023-05-26 RX ORDER — ROSUVASTATIN CALCIUM 10 MG/1
TABLET, COATED ORAL
COMMUNITY
Start: 2023-05-17

## 2023-05-26 RX ORDER — CHLORTHALIDONE 25 MG/1
25 TABLET ORAL DAILY
Qty: 90 TABLET | Refills: 1 | Status: SHIPPED | OUTPATIENT
Start: 2023-05-26

## 2023-06-01 ENCOUNTER — TELEPHONE (OUTPATIENT)
Dept: FAMILY MEDICINE CLINIC | Age: 68
End: 2023-06-01

## 2023-06-01 NOTE — TELEPHONE ENCOUNTER
Last Appointment   5/26/2023  Next Appointment  10/26/2023  Patient needs a new handicap placard, she misplaced the one you gave her, please call when ready to .

## 2023-07-28 RX ORDER — LANCETS 33 GAUGE
EACH MISCELLANEOUS
Qty: 100 EACH | Refills: 5 | Status: SHIPPED | OUTPATIENT
Start: 2023-07-28

## 2023-08-02 ENCOUNTER — OFFICE VISIT (OUTPATIENT)
Dept: FAMILY MEDICINE CLINIC | Age: 68
End: 2023-08-02
Payer: MEDICARE

## 2023-08-02 VITALS
TEMPERATURE: 98.3 F | SYSTOLIC BLOOD PRESSURE: 158 MMHG | BODY MASS INDEX: 30.61 KG/M2 | RESPIRATION RATE: 16 BRPM | WEIGHT: 195 LBS | HEART RATE: 67 BPM | DIASTOLIC BLOOD PRESSURE: 82 MMHG | HEIGHT: 67 IN | OXYGEN SATURATION: 93 %

## 2023-08-02 DIAGNOSIS — J01.00 ACUTE MAXILLARY SINUSITIS, RECURRENCE NOT SPECIFIED: ICD-10-CM

## 2023-08-02 DIAGNOSIS — H61.22 IMPACTED CERUMEN OF LEFT EAR: ICD-10-CM

## 2023-08-02 DIAGNOSIS — K59.09 OTHER CONSTIPATION: ICD-10-CM

## 2023-08-02 DIAGNOSIS — I10 ESSENTIAL HYPERTENSION: ICD-10-CM

## 2023-08-02 DIAGNOSIS — M70.61 TROCHANTERIC BURSITIS OF RIGHT HIP: ICD-10-CM

## 2023-08-02 PROCEDURE — 1123F ACP DISCUSS/DSCN MKR DOCD: CPT

## 2023-08-02 PROCEDURE — 3074F SYST BP LT 130 MM HG: CPT

## 2023-08-02 PROCEDURE — 3078F DIAST BP <80 MM HG: CPT

## 2023-08-02 PROCEDURE — 20605 DRAIN/INJ JOINT/BURSA W/O US: CPT

## 2023-08-02 PROCEDURE — 99213 OFFICE O/P EST LOW 20 MIN: CPT

## 2023-08-02 RX ORDER — LIDOCAINE HYDROCHLORIDE 10 MG/ML
2 INJECTION, SOLUTION EPIDURAL; INFILTRATION; INTRACAUDAL; PERINEURAL ONCE
Status: COMPLETED | OUTPATIENT
Start: 2023-08-02 | End: 2023-08-02

## 2023-08-02 RX ORDER — TRIAMCINOLONE ACETONIDE 40 MG/ML
40 INJECTION, SUSPENSION INTRA-ARTICULAR; INTRAMUSCULAR ONCE
Status: COMPLETED | OUTPATIENT
Start: 2023-08-02 | End: 2023-08-02

## 2023-08-02 RX ORDER — CELECOXIB 100 MG/1
100 CAPSULE ORAL 2 TIMES DAILY PRN
Qty: 15 CAPSULE | Refills: 0 | Status: SHIPPED | OUTPATIENT
Start: 2023-08-02

## 2023-08-02 RX ADMIN — TRIAMCINOLONE ACETONIDE 40 MG: 40 INJECTION, SUSPENSION INTRA-ARTICULAR; INTRAMUSCULAR at 22:15

## 2023-08-02 RX ADMIN — LIDOCAINE HYDROCHLORIDE 2 ML: 10 INJECTION, SOLUTION EPIDURAL; INFILTRATION; INTRACAUDAL; PERINEURAL at 22:15

## 2023-08-02 RX ADMIN — LIDOCAINE HYDROCHLORIDE 2 ML: 10 INJECTION, SOLUTION EPIDURAL; INFILTRATION; INTRACAUDAL; PERINEURAL at 15:10

## 2023-08-02 NOTE — PATIENT INSTRUCTIONS
Start taking the astelin spray again     Heat/Ice for the hip pain     Stay hydrated - drink lots of water

## 2023-08-04 ASSESSMENT — ENCOUNTER SYMPTOMS
COUGH: 0
SINUS PAIN: 0
VOMITING: 0
CHEST TIGHTNESS: 0
SHORTNESS OF BREATH: 0
NAUSEA: 0
DIARRHEA: 0
EYE PAIN: 0
BLOOD IN STOOL: 0
EYE REDNESS: 0
SINUS PRESSURE: 1
ABDOMINAL PAIN: 1
EYE DISCHARGE: 0

## 2023-08-10 DIAGNOSIS — M70.61 TROCHANTERIC BURSITIS OF RIGHT HIP: ICD-10-CM

## 2023-08-10 RX ORDER — CELECOXIB 100 MG/1
CAPSULE ORAL
Qty: 15 CAPSULE | Refills: 0 | OUTPATIENT
Start: 2023-08-10

## 2023-09-05 RX ORDER — BLOOD SUGAR DIAGNOSTIC
STRIP MISCELLANEOUS
Qty: 100 STRIP | Refills: 5 | Status: SHIPPED | OUTPATIENT
Start: 2023-09-05

## 2023-10-05 LAB
AVERAGE GLUCOSE: NORMAL
HBA1C MFR BLD: 6 %

## 2023-10-14 ENCOUNTER — APPOINTMENT (OUTPATIENT)
Dept: CT IMAGING | Age: 68
End: 2023-10-14
Payer: OTHER MISCELLANEOUS

## 2023-10-14 ENCOUNTER — APPOINTMENT (OUTPATIENT)
Dept: GENERAL RADIOLOGY | Age: 68
End: 2023-10-14
Payer: OTHER MISCELLANEOUS

## 2023-10-14 ENCOUNTER — HOSPITAL ENCOUNTER (EMERGENCY)
Age: 68
Discharge: HOME | End: 2023-10-14
Attending: STUDENT IN AN ORGANIZED HEALTH CARE EDUCATION/TRAINING PROGRAM
Payer: OTHER MISCELLANEOUS

## 2023-10-14 VITALS
RESPIRATION RATE: 18 BRPM | WEIGHT: 193 LBS | DIASTOLIC BLOOD PRESSURE: 97 MMHG | BODY MASS INDEX: 30.23 KG/M2 | OXYGEN SATURATION: 98 % | HEART RATE: 71 BPM | SYSTOLIC BLOOD PRESSURE: 154 MMHG | TEMPERATURE: 97.8 F
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
## 2023-10-14 DIAGNOSIS — S16.1XXA STRAIN OF NECK MUSCLE, INITIAL ENCOUNTER: Primary | ICD-10-CM

## 2023-10-14 PROCEDURE — 99284 EMERGENCY DEPT VISIT MOD MDM: CPT

## 2023-10-14 PROCEDURE — 70450 CT HEAD/BRAIN W/O DYE: CPT

## 2023-10-14 PROCEDURE — 6370000000 HC RX 637 (ALT 250 FOR IP): Performed by: STUDENT IN AN ORGANIZED HEALTH CARE EDUCATION/TRAINING PROGRAM

## 2023-10-14 PROCEDURE — 71045 X-RAY EXAM CHEST 1 VIEW: CPT

## 2023-10-14 PROCEDURE — 72125 CT NECK SPINE W/O DYE: CPT

## 2023-10-14 RX ORDER — TIZANIDINE 2 MG/1
2 TABLET ORAL EVERY 6 HOURS PRN
Qty: 16 TABLET | Refills: 0 | Status: SHIPPED | OUTPATIENT
Start: 2023-10-14 | End: 2023-10-18

## 2023-10-14 RX ORDER — OXYCODONE HYDROCHLORIDE AND ACETAMINOPHEN 5; 325 MG/1; MG/1
1 TABLET ORAL ONCE
Status: COMPLETED | OUTPATIENT
Start: 2023-10-14 | End: 2023-10-14

## 2023-10-14 RX ADMIN — OXYCODONE AND ACETAMINOPHEN 1 TABLET: 5; 325 TABLET ORAL at 18:23

## 2023-10-14 ASSESSMENT — PAIN - FUNCTIONAL ASSESSMENT
PAIN_FUNCTIONAL_ASSESSMENT: 0-10
PAIN_FUNCTIONAL_ASSESSMENT: NONE - DENIES PAIN
PAIN_FUNCTIONAL_ASSESSMENT: ACTIVITIES ARE NOT PREVENTED

## 2023-10-14 ASSESSMENT — PAIN DESCRIPTION - DESCRIPTORS: DESCRIPTORS: ACHING;SORE;TENDER;DISCOMFORT

## 2023-10-14 ASSESSMENT — PAIN DESCRIPTION - ONSET: ONSET: ON-GOING

## 2023-10-14 ASSESSMENT — PAIN DESCRIPTION - FREQUENCY: FREQUENCY: CONTINUOUS

## 2023-10-14 ASSESSMENT — PAIN DESCRIPTION - ORIENTATION: ORIENTATION: RIGHT;LEFT

## 2023-10-14 ASSESSMENT — PAIN DESCRIPTION - LOCATION
LOCATION: SHOULDER;NECK
LOCATION: GENERALIZED

## 2023-10-14 ASSESSMENT — LIFESTYLE VARIABLES
HOW MANY STANDARD DRINKS CONTAINING ALCOHOL DO YOU HAVE ON A TYPICAL DAY: PATIENT DOES NOT DRINK
HOW OFTEN DO YOU HAVE A DRINK CONTAINING ALCOHOL: NEVER

## 2023-10-14 ASSESSMENT — PAIN SCALES - GENERAL
PAINLEVEL_OUTOF10: 9
PAINLEVEL_OUTOF10: 10

## 2023-10-14 ASSESSMENT — PAIN DESCRIPTION - PAIN TYPE: TYPE: ACUTE PAIN

## 2023-10-26 ENCOUNTER — OFFICE VISIT (OUTPATIENT)
Dept: FAMILY MEDICINE CLINIC | Age: 68
End: 2023-10-26

## 2023-10-26 VITALS
SYSTOLIC BLOOD PRESSURE: 138 MMHG | TEMPERATURE: 97.8 F | HEIGHT: 67 IN | WEIGHT: 186 LBS | RESPIRATION RATE: 18 BRPM | OXYGEN SATURATION: 97 % | DIASTOLIC BLOOD PRESSURE: 80 MMHG | BODY MASS INDEX: 29.19 KG/M2 | HEART RATE: 68 BPM

## 2023-10-26 DIAGNOSIS — E11.9 CONTROLLED TYPE 2 DIABETES MELLITUS WITHOUT COMPLICATION, WITHOUT LONG-TERM CURRENT USE OF INSULIN (HCC): ICD-10-CM

## 2023-10-26 DIAGNOSIS — M25.519 NECK AND SHOULDER PAIN: ICD-10-CM

## 2023-10-26 DIAGNOSIS — Z00.00 MEDICARE ANNUAL WELLNESS VISIT, SUBSEQUENT: Primary | ICD-10-CM

## 2023-10-26 DIAGNOSIS — Z23 NEED FOR INFLUENZA VACCINATION: ICD-10-CM

## 2023-10-26 DIAGNOSIS — M54.2 NECK AND SHOULDER PAIN: ICD-10-CM

## 2023-10-26 LAB
CREATININE URINE: 24.9 MG/DL (ref 29–226)
MICROALBUMIN/CREAT 24H UR: <12 MG/L (ref 0–19)
MICROALBUMIN/CREAT UR-RTO: ABNORMAL MCG/MG CREAT (ref 0–30)

## 2023-10-26 RX ORDER — TRIAMCINOLONE ACETONIDE 1 MG/G
CREAM TOPICAL
COMMUNITY
Start: 2023-10-06

## 2023-10-26 RX ORDER — ATORVASTATIN CALCIUM 10 MG/1
10 TABLET, FILM COATED ORAL DAILY
Qty: 30 TABLET | Refills: 11 | COMMUNITY
Start: 2023-10-11 | End: 2024-10-10

## 2023-10-26 RX ORDER — ERGOCALCIFEROL 1.25 MG/1
CAPSULE ORAL
COMMUNITY
Start: 2023-10-09

## 2023-10-26 ASSESSMENT — PATIENT HEALTH QUESTIONNAIRE - PHQ9
2. FEELING DOWN, DEPRESSED OR HOPELESS: 0
SUM OF ALL RESPONSES TO PHQ QUESTIONS 1-9: 0
SUM OF ALL RESPONSES TO PHQ QUESTIONS 1-9: 0
1. LITTLE INTEREST OR PLEASURE IN DOING THINGS: 0
SUM OF ALL RESPONSES TO PHQ QUESTIONS 1-9: 0
SUM OF ALL RESPONSES TO PHQ QUESTIONS 1-9: 0
SUM OF ALL RESPONSES TO PHQ9 QUESTIONS 1 & 2: 0

## 2023-10-26 NOTE — PATIENT INSTRUCTIONS
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
## 2023-10-27 ENCOUNTER — CARE COORDINATION (OUTPATIENT)
Dept: CARE COORDINATION | Age: 68
End: 2023-10-27

## 2023-10-27 NOTE — CARE COORDINATION
ACM attempted to contact patient to offer enrollment into Care Coordination. ACM left a voice message with contact information requesting a return call.      PLAN  -If no return call, continue to attempt to contact patient.   -Introduction letter mailed

## 2023-10-27 NOTE — CARE COORDINATION
[facility] contacted patient to offer Care Coordination and to discuss the need for bereavement and grief counseling resources. Patient declined CC at this time. ACM encouraged patient to contact [facility] if she feels she needs assistance managing her health care needs in the future. Patient directed to [facility] bereavement team resource person Nancy Waters at [phone]. Patient content with reaching out to [facility] regarding their groups and requests no further information at this time. PLAN:  Patient will be temporarily excluded from Care Coordination d/t declined enrollment. Patient reminded she may enroll at any time with outreach to [facility] or PCP in the future. Verbalized understanding. Letter previously routed for home delivery and patient aware.

## 2023-11-15 ENCOUNTER — TELEPHONE (OUTPATIENT)
Dept: FAMILY MEDICINE CLINIC | Age: 68
End: 2023-11-15

## 2023-11-15 DIAGNOSIS — M54.50 LOW BACK PAIN, UNSPECIFIED BACK PAIN LATERALITY, UNSPECIFIED CHRONICITY, UNSPECIFIED WHETHER SCIATICA PRESENT: Primary | ICD-10-CM

## 2023-11-15 NOTE — TELEPHONE ENCOUNTER
Albert Lisa @ [facility] calling-    Pt sees them for PT of neck and shoulder- complained to them of low back pain- [facility] calling and requesting script for PT of lower back.  Can fax script to them @ fax: [phone]

## 2023-11-30 DIAGNOSIS — J30.9 ALLERGIC RHINITIS, UNSPECIFIED SEASONALITY, UNSPECIFIED TRIGGER: ICD-10-CM

## 2023-11-30 RX ORDER — FLUTICASONE PROPIONATE 50 MCG
SPRAY, SUSPENSION (ML) NASAL
Qty: 16 G | Refills: 5 | Status: SHIPPED | OUTPATIENT
Start: 2023-11-30

## 2024-02-05 ENCOUNTER — OFFICE VISIT (OUTPATIENT)
Dept: FAMILY MEDICINE CLINIC | Age: 69
End: 2024-02-05
Payer: MEDICARE

## 2024-02-05 VITALS
HEIGHT: 67 IN | TEMPERATURE: 97.4 F | OXYGEN SATURATION: 97 % | SYSTOLIC BLOOD PRESSURE: 144 MMHG | RESPIRATION RATE: 16 BRPM | HEART RATE: 77 BPM | BODY MASS INDEX: 29.63 KG/M2 | DIASTOLIC BLOOD PRESSURE: 88 MMHG | WEIGHT: 188.8 LBS

## 2024-02-05 DIAGNOSIS — B34.9 VIRAL ILLNESS: Primary | ICD-10-CM

## 2024-02-05 DIAGNOSIS — R09.81 SINUS CONGESTION: ICD-10-CM

## 2024-02-05 DIAGNOSIS — R05.1 ACUTE COUGH: ICD-10-CM

## 2024-02-05 DIAGNOSIS — H66.001 NON-RECURRENT ACUTE SUPPURATIVE OTITIS MEDIA OF RIGHT EAR WITHOUT SPONTANEOUS RUPTURE OF TYMPANIC MEMBRANE: ICD-10-CM

## 2024-02-05 LAB
INFLUENZA A ANTIGEN, POC: NEGATIVE
INFLUENZA B ANTIGEN, POC: NEGATIVE
LOT EXPIRE DATE: NORMAL
LOT KIT NUMBER: NORMAL
SARS-COV-2, POC: NORMAL
VALID INTERNAL CONTROL: NORMAL
VENDOR AND KIT NAME POC: NORMAL

## 2024-02-05 PROCEDURE — 99213 OFFICE O/P EST LOW 20 MIN: CPT | Performed by: NURSE PRACTITIONER

## 2024-02-05 PROCEDURE — 3079F DIAST BP 80-89 MM HG: CPT | Performed by: NURSE PRACTITIONER

## 2024-02-05 PROCEDURE — 1123F ACP DISCUSS/DSCN MKR DOCD: CPT | Performed by: NURSE PRACTITIONER

## 2024-02-05 PROCEDURE — 87428 SARSCOV & INF VIR A&B AG IA: CPT | Performed by: NURSE PRACTITIONER

## 2024-02-05 PROCEDURE — 3077F SYST BP >= 140 MM HG: CPT | Performed by: NURSE PRACTITIONER

## 2024-02-05 RX ORDER — CEFDINIR 300 MG/1
300 CAPSULE ORAL 2 TIMES DAILY
Qty: 20 CAPSULE | Refills: 0 | Status: SHIPPED | OUTPATIENT
Start: 2024-02-05 | End: 2024-02-15

## 2024-02-05 RX ORDER — BENZONATATE 100 MG/1
CAPSULE ORAL
Qty: 30 CAPSULE | Refills: 0 | Status: SHIPPED | OUTPATIENT
Start: 2024-02-05

## 2024-02-05 ASSESSMENT — PATIENT HEALTH QUESTIONNAIRE - PHQ9
2. FEELING DOWN, DEPRESSED OR HOPELESS: 0
1. LITTLE INTEREST OR PLEASURE IN DOING THINGS: 0
SUM OF ALL RESPONSES TO PHQ QUESTIONS 1-9: 0
SUM OF ALL RESPONSES TO PHQ9 QUESTIONS 1 & 2: 0

## 2024-02-05 NOTE — PROGRESS NOTES
24  Jeannie Soliman : 1955 Sex: female  Age 68 y.o.    Subjective:  Chief Complaint   Patient presents with    Congestion    Cough    Drainage    Chills    Sweats       HPI:   Jeannie Soliman , 68 y.o. female presents to the clinic for evaluation of sinus congestion x 2 days. The patient also reports cough, chills, and ear discomfort. The patient has taken Cold Flu med, Xyzal for symptoms. The patient reports worsening symptoms over time. The patient denies known ill exposure. The patient denies headache, sore throat, rash, and fever. The patient also denies chest pain, abdominal pain, shortness of breath, wheezing, and nausea / vomiting / diarrhea.    ROS:   Unless otherwise stated in this report the patient's positive and negative responses for review of systems for constitutional, eyes, ENT, cardiovascular, respiratory, gastrointestinal, neurological, , musculoskeletal, and integument systems and related systems to the presenting problem are either stated in the history of present illness or were not pertinent or were negative for the symptoms and/or complaints related to the presenting medical problem.  Positives and pertinent negatives as per HPI.  All others reviewed and are negative.      PMH:     Past Medical History:   Diagnosis Date    Bronchitis     Hyperlipidemia     Hypertension     Thyroid disease        Past Surgical History:   Procedure Laterality Date    COLONOSCOPY  2015    UPPER GASTROINTESTINAL ENDOSCOPY  2015       History reviewed. No pertinent family history.    Medications:     Current Outpatient Medications:     cefdinir (OMNICEF) 300 MG capsule, Take 1 capsule by mouth 2 times daily for 10 days, Disp: 20 capsule, Rfl: 0    benzonatate (TESSALON PERLES) 100 MG capsule, 1-2 tablets every 8 hours as needed for cough, Disp: 30 capsule, Rfl: 0    fluticasone (FLONASE) 50 MCG/ACT nasal spray, SHAKE LIQUID AND USE 1 SPRAY IN EACH NOSTRIL EVERY DAY AS NEEDED, Disp: 16 g,

## 2024-03-26 RX ORDER — ISOPROPYL ALCOHOL 0.75 G/1
SWAB TOPICAL
Qty: 100 EACH | Refills: 11 | Status: SHIPPED | OUTPATIENT
Start: 2024-03-26

## 2024-04-18 ENCOUNTER — OFFICE VISIT (OUTPATIENT)
Dept: FAMILY MEDICINE CLINIC | Age: 69
End: 2024-04-18

## 2024-04-18 VITALS
BODY MASS INDEX: 30.13 KG/M2 | WEIGHT: 192 LBS | SYSTOLIC BLOOD PRESSURE: 162 MMHG | HEART RATE: 63 BPM | HEIGHT: 67 IN | OXYGEN SATURATION: 97 % | DIASTOLIC BLOOD PRESSURE: 100 MMHG | TEMPERATURE: 97.9 F

## 2024-04-18 DIAGNOSIS — R05.9 COUGH, UNSPECIFIED TYPE: ICD-10-CM

## 2024-04-18 DIAGNOSIS — J06.9 URI WITH COUGH AND CONGESTION: Primary | ICD-10-CM

## 2024-04-18 DIAGNOSIS — H61.22 CERUMEN DEBRIS ON TYMPANIC MEMBRANE OF LEFT EAR: ICD-10-CM

## 2024-04-18 DIAGNOSIS — I10 ELEVATED BLOOD PRESSURE READING IN OFFICE WITH DIAGNOSIS OF HYPERTENSION: ICD-10-CM

## 2024-04-18 RX ORDER — BENZONATATE 100 MG/1
100 CAPSULE ORAL 3 TIMES DAILY PRN
Qty: 21 CAPSULE | Refills: 0 | Status: SHIPPED | OUTPATIENT
Start: 2024-04-18 | End: 2024-04-25

## 2024-04-18 RX ORDER — ASPIRIN 81 MG
5 TABLET, DELAYED RELEASE (ENTERIC COATED) ORAL PRN
Qty: 15 ML | Refills: 0 | Status: SHIPPED | OUTPATIENT
Start: 2024-04-18

## 2024-04-18 RX ORDER — AZITHROMYCIN 250 MG/1
TABLET, FILM COATED ORAL
Qty: 6 TABLET | Refills: 0 | Status: SHIPPED | OUTPATIENT
Start: 2024-04-18

## 2024-04-18 NOTE — PROGRESS NOTES
Chief Complaint       Cough (Non-productive, all started 2 days ago), Nasal Congestion (Runny clear with drainage  down throat.), Chills, Sweats, and Ear Fullness (Clogged ears)    History of Present Illness   Source of history provided by:  patient.      Jeannie Soliman is a 68 y.o. old female presenting to the walk in clinic for evaluation of nasal congestion, nasal drainage, bilateral ear pressure, mild non-productive cough and sore throat x 2 days.  Denies any fever but reports intermittently feeling warm and cold.  Has been taking Mucinex and cough syrup OTC without relief. Denies any fever, chills, wheezing, CP, SOB, or GI symptoms. Denies any hx of asthma, COPD, or tobacco use.      ROS    Unless otherwise stated in this report or unable to obtain because of the patient's clinical or mental status as evidenced by the medical record, this patients's positive and negative responses for Review of Systems, constitutional, psych, eyes, ENT, cardiovascular, respiratory, gastrointestinal, neurological, genitourinary, musculoskeletal, integument systems and systems related to the presenting problem are either stated in the preceding or were not pertinent or were negative for the symptoms and/or complaints related to the medical problem.      Physical Exam         VS:  BP (!) 162/100   Pulse 63   Temp 97.9 °F (36.6 °C) (Temporal)   Ht 1.702 m (5' 7\")   Wt 87.1 kg (192 lb)   SpO2 97%   BMI 30.07 kg/m²    Oxygen Saturation Interpretation: Normal.    Constitutional:  Alert, development consistent with age.  Ears:  External Ears: Bilateral pinna normal.  Bilateral tympanic membranes translucent without erythema, there is white cerumen debris in the left canal.  Canals normal bilaterally without swelling.  Nose: Mild congestion of the nasal mucosa. There is injection to middle turbinates bilaterally.   Throat: Mild posterior pharyngeal erythema with mild post nasal drip present.  No exudate or tonsillar

## 2024-04-24 ENCOUNTER — HOSPITAL ENCOUNTER (EMERGENCY)
Age: 69
Discharge: HOME | End: 2024-04-24
Attending: EMERGENCY MEDICINE
Payer: MEDICARE

## 2024-04-24 VITALS
DIASTOLIC BLOOD PRESSURE: 82 MMHG | RESPIRATION RATE: 18 BRPM | SYSTOLIC BLOOD PRESSURE: 167 MMHG | HEART RATE: 75 BPM | TEMPERATURE: 98.9 F | OXYGEN SATURATION: 98 %

## 2024-04-24 DIAGNOSIS — I10 HYPERTENSION, UNSPECIFIED TYPE: Primary | ICD-10-CM

## 2024-04-24 PROCEDURE — 99283 EMERGENCY DEPT VISIT LOW MDM: CPT

## 2024-04-24 RX ORDER — AMLODIPINE BESYLATE 5 MG/1
5 TABLET ORAL DAILY
Qty: 30 TABLET | Refills: 0 | Status: SHIPPED | OUTPATIENT
Start: 2024-04-24 | End: 2024-04-26 | Stop reason: SDUPTHER

## 2024-04-25 NOTE — ED PROVIDER NOTES
HPI:  4/24/24,   Time: 8:39 PM EDT       Jeannie Soliman is a 68 y.o. female presenting to the ED for asymptomatic htn, beginning unk ago.  The complaint has been persistent, mild in severity, and worsened by nothing.  Seen in urgent care.  Sent here.  No complaints.  No chest pain.  No abdominal pain.  No lightheadedness.  No headache.  No numbness or tingling.  States gave given clonidine at urgent care.  States urgent care did EKG.    Review of Systems:   Pertinent positives and negatives are stated within HPI, all other systems reviewed and are negative.          --------------------------------------------- PAST HISTORY ---------------------------------------------  Past Medical History:  has a past medical history of Bronchitis, Hyperlipidemia, Hypertension, and Thyroid disease.    Past Surgical History:  has a past surgical history that includes Colonoscopy (1/21/2015) and Upper gastrointestinal endoscopy (1/21/2015).    Social History:  reports that she has never smoked. She has never used smokeless tobacco. She reports that she does not drink alcohol and does not use drugs.    Family History: family history is not on file.     The patient’s home medications have been reviewed.    Allergies: Seasonal and Penicillins        ---------------------------------------------------PHYSICAL EXAM--------------------------------------    Constitutional/General: Alert and oriented x3, well appearing, non toxic in NAD  Head: Normocephalic and atraumatic  Eyes: PERRL, EOMI, conjunctive normal, sclera non icteric  Mouth: Oropharynx clear, handling secretions,  Neck: Supple, full ROM,   Respiratory:  Not in respiratory distress  Cardiovascular:  Regular rate. Regular rhythm. . 2+ distal pulses  Chest: No chest wall tenderness  GI:  Abdomen Soft, Non tender, Non distended.     Musculoskeletal: Moves all extremities x 4. Warm and well perfused,   Integument: skin warm and dry. No rashes.   Lymphatic: no lymphadenopathy

## 2024-04-26 ENCOUNTER — CARE COORDINATION (OUTPATIENT)
Dept: CARE COORDINATION | Age: 69
End: 2024-04-26

## 2024-04-26 ENCOUNTER — OFFICE VISIT (OUTPATIENT)
Dept: FAMILY MEDICINE CLINIC | Age: 69
End: 2024-04-26

## 2024-04-26 VITALS
TEMPERATURE: 98.3 F | DIASTOLIC BLOOD PRESSURE: 70 MMHG | SYSTOLIC BLOOD PRESSURE: 150 MMHG | BODY MASS INDEX: 29.76 KG/M2 | WEIGHT: 190 LBS | RESPIRATION RATE: 19 BRPM | OXYGEN SATURATION: 97 % | HEART RATE: 73 BPM

## 2024-04-26 DIAGNOSIS — Z76.0 MEDICATION REFILL: ICD-10-CM

## 2024-04-26 DIAGNOSIS — I44.0 ATRIOVENTRICULAR BLOCK, FIRST DEGREE: ICD-10-CM

## 2024-04-26 DIAGNOSIS — I10 ESSENTIAL HYPERTENSION: Primary | Chronic | ICD-10-CM

## 2024-04-26 DIAGNOSIS — J30.9 ALLERGIC RHINITIS, UNSPECIFIED SEASONALITY, UNSPECIFIED TRIGGER: ICD-10-CM

## 2024-04-26 LAB
ANION GAP SERPL CALCULATED.3IONS-SCNC: 14 MMOL/L (ref 7–16)
BUN BLDV-MCNC: 13 MG/DL (ref 6–23)
CALCIUM SERPL-MCNC: 10.1 MG/DL (ref 8.6–10.2)
CHLORIDE BLD-SCNC: 102 MMOL/L (ref 98–107)
CO2: 24 MMOL/L (ref 22–29)
CREAT SERPL-MCNC: 0.6 MG/DL (ref 0.5–1)
GFR SERPL CREATININE-BSD FRML MDRD: >90 ML/MIN/1.73M2
GLUCOSE BLD-MCNC: 102 MG/DL (ref 74–99)
POTASSIUM SERPL-SCNC: 4 MMOL/L (ref 3.5–5)
SODIUM BLD-SCNC: 140 MMOL/L (ref 132–146)

## 2024-04-26 RX ORDER — POLYETHYLENE GLYCOL 3350 17 G/17G
POWDER, FOR SOLUTION ORAL
Qty: 510 G | Refills: 11 | Status: SHIPPED | OUTPATIENT
Start: 2024-04-26

## 2024-04-26 RX ORDER — CHLORTHALIDONE 25 MG/1
25 TABLET ORAL DAILY
Qty: 100 EACH | Refills: 0 | Status: SHIPPED | OUTPATIENT
Start: 2024-04-26

## 2024-04-26 RX ORDER — LEVOCETIRIZINE DIHYDROCHLORIDE 5 MG/1
5 TABLET, FILM COATED ORAL NIGHTLY
COMMUNITY

## 2024-04-26 RX ORDER — FLUTICASONE PROPIONATE 50 MCG
SPRAY, SUSPENSION (ML) NASAL
Qty: 16 G | Refills: 5 | Status: SHIPPED | OUTPATIENT
Start: 2024-04-26

## 2024-04-26 RX ORDER — AMLODIPINE BESYLATE 5 MG/1
5 TABLET ORAL EVERY EVENING
Qty: 30 TABLET | Refills: 3 | Status: SHIPPED | OUTPATIENT
Start: 2024-04-26

## 2024-04-26 SDOH — ECONOMIC STABILITY: HOUSING INSECURITY
IN THE LAST 12 MONTHS, WAS THERE A TIME WHEN YOU DID NOT HAVE A STEADY PLACE TO SLEEP OR SLEPT IN A SHELTER (INCLUDING NOW)?: NO

## 2024-04-26 SDOH — ECONOMIC STABILITY: FOOD INSECURITY: WITHIN THE PAST 12 MONTHS, THE FOOD YOU BOUGHT JUST DIDN'T LAST AND YOU DIDN'T HAVE MONEY TO GET MORE.: NEVER TRUE

## 2024-04-26 SDOH — ECONOMIC STABILITY: FOOD INSECURITY: WITHIN THE PAST 12 MONTHS, YOU WORRIED THAT YOUR FOOD WOULD RUN OUT BEFORE YOU GOT MONEY TO BUY MORE.: NEVER TRUE

## 2024-04-26 SDOH — ECONOMIC STABILITY: INCOME INSECURITY: HOW HARD IS IT FOR YOU TO PAY FOR THE VERY BASICS LIKE FOOD, HOUSING, MEDICAL CARE, AND HEATING?: NOT HARD AT ALL

## 2024-04-26 NOTE — CARE COORDINATION
ACM attempted to contact patient to offer enrollment into Care Coordination. ACM left a voice message with contact information requesting a return call.     PLAN  -If no return call, continue to attempt to contact patient.   -Introduction letter mailed with [facility] brochure

## 2024-04-26 NOTE — PROGRESS NOTES
4/26/2024    Jeannie Soliman is a 68 y.o. female here for   Chief Complaint   Patient presents with    Hypertension     200/91 BP at [facility] on 4/24   She was in the ED on 4/24   Started on amlodipine for norvasc  EKG done at [facility] but not available  Last EKG done 2021 and [facility] 2021   Summary   Left ventricular internal dimensions were normal in diastole and systole.   No regional wall motion abnormalities seen.   Normal left ventricular ejection fraction.   The left atrium is borderline dilated.   Mild mitral annular calcification.   The aortic valve appears mildly sclerotic.      Signature  Today,  BP: (!) 143/90 and she is asymptomatic.  BP Readings from Last 3 Encounters:   04/26/24 (!) 143/90   04/24/24 (!) 167/82   04/18/24 (!) 162/100     Patient denies chest pain, diaphoresis, dyspnea, dyspnea on exertion, peripheral edema, palpitations, headache, vision changes.    She had been taking otc cough and cold medications for sinuses.   She had a 'z pack'  She may have been takign a decongestant.   At [facility] was given clonidine. This domenica her very tired.     She was prescribed amlodipine which she has been taking. However she though she needed to stop her other medications. She is also worried about side effects.      Wt Readings from Last 3 Encounters:   04/26/24 86.2 kg (190 lb)   04/18/24 87.1 kg (192 lb)   02/05/24 85.6 kg (188 lb 12.8 oz)       She  reports that she has never smoked. She has never used smokeless tobacco.    Medications and allergies reviewed and updated in chart.    Current Outpatient Medications   Medication Sig Dispense Refill    levocetirizine (XYZAL ALLERGY 24HR) 5 MG tablet Take 1 tablet by mouth nightly      amLODIPine (NORVASC) 5 MG tablet Take 1 tablet by mouth daily 30 tablet 0    carbamide peroxide (DEBROX) 6.5 % otic solution Place 5 drops into the left ear as needed (Wax buildup) 15 mL 0    Alcohol Swabs (B-D SINGLE USE SWABS REGULAR) PADS USE AS DIRECTED 100 each 11

## 2024-05-02 ENCOUNTER — CARE COORDINATION (OUTPATIENT)
Dept: CARE COORDINATION | Age: 69
End: 2024-05-02

## 2024-05-02 NOTE — CARE COORDINATION
[facility] contacted patient to offer Care Coordination. Patient declined at this time. ACM encouraged patient to contact [facility] if she feels she needs assistance managing her health care needs.     PLAN:  Patient will be temporarily excluded from Care Coordination d/t declined enrollment.

## 2024-05-10 ENCOUNTER — TELEPHONE (OUTPATIENT)
Dept: FAMILY MEDICINE CLINIC | Age: 69
End: 2024-05-10

## 2024-05-10 NOTE — TELEPHONE ENCOUNTER
Pt called in this morning and said that she got a text from [employer] that said \"its time to schedule your diabetes check up\". Pt was just here on 4/26/2024. Her next appt isn't till August. Does pt need seen prior to that? She is planning to come for lab appointment on 15th.

## 2024-05-15 ENCOUNTER — TELEPHONE (OUTPATIENT)
Dept: FAMILY MEDICINE CLINIC | Age: 69
End: 2024-05-15

## 2024-05-15 ENCOUNTER — NURSE ONLY (OUTPATIENT)
Dept: FAMILY MEDICINE CLINIC | Age: 69
End: 2024-05-15

## 2024-05-15 VITALS — DIASTOLIC BLOOD PRESSURE: 78 MMHG | SYSTOLIC BLOOD PRESSURE: 130 MMHG

## 2024-05-15 NOTE — TELEPHONE ENCOUNTER
Please let pt know that her blood pressure is good  Please continue amlodipine and chlorthalidone.

## 2024-05-15 NOTE — PROGRESS NOTES
Patient here for Blood Pressure Check  Vitals:    05/15/24 1037   BP: 130/78       If CHANGES are needed please open this encounter, place orders or make changes as needed and route back to the clinical pool.

## 2024-05-24 RX ORDER — LEVOTHYROXINE SODIUM 0.03 MG/1
25 TABLET ORAL DAILY
Qty: 90 TABLET | Refills: 0 | Status: SHIPPED | OUTPATIENT
Start: 2024-05-24

## 2024-05-24 NOTE — TELEPHONE ENCOUNTER
Refill request    Pts endo physician has retired- was prescribing her thyroid medication.  (Dr Segura)    Needs refill of Levothyroxine 25mg sent to [facility] @ [address]    Last Appointment   4/26/2024  Next Appointment  8/2/2024    Please advise

## 2024-05-26 DIAGNOSIS — I10 ESSENTIAL HYPERTENSION: Chronic | ICD-10-CM

## 2024-05-28 RX ORDER — CHLORTHALIDONE 25 MG/1
25 TABLET ORAL DAILY
Qty: 90 TABLET | Refills: 1 | Status: SHIPPED | OUTPATIENT
Start: 2024-05-28

## 2024-06-01 LAB — HBA1C MFR BLD HPLC: 6.5 %

## 2024-07-05 RX ORDER — BLOOD SUGAR DIAGNOSTIC
STRIP MISCELLANEOUS
Qty: 100 STRIP | Refills: 5 | Status: SHIPPED | OUTPATIENT
Start: 2024-07-05

## 2024-08-02 ENCOUNTER — OFFICE VISIT (OUTPATIENT)
Dept: FAMILY MEDICINE CLINIC | Age: 69
End: 2024-08-02

## 2024-08-02 VITALS
HEART RATE: 62 BPM | TEMPERATURE: 97.3 F | SYSTOLIC BLOOD PRESSURE: 151 MMHG | RESPIRATION RATE: 16 BRPM | BODY MASS INDEX: 29.35 KG/M2 | WEIGHT: 187 LBS | OXYGEN SATURATION: 95 % | HEIGHT: 67 IN | DIASTOLIC BLOOD PRESSURE: 86 MMHG

## 2024-08-02 DIAGNOSIS — R73.03 PREDIABETES: ICD-10-CM

## 2024-08-02 DIAGNOSIS — I10 ESSENTIAL HYPERTENSION: Primary | ICD-10-CM

## 2024-08-02 DIAGNOSIS — Z53.20 BREAST SCREENING DECLINED: ICD-10-CM

## 2024-08-02 DIAGNOSIS — Z86.39 HISTORY OF DIABETES MELLITUS: ICD-10-CM

## 2024-08-02 RX ORDER — ALISKIREN 300 MG/1
300 TABLET, FILM COATED ORAL DAILY
COMMUNITY
Start: 2024-07-18

## 2024-08-02 NOTE — PROGRESS NOTES
8/2/2024    Jeannie Soliman is a 68 y.o. female here for   Chief Complaint   Patient presents with    Hypertension     Regarding hypertension. Patient is  monitoring home blood pressures. Typically higher int he morning, lower in the afternoon. She takes her medication in the late morning.    She recently saw nephrology. However it is not clear that she told Dr Dodd that she was off amlodipine.  She sometimes takes chlorthalidone every other day. Stopped taking amlodipine after 3 days due to nausea/ vomiting \"like I was having a heart attack'. She is generally suspicious of medications and does not want to take any more medications. She thinks that this blood pressure issue for which she was sent to the ED was a one time issue. However she does report her bps are higher than 140 in the morning. She is back to taking tektruna.  Previous side effects from toprol and amlodipine.  Today,  BP: (!) 151/86 and she is asymptomatic.  BP Readings from Last 3 Encounters:   08/02/24 (!) 151/86   05/15/24 130/78   04/26/24 (!) 150/70     Patient denies chest pain, diaphoresis, dyspnea, dyspnea on exertion, peripheral edema, palpitations, headache, vision changes.    Lab Results   Component Value Date/Time    LABA1C 6.0 10/05/2023 12:00 AM    LABA1C 6.0 09/30/2022 09:49 AM    LABA1C 6.2 06/14/2022 12:15 PM     Other risk factors include prediabetes, hyperlipidemia.   She dos not want anything that will affect her brain.    Long discussion fo the risk of stroke/ chronic ischemic changes/ vascular dmentia with uncontrolled bp.    Wt Readings from Last 3 Encounters:   08/02/24 84.8 kg (187 lb)   04/26/24 86.2 kg (190 lb)   04/18/24 87.1 kg (192 lb)       She  reports that she has never smoked. She has never used smokeless tobacco.    Medications and allergies reviewed and updated in chart.    Current Outpatient Medications   Medication Sig Dispense Refill    aliskiren (TEKTURNA) 300 MG tablet Take 1 tablet by mouth daily      blood

## 2024-08-20 RX ORDER — LEVOCETIRIZINE DIHYDROCHLORIDE 5 MG/1
5 TABLET, FILM COATED ORAL NIGHTLY
Qty: 90 TABLET | Refills: 0 | Status: SHIPPED | OUTPATIENT
Start: 2024-08-20

## 2024-08-27 RX ORDER — LEVOTHYROXINE SODIUM 25 UG/1
25 TABLET ORAL DAILY
Qty: 90 TABLET | Refills: 0 | Status: SHIPPED
Start: 2024-08-27 | End: 2024-08-30 | Stop reason: SDUPTHER

## 2024-08-29 RX ORDER — LANCETS 33 GAUGE
EACH MISCELLANEOUS
Qty: 100 EACH | Refills: 5 | Status: SHIPPED | OUTPATIENT
Start: 2024-08-29

## 2024-08-30 RX ORDER — LEVOTHYROXINE SODIUM 25 UG/1
25 TABLET ORAL DAILY
Qty: 90 TABLET | Refills: 1 | Status: SHIPPED | OUTPATIENT
Start: 2024-08-30

## 2024-09-04 ENCOUNTER — LAB (OUTPATIENT)
Dept: FAMILY MEDICINE CLINIC | Age: 69
End: 2024-09-04

## 2024-09-04 ENCOUNTER — TELEPHONE (OUTPATIENT)
Dept: FAMILY MEDICINE CLINIC | Age: 69
End: 2024-09-04

## 2024-09-04 VITALS — SYSTOLIC BLOOD PRESSURE: 144 MMHG | DIASTOLIC BLOOD PRESSURE: 78 MMHG

## 2024-09-04 NOTE — TELEPHONE ENCOUNTER
Patient here for Blood Pressure Check:    152/84  144/78    Verified medications were taken this morning, also scanned home BP log into chart for provider review

## 2024-09-05 NOTE — TELEPHONE ENCOUNTER
Please call patient  I reviewed her BP check here and her home blood pressure logs.     I am still a bit concerned about her blood pressures though I see her home blood pressures are sometimes good.     Keep taking the chlorthalidone and tekturna every day and keep monitoring home blood pressures.     Bring in blood pressure log again to next visit.     Watch salt in diet.

## 2024-09-09 ENCOUNTER — TELEPHONE (OUTPATIENT)
Dept: FAMILY MEDICINE CLINIC | Age: 69
End: 2024-09-09

## 2024-09-09 DIAGNOSIS — I10 PRIMARY HYPERTENSION: Primary | ICD-10-CM

## 2024-09-10 RX ORDER — BLOOD PRESSURE TEST KIT
KIT MISCELLANEOUS
Qty: 1 KIT | Refills: 0 | Status: SHIPPED | OUTPATIENT
Start: 2024-09-10

## 2024-11-01 ENCOUNTER — OFFICE VISIT (OUTPATIENT)
Dept: FAMILY MEDICINE CLINIC | Age: 69
End: 2024-11-01

## 2024-11-01 VITALS
BODY MASS INDEX: 28.41 KG/M2 | SYSTOLIC BLOOD PRESSURE: 147 MMHG | HEIGHT: 67 IN | DIASTOLIC BLOOD PRESSURE: 78 MMHG | RESPIRATION RATE: 16 BRPM | WEIGHT: 181 LBS

## 2024-11-01 DIAGNOSIS — Z12.31 ENCOUNTER FOR SCREENING MAMMOGRAM FOR MALIGNANT NEOPLASM OF BREAST: ICD-10-CM

## 2024-11-01 DIAGNOSIS — E11.9 TYPE 2 DIABETES MELLITUS WITHOUT COMPLICATION, UNSPECIFIED WHETHER LONG TERM INSULIN USE (HCC): ICD-10-CM

## 2024-11-01 DIAGNOSIS — R53.83 OTHER FATIGUE: ICD-10-CM

## 2024-11-01 DIAGNOSIS — Z00.00 MEDICARE ANNUAL WELLNESS VISIT, SUBSEQUENT: Primary | ICD-10-CM

## 2024-11-01 DIAGNOSIS — I10 PRIMARY HYPERTENSION: ICD-10-CM

## 2024-11-01 DIAGNOSIS — I10 ESSENTIAL HYPERTENSION: Chronic | ICD-10-CM

## 2024-11-01 DIAGNOSIS — R06.02 SHORTNESS OF BREATH: ICD-10-CM

## 2024-11-01 DIAGNOSIS — E78.2 MIXED HYPERLIPIDEMIA: Chronic | ICD-10-CM

## 2024-11-01 DIAGNOSIS — R80.9 MICROALBUMINURIA: ICD-10-CM

## 2024-11-01 LAB
CREATININE URINE POCT: 10
CREATININE URINE: 26 MG/DL (ref 29–226)
HBA1C MFR BLD: 5.9 %
MICROALBUMIN/CREAT 24H UR: 10 MG/DL
MICROALBUMIN/CREAT UR-RTO: NORMAL MG/G
TOTAL PROTEIN, URINE: <4 MG/DL (ref 0–12)
URINE TOTAL PROTEIN CREATININE RATIO: ABNORMAL (ref 0–0.2)

## 2024-11-01 RX ORDER — ALISKIREN 300 MG/1
300 TABLET, FILM COATED ORAL DAILY
Qty: 30 TABLET | Refills: 3 | Status: SHIPPED | OUTPATIENT
Start: 2024-11-01

## 2024-11-01 RX ORDER — ATENOLOL 25 MG/1
25 TABLET ORAL DAILY
Qty: 30 TABLET | Refills: 3 | Status: SHIPPED | OUTPATIENT
Start: 2024-11-01

## 2024-11-01 RX ORDER — ATORVASTATIN CALCIUM 10 MG/1
10 TABLET, FILM COATED ORAL DAILY
COMMUNITY
Start: 2024-08-04

## 2024-11-01 RX ORDER — CHLORTHALIDONE 25 MG/1
25 TABLET ORAL DAILY
Qty: 90 TABLET | Refills: 1 | Status: SHIPPED | OUTPATIENT
Start: 2024-11-01

## 2024-11-01 RX ORDER — ATENOLOL 25 MG/1
25 TABLET ORAL DAILY
Qty: 90 TABLET | Refills: 0 | OUTPATIENT
Start: 2024-11-01

## 2024-11-01 ASSESSMENT — PATIENT HEALTH QUESTIONNAIRE - PHQ9
SUM OF ALL RESPONSES TO PHQ QUESTIONS 1-9: 0
SUM OF ALL RESPONSES TO PHQ QUESTIONS 1-9: 0
2. FEELING DOWN, DEPRESSED OR HOPELESS: NOT AT ALL
SUM OF ALL RESPONSES TO PHQ QUESTIONS 1-9: 0
SUM OF ALL RESPONSES TO PHQ QUESTIONS 1-9: 0
SUM OF ALL RESPONSES TO PHQ9 QUESTIONS 1 & 2: 0
1. LITTLE INTEREST OR PLEASURE IN DOING THINGS: NOT AT ALL

## 2024-11-01 NOTE — PROGRESS NOTES
Medicare Annual Wellness Visit    Jeannie Soliman is here for Medicare AWV and Other (Declined flu shot)    Assessment & Plan   Medicare annual wellness visit, subsequent  Mammogram ordered  Declined vaccines  Eye exam up to date- February     Type 2 diabetes mellitus without complication, unspecified whether long term insulin use (HCC)  A1c well controlled  Lipids not at goal  Bp not at goal  Microalbuminuria present  Quantify proteinuria  Address bp  -     POCT glycosylated hemoglobin (Hb A1C)  -     Exercise Stress Test; Future  -     POCT microalbumin  -     Protein / creatinine ratio, urine  Essential hypertension  With symptoms of dyspnea with exertion and fatigue  Nonspecific but high preexisting ascvd risk  Check treadmill stress test  Cont chlorthalidone/ tekturna  Add low dose atenolol with caution given 1st degree avb  Repeat EKG at f/u  -     chlorthalidone (HYGROTON) 25 MG tablet; Take 1 tablet by mouth daily, Disp-90 tablet, R-1Normal  -     Exercise Stress Test; Future  -     EKG 12 lead; Future  Shortness of breath  As above  Check treadmill stress test  -     Exercise Stress Test; Future  -     EKG 12 lead; Future  Other fatigue  Primary hypertension  Mixed hyperlipidemia  -     Exercise Stress Test; Future  Microalbuminuria  -     Protein / creatinine ratio, urine      Recommendations for Preventive Services Due: see orders and patient instructions/AVS.  Recommended screening schedule for the next 5-10 years is provided to the patient in written form: see Patient Instructions/AVS.     Return for Medicare Annual Wellness Visit in 1 year.     Subjective   The following acute and/or chronic problems were also addressed today:  Patient reports high levels of stress.  Relates to election season, things breaking down in her home. Requiring repairs, etc.  She has been very tired.  She denies depression. Does not wish to see a counselor.   She is sometimes short of breath with activity.   Wonders if

## 2024-11-06 ENCOUNTER — TELEPHONE (OUTPATIENT)
Dept: CARDIOLOGY | Age: 69
End: 2024-11-06

## 2024-11-06 NOTE — TELEPHONE ENCOUNTER
Left message on voice mail to remind patient of regular stress test appointment on 11/8/24 at 0730. Instructions for test and to hold Atenolol for 24 hours before test left on voice mail. Asked patient to call with any questions or if unable to keep appointment.

## 2024-11-08 ENCOUNTER — HOSPITAL ENCOUNTER (OUTPATIENT)
Dept: CARDIOLOGY | Age: 69
Discharge: HOME | End: 2024-11-10
Payer: MEDICARE

## 2024-11-08 VITALS
BODY MASS INDEX: 29.09 KG/M2 | WEIGHT: 181 LBS | DIASTOLIC BLOOD PRESSURE: 78 MMHG | SYSTOLIC BLOOD PRESSURE: 130 MMHG | HEIGHT: 66 IN

## 2024-11-08 DIAGNOSIS — R06.02 SHORTNESS OF BREATH: ICD-10-CM

## 2024-11-08 DIAGNOSIS — I10 ESSENTIAL HYPERTENSION: Chronic | ICD-10-CM

## 2024-11-08 DIAGNOSIS — E11.9 TYPE 2 DIABETES MELLITUS WITHOUT COMPLICATION, UNSPECIFIED WHETHER LONG TERM INSULIN USE (HCC): ICD-10-CM

## 2024-11-08 DIAGNOSIS — E78.2 MIXED HYPERLIPIDEMIA: Chronic | ICD-10-CM

## 2024-11-08 LAB
ECHO BSA: 1.96 M2
STRESS BASELINE DIAS BP: 78 MMHG
STRESS BASELINE HR: 70 BPM
STRESS BASELINE SYS BP: 130 MMHG
STRESS ESTIMATED WORKLOAD: 5.4 METS
STRESS EXERCISE DUR MIN: 4 MIN
STRESS O2 SAT PEAK: 100 %
STRESS O2 SAT REST: 97 %
STRESS PEAK DIAS BP: 92 MMHG
STRESS PEAK SYS BP: 190 MMHG
STRESS PERCENT HR ACHIEVED: 91 %
STRESS POST PEAK HR: 136 BPM
STRESS RATE PRESSURE PRODUCT: NORMAL BPM*MMHG
STRESS TARGET HR: 150 BPM

## 2024-11-08 PROCEDURE — 93017 CV STRESS TEST TRACING ONLY: CPT

## 2024-11-08 PROCEDURE — 93016 CV STRESS TEST SUPVJ ONLY: CPT | Performed by: INTERNAL MEDICINE

## 2024-11-08 PROCEDURE — 93018 CV STRESS TEST I&R ONLY: CPT | Performed by: INTERNAL MEDICINE

## 2024-11-14 ENCOUNTER — TELEPHONE (OUTPATIENT)
Dept: FAMILY MEDICINE CLINIC | Age: 69
End: 2024-11-14

## 2024-11-14 DIAGNOSIS — E78.01 FAMILIAL HYPERCHOLESTEROLEMIA: ICD-10-CM

## 2024-11-14 DIAGNOSIS — I10 HYPERTENSION, UNSPECIFIED TYPE: ICD-10-CM

## 2024-11-14 DIAGNOSIS — R94.39 ABNORMAL STRESS TEST: Primary | ICD-10-CM

## 2024-11-14 NOTE — TELEPHONE ENCOUNTER
Pt had stress test done and called in to schedule f/u appt with PCP. PCP first available appt in January- pt wanted to make sure dr grace had reviewed her results and to make sure that she doesn't want see her sooner than 1/3

## 2024-11-15 NOTE — TELEPHONE ENCOUNTER
See result notes - patients stress test was borderline/ indeterminate.   Recommend cardiology consult and f/u in office - offer appointment with available provider and see if she is okay with cardiology consult

## 2024-11-19 NOTE — TELEPHONE ENCOUNTER
Referral placed. Please offer her appointment her sooner if she would like. Also if any chest pain, shortness of breath, nausea/ arm pain that lasts more than 10 minutes, go to the ED

## 2024-11-26 ENCOUNTER — TELEPHONE (OUTPATIENT)
Dept: CARDIOLOGY CLINIC | Age: 69
End: 2024-11-26

## 2024-12-05 ENCOUNTER — TELEPHONE (OUTPATIENT)
Dept: FAMILY MEDICINE CLINIC | Age: 69
End: 2024-12-05

## 2024-12-26 ENCOUNTER — OFFICE VISIT (OUTPATIENT)
Dept: CARDIOLOGY CLINIC | Age: 69
End: 2024-12-26
Payer: MEDICARE

## 2024-12-26 ENCOUNTER — TELEPHONE (OUTPATIENT)
Dept: FAMILY MEDICINE CLINIC | Age: 69
End: 2024-12-26

## 2024-12-26 VITALS
WEIGHT: 190.8 LBS | DIASTOLIC BLOOD PRESSURE: 88 MMHG | HEIGHT: 67 IN | BODY MASS INDEX: 29.95 KG/M2 | SYSTOLIC BLOOD PRESSURE: 132 MMHG | HEART RATE: 68 BPM

## 2024-12-26 DIAGNOSIS — I49.3 PVC (PREMATURE VENTRICULAR CONTRACTION): Primary | ICD-10-CM

## 2024-12-26 DIAGNOSIS — I10 ESSENTIAL HYPERTENSION: ICD-10-CM

## 2024-12-26 DIAGNOSIS — E78.2 MIXED HYPERLIPIDEMIA: ICD-10-CM

## 2024-12-26 DIAGNOSIS — Z87.898 HISTORY OF PALPITATIONS: ICD-10-CM

## 2024-12-26 DIAGNOSIS — E11.9 TYPE 2 DIABETES MELLITUS WITHOUT COMPLICATION, UNSPECIFIED WHETHER LONG TERM INSULIN USE (HCC): ICD-10-CM

## 2024-12-26 DIAGNOSIS — I49.9 IRREGULAR HEARTBEAT: ICD-10-CM

## 2024-12-26 PROCEDURE — 1123F ACP DISCUSS/DSCN MKR DOCD: CPT | Performed by: INTERNAL MEDICINE

## 2024-12-26 PROCEDURE — 3075F SYST BP GE 130 - 139MM HG: CPT | Performed by: INTERNAL MEDICINE

## 2024-12-26 PROCEDURE — 99204 OFFICE O/P NEW MOD 45 MIN: CPT | Performed by: INTERNAL MEDICINE

## 2024-12-26 PROCEDURE — 93000 ELECTROCARDIOGRAM COMPLETE: CPT | Performed by: INTERNAL MEDICINE

## 2024-12-26 PROCEDURE — 3079F DIAST BP 80-89 MM HG: CPT | Performed by: INTERNAL MEDICINE

## 2024-12-26 PROCEDURE — 3044F HG A1C LEVEL LT 7.0%: CPT | Performed by: INTERNAL MEDICINE

## 2024-12-26 NOTE — PROGRESS NOTES
in each nostril as directed 60 mL 1    Handicap Placard MISC Expiration 8/2025 1 each 0    polyethylene glycol (GLYCOLAX) 17 GM/SCOOP powder MIX 1 CAPFUL(17GM) IN A BEVERAGE AND DRINK EVERY DAY (Patient not taking: Reported on 8/2/2024) 510 g 11    triamcinolone (KENALOG) 0.1 % cream  (Patient not taking: Reported on 8/2/2024)       No current facility-administered medications for this visit.       Social History     Socioeconomic History    Marital status:      Spouse name: Not on file    Number of children: Not on file    Years of education: Not on file    Highest education level: Not on file   Occupational History     Employer: NOT EMPLOYED   Tobacco Use    Smoking status: Never    Smokeless tobacco: Never   Vaping Use    Vaping status: Never Used   Substance and Sexual Activity    Alcohol use: No     Alcohol/week: 0.0 standard drinks of alcohol    Drug use: No    Sexual activity: Not Currently   Other Topics Concern    Not on file   Social History Narrative    Not on file     Social Determinants of Health     Financial Resource Strain: Low Risk  (4/26/2024)    Overall Financial Resource Strain (CARDIA)     Difficulty of Paying Living Expenses: Not hard at all   Food Insecurity: No Food Insecurity (4/26/2024)    Hunger Vital Sign     Worried About Running Out of Food in the Last Year: Never true     Ran Out of Food in the Last Year: Never true   Transportation Needs: Unknown (4/26/2024)    PRAPARE - Transportation     Lack of Transportation (Medical): Not on file     Lack of Transportation (Non-Medical): No   Physical Activity: Sufficiently Active (11/1/2024)    Exercise Vital Sign     Days of Exercise per Week: 7 days     Minutes of Exercise per Session: 30 min   Stress: Not on file   Social Connections: Not on file   Intimate Partner Violence: Not on file   Housing Stability: Unknown (4/26/2024)    Housing Stability Vital Sign     Unable to Pay for Housing in the Last Year: Not on file     Number of

## 2025-01-03 ENCOUNTER — OFFICE VISIT (OUTPATIENT)
Dept: FAMILY MEDICINE CLINIC | Age: 70
End: 2025-01-03

## 2025-01-03 VITALS
RESPIRATION RATE: 16 BRPM | BODY MASS INDEX: 29.09 KG/M2 | HEIGHT: 66 IN | TEMPERATURE: 97.1 F | OXYGEN SATURATION: 95 % | SYSTOLIC BLOOD PRESSURE: 131 MMHG | WEIGHT: 181 LBS | DIASTOLIC BLOOD PRESSURE: 78 MMHG | HEART RATE: 71 BPM

## 2025-01-03 DIAGNOSIS — E78.01 FAMILIAL HYPERCHOLESTEROLEMIA: ICD-10-CM

## 2025-01-03 DIAGNOSIS — R94.31 ABNORMAL EKG: ICD-10-CM

## 2025-01-03 DIAGNOSIS — I10 HYPERTENSION, UNSPECIFIED TYPE: Primary | ICD-10-CM

## 2025-01-03 RX ORDER — LEVOTHYROXINE SODIUM 25 UG/1
25 TABLET ORAL DAILY
Qty: 90 TABLET | Refills: 1 | Status: SHIPPED | OUTPATIENT
Start: 2025-01-03

## 2025-01-03 RX ORDER — ATENOLOL 25 MG/1
25 TABLET ORAL DAILY
Qty: 90 TABLET | Refills: 1 | Status: SHIPPED | OUTPATIENT
Start: 2025-01-03

## 2025-01-03 RX ORDER — ALISKIREN 300 MG/1
300 TABLET, FILM COATED ORAL DAILY
Qty: 90 TABLET | Refills: 1 | Status: SHIPPED | OUTPATIENT
Start: 2025-01-03

## 2025-01-03 ASSESSMENT — PATIENT HEALTH QUESTIONNAIRE - PHQ9
2. FEELING DOWN, DEPRESSED OR HOPELESS: NOT AT ALL
SUM OF ALL RESPONSES TO PHQ QUESTIONS 1-9: 0
SUM OF ALL RESPONSES TO PHQ9 QUESTIONS 1 & 2: 0
SUM OF ALL RESPONSES TO PHQ QUESTIONS 1-9: 0
1. LITTLE INTEREST OR PLEASURE IN DOING THINGS: NOT AT ALL

## 2025-01-03 NOTE — PROGRESS NOTES
1/3/2025    Jeannie Soliman is a 69 y.o. female here for   Chief Complaint   Patient presents with    Hypertension    Other     Declined flu shot     Regarding hypertension. Patient is  monitoring home blood pressures.   Cardiovascular risk factors: advanced age (older than 55 for men, 65 for women), diabetes mellitus, dyslipidemia, hypertension, obesity (BMI >= 30 kg/m2), and sedentary lifestyle.  Patient does not smoke.  Currently on atenolol, aliskiren, chlorthalidone. Taking as prescribed. No adverse effects.    Today,  BP: 131/78 and she is asymptomatic.  BP Readings from Last 3 Encounters:   01/03/25 (!) 141/79   12/26/24 132/88   11/08/24 130/78     Patient denies chest pain, diaphoresis, dyspnea, dyspnea on exertion, peripheral edema, palpitations, headache, vision changes.    She was told by cardiology that she should have her blood pressure under better control - 130.   She has not had any symptoms  She is tolerating her low dose of statin - atorvastatin 10 mg daily.    Of note last A1c was under control    She followed up with cardiology regarding abnormal EKG and palpitations  She ahs ECHO ordered and pending.   She has been feeling okay.      Wt Readings from Last 3 Encounters:   01/03/25 82.1 kg (181 lb)   12/26/24 86.5 kg (190 lb 12.8 oz)   11/08/24 82.1 kg (181 lb)       She  reports that she has never smoked. She has never used smokeless tobacco.    Medications and allergies reviewed and updated in chart.    Current Outpatient Medications   Medication Sig Dispense Refill    atorvastatin (LIPITOR) 10 MG tablet Take 1 tablet by mouth daily      atenolol (TENORMIN) 25 MG tablet Take 1 tablet by mouth daily 30 tablet 3    chlorthalidone (HYGROTON) 25 MG tablet Take 1 tablet by mouth daily 90 tablet 1    aliskiren (TEKTURNA) 300 MG tablet Take 1 tablet by mouth daily 30 tablet 3    Blood Pressure KIT Use daily as directed. Upper arm kit. Dx: hypertension 1 kit 0    levothyroxine (SYNTHROID) 25 MCG tablet

## 2025-02-25 ENCOUNTER — TELEPHONE (OUTPATIENT)
Dept: CARDIOLOGY | Age: 70
End: 2025-02-25

## 2025-02-25 NOTE — TELEPHONE ENCOUNTER
called and canceled echo - does not want to reschedule     Electronically signed by Carlee Crocker on 2/25/2025 at 12:44 PM

## 2025-02-28 RX ORDER — LEVOTHYROXINE SODIUM 25 UG/1
25 TABLET ORAL DAILY
Qty: 90 TABLET | Refills: 1 | OUTPATIENT
Start: 2025-02-28

## 2025-03-18 RX ORDER — LEVOTHYROXINE SODIUM 25 UG/1
25 TABLET ORAL DAILY
Qty: 90 TABLET | Refills: 1 | Status: SHIPPED | OUTPATIENT
Start: 2025-03-18

## 2025-03-18 NOTE — TELEPHONE ENCOUNTER
Name of Medication(s) Requested:  Requested Prescriptions      No prescriptions requested or ordered in this encounter       Medication is on current medication list Yes    Dosage and directions were verified? Yes    Quantity verified: 90 day supply     Pharmacy Verified?  Yes    Last Appointment:  1/3/2025    Future appts:  Future Appointments   Date Time Provider Department Center   4/10/2025 11:00 AM Lindsey Shearer MD [facility]   6/12/2025 11:15 AM James Swanson MD [facility]        (If no appt send self scheduling link. .REFILLAPPT)  Scheduling request sent?     [] Yes  [x] No    Does patient need updated?  [x] Yes  [] No

## 2025-03-18 NOTE — TELEPHONE ENCOUNTER
Refill request    Levothyroxine 25mcg  90 tablets 3 refills    Jonn @ [place]     Last Appointment   1/3/2025  Next Appointment  4/10/2025

## 2025-03-31 ENCOUNTER — OFFICE VISIT (OUTPATIENT)
Dept: FAMILY MEDICINE CLINIC | Age: 70
End: 2025-03-31
Payer: MEDICARE

## 2025-03-31 VITALS
HEIGHT: 66 IN | HEART RATE: 69 BPM | RESPIRATION RATE: 17 BRPM | TEMPERATURE: 97.9 F | OXYGEN SATURATION: 98 % | DIASTOLIC BLOOD PRESSURE: 80 MMHG | WEIGHT: 181 LBS | BODY MASS INDEX: 29.09 KG/M2 | SYSTOLIC BLOOD PRESSURE: 160 MMHG

## 2025-03-31 DIAGNOSIS — R68.89 FLU-LIKE SYMPTOMS: ICD-10-CM

## 2025-03-31 DIAGNOSIS — H65.93 BILATERAL SEROUS OTITIS MEDIA, UNSPECIFIED CHRONICITY: ICD-10-CM

## 2025-03-31 DIAGNOSIS — B34.9 VIRAL ILLNESS: Primary | ICD-10-CM

## 2025-03-31 DIAGNOSIS — H61.22 IMPACTED CERUMEN OF LEFT EAR: ICD-10-CM

## 2025-03-31 LAB
INFLUENZA A ANTIBODY: NEGATIVE
INFLUENZA B ANTIBODY: NEGATIVE
Lab: NORMAL
PERFORMING INSTRUMENT: NORMAL
QC PASS/FAIL: NORMAL
S PYO AG THROAT QL: NORMAL
SARS-COV-2, POC: NORMAL

## 2025-03-31 PROCEDURE — 87804 INFLUENZA ASSAY W/OPTIC: CPT | Performed by: NURSE PRACTITIONER

## 2025-03-31 PROCEDURE — 87426 SARSCOV CORONAVIRUS AG IA: CPT | Performed by: NURSE PRACTITIONER

## 2025-03-31 PROCEDURE — 3079F DIAST BP 80-89 MM HG: CPT | Performed by: NURSE PRACTITIONER

## 2025-03-31 PROCEDURE — 87880 STREP A ASSAY W/OPTIC: CPT | Performed by: NURSE PRACTITIONER

## 2025-03-31 PROCEDURE — 1123F ACP DISCUSS/DSCN MKR DOCD: CPT | Performed by: NURSE PRACTITIONER

## 2025-03-31 PROCEDURE — 3077F SYST BP >= 140 MM HG: CPT | Performed by: NURSE PRACTITIONER

## 2025-03-31 PROCEDURE — 69209 REMOVE IMPACTED EAR WAX UNI: CPT | Performed by: NURSE PRACTITIONER

## 2025-03-31 PROCEDURE — 99213 OFFICE O/P EST LOW 20 MIN: CPT | Performed by: NURSE PRACTITIONER

## 2025-03-31 RX ORDER — PREDNISOLONE ACETATE 10 MG/ML
SUSPENSION/ DROPS OPHTHALMIC
COMMUNITY
Start: 2024-11-05

## 2025-03-31 RX ORDER — CLONIDINE HYDROCHLORIDE 0.1 MG/1
TABLET ORAL
COMMUNITY
Start: 2024-11-20

## 2025-03-31 NOTE — PROGRESS NOTES
Congestion, cough itchy watery eyes    Penicillins Rash       Social History:     Social History     Tobacco Use    Smoking status: Never    Smokeless tobacco: Never   Vaping Use    Vaping status: Never Used   Substance Use Topics    Alcohol use: No     Alcohol/week: 0.0 standard drinks of alcohol    Drug use: No       Physical Exam:     Vitals:    03/31/25 1329 03/31/25 1334   BP: (!) 160/80  Comment: patient said she also just came from Yoga class today, but had her Bp meds about 9am today. (!) 160/80   BP Site: Right Upper Arm Left Upper Arm   Patient Position: Sitting Sitting   BP Cuff Size: Large Adult Large Adult   Pulse: 69    Resp: 17    Temp: 97.9 °F (36.6 °C)    TempSrc: Temporal    SpO2: 98%    Weight: 82.1 kg (181 lb)    Height: 1.676 m (5' 6\")        Physical Exam (PE)    Physical Exam  Constitutional:       Appearance: Normal appearance.   HENT:      Head: Normocephalic.      Right Ear: Ear canal and external ear normal. A middle ear effusion is present.      Left Ear: External ear normal. There is impacted cerumen.      Ears:      Comments: Cerumen (white debris) removal performed via lavage on the left ear. Post Removal: Tympanic membrane intact with serous fluid.     Nose: Congestion and rhinorrhea present.      Mouth/Throat:      Mouth: Mucous membranes are moist.      Pharynx: Oropharynx is clear. No oropharyngeal exudate or posterior oropharyngeal erythema.   Eyes:      Pupils: Pupils are equal, round, and reactive to light.   Cardiovascular:      Rate and Rhythm: Normal rate and regular rhythm.      Pulses: Normal pulses.      Heart sounds: Normal heart sounds.   Pulmonary:      Effort: Pulmonary effort is normal.      Breath sounds: Normal breath sounds. No wheezing, rhonchi or rales.   Abdominal:      General: Bowel sounds are normal.      Palpations: Abdomen is soft.   Musculoskeletal:         General: Normal range of motion.      Cervical back: Normal range of motion and neck supple.

## 2025-04-10 ENCOUNTER — OFFICE VISIT (OUTPATIENT)
Dept: FAMILY MEDICINE CLINIC | Age: 70
End: 2025-04-10

## 2025-04-10 VITALS
HEIGHT: 66 IN | WEIGHT: 181 LBS | OXYGEN SATURATION: 97 % | SYSTOLIC BLOOD PRESSURE: 138 MMHG | HEART RATE: 67 BPM | TEMPERATURE: 97.6 F | BODY MASS INDEX: 29.09 KG/M2 | DIASTOLIC BLOOD PRESSURE: 80 MMHG | RESPIRATION RATE: 15 BRPM

## 2025-04-10 DIAGNOSIS — F41.9 ANXIETY: ICD-10-CM

## 2025-04-10 DIAGNOSIS — E11.9 TYPE 2 DIABETES MELLITUS WITHOUT COMPLICATION, UNSPECIFIED WHETHER LONG TERM INSULIN USE: Primary | ICD-10-CM

## 2025-04-10 DIAGNOSIS — E03.9 HYPOTHYROIDISM, UNSPECIFIED TYPE: Chronic | ICD-10-CM

## 2025-04-10 DIAGNOSIS — F40.243 FEAR OF FLYING: ICD-10-CM

## 2025-04-10 DIAGNOSIS — I10 ESSENTIAL HYPERTENSION: Chronic | ICD-10-CM

## 2025-04-10 LAB — HBA1C MFR BLD: 6.1 %

## 2025-04-10 RX ORDER — LORAZEPAM 0.5 MG/1
0.5 TABLET ORAL
Qty: 4 TABLET | Refills: 0 | Status: SHIPPED | OUTPATIENT
Start: 2025-04-10 | End: 2025-06-09

## 2025-04-10 RX ORDER — CHLORTHALIDONE 25 MG/1
25 TABLET ORAL DAILY
Qty: 90 TABLET | Refills: 1 | Status: SHIPPED | OUTPATIENT
Start: 2025-04-10

## 2025-04-10 SDOH — ECONOMIC STABILITY: FOOD INSECURITY: WITHIN THE PAST 12 MONTHS, YOU WORRIED THAT YOUR FOOD WOULD RUN OUT BEFORE YOU GOT MONEY TO BUY MORE.: NEVER TRUE

## 2025-04-10 SDOH — ECONOMIC STABILITY: FOOD INSECURITY: WITHIN THE PAST 12 MONTHS, THE FOOD YOU BOUGHT JUST DIDN'T LAST AND YOU DIDN'T HAVE MONEY TO GET MORE.: NEVER TRUE

## 2025-04-10 NOTE — PROGRESS NOTES
4/10/2025    Jeannie Soliman is a 69 y.o. female here for   Chief Complaint   Patient presents with    Diabetes    Other     Flying to [place]. Asking for something for nerves     Her first grandson is graduating high school  She gets anxiety attacks  She is worried about flying to [place]  Has done well with low dose of lorazepam in the past - she has at least 2 flights to expect  Regarding hypertension. Patient is  monitoring home blood pressures. They are typically normal   Cardiovascular risk factors: advanced age (older than 55 for men, 65 for women), dyslipidemia, and hypertension.  Patient does not smoke.  Currently on chlorthalidone/ tekturna.  Did not tolerate atenolol; did not tolerate amlodipine.  Taking as prescribed. No adverse effects.    Today,  BP: 138/80 on repeat and she is asymptomatic.  BP Readings from Last 3 Encounters:   04/10/25 (!) 153/93   03/31/25 (!) 160/80   01/03/25 131/78     Patient denies chest pain, diaphoresis, dyspnea, dyspnea on exertion, peripheral edema, palpitations, headache, vision changes.    Thyroid - f/u with endocrine  Hyperlipidemia - f/u with endocrine    Lab Results   Component Value Date/Time    LABA1C 6.2 01/09/2025 10:21 AM    LABA1C 5.9 11/01/2024 10:22 AM    LABA1C 6.0 10/05/2023 12:00 AM   Prediabetes    CKD - follows with Dr Dodd  Recent appointment  She did not take her chlorthalidone today - it makes her urinate too much so sometimes waits until she is back at home to   Wt Readings from Last 3 Encounters:   04/10/25 82.1 kg (181 lb)   03/31/25 82.1 kg (181 lb)   01/03/25 82.1 kg (181 lb)       She  reports that she has never smoked. She has never used smokeless tobacco.    Medications and allergies reviewed and updated in chart.    Current Outpatient Medications   Medication Sig Dispense Refill    cloNIDine (CATAPRES) 0.1 MG tablet Take 2 tablets by oral route.      prednisoLONE acetate (PRED FORTE) 1 % ophthalmic suspension instill 1 drop TID OU X 7 days

## 2025-04-11 ENCOUNTER — RESULTS FOLLOW-UP (OUTPATIENT)
Dept: FAMILY MEDICINE CLINIC | Age: 70
End: 2025-04-11

## 2025-04-11 LAB
T4 FREE: 0.9 NG/DL (ref 0.9–1.7)
TSH SERPL DL<=0.05 MIU/L-ACNC: 5.65 UIU/ML (ref 0.27–4.2)

## 2025-05-30 DIAGNOSIS — J30.9 ALLERGIC RHINITIS, UNSPECIFIED SEASONALITY, UNSPECIFIED TRIGGER: ICD-10-CM

## 2025-05-30 RX ORDER — FLUTICASONE PROPIONATE 50 MCG
SPRAY, SUSPENSION (ML) NASAL
Qty: 16 G | Refills: 5 | Status: SHIPPED | OUTPATIENT
Start: 2025-05-30

## 2025-05-30 NOTE — TELEPHONE ENCOUNTER
Name of Medication(s) Requested:  Requested Prescriptions     Pending Prescriptions Disp Refills    fluticasone (FLONASE) 50 MCG/ACT nasal spray [Pharmacy Med Name: FLUTICASONE 50MCG NASAL SP (120) RX] 16 g 5     Sig: SHAKE LIQUID AND USE 1 SPRAY IN EACH NOSTRIL EVERY DAY AS NEEDED       Medication is on current medication list Yes    Dosage and directions were verified? Yes    Quantity verified: 90 day supply     Pharmacy Verified?  Yes    Last Appointment:  4/10/2025    Future appts:  Future Appointments   Date Time Provider Department Center   6/12/2025 11:15 AM James Swanson MD [facility]   8/14/2025 10:00 AM Lindsey Shearer MD [facility]        (If no appt send self scheduling link. .REFILLAPPT)  Scheduling request sent?     [] Yes  [x] No    Does patient need updated?  [x] Yes  [] No

## 2025-06-12 ENCOUNTER — OFFICE VISIT (OUTPATIENT)
Dept: CARDIOLOGY CLINIC | Age: 70
End: 2025-06-12
Payer: MEDICARE

## 2025-06-12 VITALS
HEART RATE: 59 BPM | OXYGEN SATURATION: 98 % | SYSTOLIC BLOOD PRESSURE: 160 MMHG | BODY MASS INDEX: 28.83 KG/M2 | TEMPERATURE: 97.5 F | DIASTOLIC BLOOD PRESSURE: 92 MMHG | WEIGHT: 179.4 LBS | HEIGHT: 66 IN | RESPIRATION RATE: 18 BRPM

## 2025-06-12 DIAGNOSIS — I10 ESSENTIAL HYPERTENSION: ICD-10-CM

## 2025-06-12 DIAGNOSIS — I50.33 ACUTE ON CHRONIC HEART FAILURE WITH PRESERVED EJECTION FRACTION (HCC): Primary | ICD-10-CM

## 2025-06-12 DIAGNOSIS — E11.9 TYPE 2 DIABETES MELLITUS WITHOUT COMPLICATION, WITHOUT LONG-TERM CURRENT USE OF INSULIN (HCC): ICD-10-CM

## 2025-06-12 DIAGNOSIS — E78.2 MIXED HYPERLIPIDEMIA: Chronic | ICD-10-CM

## 2025-06-12 PROCEDURE — G2211 COMPLEX E/M VISIT ADD ON: HCPCS | Performed by: INTERNAL MEDICINE

## 2025-06-12 PROCEDURE — 3080F DIAST BP >= 90 MM HG: CPT | Performed by: INTERNAL MEDICINE

## 2025-06-12 PROCEDURE — 3077F SYST BP >= 140 MM HG: CPT | Performed by: INTERNAL MEDICINE

## 2025-06-12 PROCEDURE — 99214 OFFICE O/P EST MOD 30 MIN: CPT | Performed by: INTERNAL MEDICINE

## 2025-06-12 PROCEDURE — 93000 ELECTROCARDIOGRAM COMPLETE: CPT | Performed by: INTERNAL MEDICINE

## 2025-06-12 PROCEDURE — 3044F HG A1C LEVEL LT 7.0%: CPT | Performed by: INTERNAL MEDICINE

## 2025-06-12 PROCEDURE — 1123F ACP DISCUSS/DSCN MKR DOCD: CPT | Performed by: INTERNAL MEDICINE

## 2025-06-12 RX ORDER — LISINOPRIL 10 MG/1
10 TABLET ORAL DAILY
Qty: 90 TABLET | OUTPATIENT
Start: 2025-06-12

## 2025-06-12 RX ORDER — LISINOPRIL 10 MG/1
10 TABLET ORAL DAILY
Qty: 60 TABLET | Refills: 1 | Status: SHIPPED | OUTPATIENT
Start: 2025-06-12

## 2025-06-12 NOTE — PATIENT INSTRUCTIONS
Continue all your medications at current doses.   Take lisinopril 10 mg daily.   Check bmp in 1 week.   Restrict sodium intake to less than 2-2.5 g/day. Restrict fluid intake to less than 2.2 L/day. Goal BP is less than 130/80.   Please try to exercise for 150 minutes a week.   I will see you back in the office in 6 months. Please call the office at [phone] [phone]-ask jerson Mcmillan) if you have any questions.

## 2025-06-12 NOTE — PROGRESS NOTES
hypertension 1 kit 0    Lancets (ONETOUCH DELICA PLUS [id number]) MISC TEST EVERY  each 5    levocetirizine (XYZAL) 5 MG tablet TAKE 1 TABLET BY MOUTH EVERY NIGHT AT BEDTIME 90 tablet 0    blood glucose test strips (ONETOUCH VERIO) strip TEST BLOOD GLUCOSE LEVELS ONCE DAILY AND AS NEEDED FOR SYMPTOMS OF IRREGULAR BLOOD GLUCOSE LEVELS 100 strip 5    polyethylene glycol (GLYCOLAX) 17 GM/SCOOP powder MIX 1 CAPFUL(17GM) IN A BEVERAGE AND DRINK EVERY  g 11    Alcohol Swabs (B-D SINGLE USE SWABS REGULAR) PADS USE AS DIRECTED 100 each 11    celecoxib (CELEBREX) 100 MG capsule Take 1 capsule by mouth 2 times daily as needed for Pain 15 capsule 0    azelastine (ASTELIN) 0.1 % nasal spray 1 spray by Nasal route 2 times daily Use in each nostril as directed 60 mL 1    Handicap Placard MISC Expiration 8/2025 1 each 0    fluticasone (FLONASE) 50 MCG/ACT nasal spray SHAKE LIQUID AND USE 1 SPRAY IN EACH NOSTRIL EVERY DAY AS NEEDED (Patient not taking: Reported on 6/12/2025) 16 g 5    carbamide peroxide (DEBROX) 6.5 % otic solution Place 5 drops into the left ear as needed (Wax buildup) (Patient not taking: Reported on 3/31/2025) 15 mL 0     No current facility-administered medications for this visit.       Social History     Socioeconomic History    Marital status:      Spouse name: Not on file    Number of children: Not on file    Years of education: Not on file    Highest education level: Not on file   Occupational History     Employer: NOT EMPLOYED   Tobacco Use    Smoking status: Never    Smokeless tobacco: Never   Vaping Use    Vaping status: Never Used   Substance and Sexual Activity    Alcohol use: No     Alcohol/week: 0.0 standard drinks of alcohol    Drug use: No    Sexual activity: Not Currently   Other Topics Concern    Not on file   Social History Narrative    Not on file     Social Drivers of Health     Financial Resource Strain: Low Risk  (4/26/2024)    Overall Financial Resource Strain (CARDIA)

## 2025-06-13 ENCOUNTER — TELEPHONE (OUTPATIENT)
Dept: CARDIOLOGY | Age: 70
End: 2025-06-13

## 2025-06-13 ENCOUNTER — TELEPHONE (OUTPATIENT)
Dept: CARDIOLOGY CLINIC | Age: 70
End: 2025-06-13

## 2025-06-13 NOTE — TELEPHONE ENCOUNTER
----- Message from Dr. James Swanson MD sent at 6/12/2025  4:58 PM EDT -----  Can you let her know that I ordered a BMP for next week?  I also ordered an echo in December which was not done.  Could you please look into it?  Can place a fresh order if necessary.

## 2025-06-13 NOTE — TELEPHONE ENCOUNTER
Called and left pt a message to call office. Patient was scheduled in [place] for echo in 2/26/25 and called and cancelled stated she does not want to be rescheduled.

## 2025-06-13 NOTE — TELEPHONE ENCOUNTER
Patient called in states at home /69 - 6/13/25. Patient states she will make appt for Echo and will go for BMP after her vacation.

## 2025-06-13 NOTE — TELEPHONE ENCOUNTER
CALLED PATIENT 1X AND LEFT MESSAGE TO RESCHEDULE  ECHO THAT WAS SCHEDULED 02-25-25. PATIENT CANCELED AND DID NOT WANT TO RESCHEDULE. PATIENT HAD SEEN   DR. CONNOLLY ON 06-12-25. HE WOULD LIKE FOR THE PATIENT TO BE SCHEDULED.    Electronically signed by Jackie Garcia on 6/13/2025 at 2:05 PM

## 2025-06-16 ENCOUNTER — OFFICE VISIT (OUTPATIENT)
Dept: FAMILY MEDICINE CLINIC | Age: 70
End: 2025-06-16
Payer: MEDICARE

## 2025-06-16 VITALS
HEIGHT: 66 IN | DIASTOLIC BLOOD PRESSURE: 90 MMHG | BODY MASS INDEX: 28.77 KG/M2 | HEART RATE: 65 BPM | TEMPERATURE: 98.7 F | SYSTOLIC BLOOD PRESSURE: 150 MMHG | WEIGHT: 179 LBS | RESPIRATION RATE: 18 BRPM | OXYGEN SATURATION: 95 %

## 2025-06-16 DIAGNOSIS — M54.12 CERVICAL RADICULOPATHY: Primary | ICD-10-CM

## 2025-06-16 PROCEDURE — 99213 OFFICE O/P EST LOW 20 MIN: CPT | Performed by: NURSE PRACTITIONER

## 2025-06-16 PROCEDURE — 1123F ACP DISCUSS/DSCN MKR DOCD: CPT | Performed by: NURSE PRACTITIONER

## 2025-06-16 PROCEDURE — 3080F DIAST BP >= 90 MM HG: CPT | Performed by: NURSE PRACTITIONER

## 2025-06-16 PROCEDURE — 96372 THER/PROPH/DIAG INJ SC/IM: CPT | Performed by: NURSE PRACTITIONER

## 2025-06-16 PROCEDURE — 3077F SYST BP >= 140 MM HG: CPT | Performed by: NURSE PRACTITIONER

## 2025-06-16 RX ORDER — METHYLPREDNISOLONE 4 MG/1
TABLET ORAL
Qty: 1 KIT | Refills: 0 | Status: SHIPPED | OUTPATIENT
Start: 2025-06-16

## 2025-06-16 RX ORDER — TIZANIDINE HYDROCHLORIDE 2 MG/1
2 CAPSULE, GELATIN COATED ORAL EVERY 8 HOURS PRN
Qty: 15 CAPSULE | Refills: 0 | Status: SHIPPED | OUTPATIENT
Start: 2025-06-16

## 2025-06-16 RX ORDER — TRIAMCINOLONE ACETONIDE 40 MG/ML
40 INJECTION, SUSPENSION INTRA-ARTICULAR; INTRAMUSCULAR ONCE
Status: COMPLETED | OUTPATIENT
Start: 2025-06-16 | End: 2025-06-16

## 2025-06-16 RX ADMIN — TRIAMCINOLONE ACETONIDE 40 MG: 40 INJECTION, SUSPENSION INTRA-ARTICULAR; INTRAMUSCULAR at 11:47

## 2025-06-16 NOTE — PROGRESS NOTES
25  Jeannie Soliman : 1955 Sex: female  Age 69 y.o.    Subjective:  Chief Complaint   Patient presents with    Pain     Neck and shoulder blade down to arm, and say's it is tender at the elbow. Present for a week       HPI:   Jeannie Soliman , 69 y.o. female presents to the clinic for evaluation of neck pain x 2 weeks. The patient also reports associated intermittent radiating left shoulder and arm pain.  The patient reports seeing Dr. Camacho 2 weeks ago for similar symptoms and was given left shoulder cortisone injection.  The patient denies known injury, but reports doing repetitive lifting of buckets of water prior to developing symptoms. Pt reports the pain worsens with movement. The patient has taken Tylenol for symptoms. The patient reports unchanged symptoms over time.  Denies edema, ecchymosis, extremity weakness, and paresthesia. Denies headache, fever, chest pain, abdominal pain, shortness of breath, and nausea / vomiting / diarrhea.    ROS:   Unless otherwise stated in this report the patient's positive and negative responses for review of systems for constitutional, eyes, ENT, cardiovascular, respiratory, gastrointestinal, neurological, , musculoskeletal, and integument systems and related systems to the presenting problem are either stated in the history of present illness or were not pertinent or were negative for the symptoms and/or complaints related to the presenting medical problem.  Positives and pertinent negatives as per HPI.  All others reviewed and are negative.      PMH:     Past Medical History:   Diagnosis Date    Bronchitis     Hyperlipidemia     Hypertension     Thyroid disease        Past Surgical History:   Procedure Laterality Date    COLONOSCOPY  2015    UPPER GASTROINTESTINAL ENDOSCOPY  2015       History reviewed. No pertinent family history.    Medications:     Current Outpatient Medications:     methylPREDNISolone (MEDROL DOSEPACK) 4 MG tablet, Take by

## 2025-06-24 DIAGNOSIS — M54.12 CERVICAL RADICULOPATHY: ICD-10-CM

## 2025-06-24 RX ORDER — TIZANIDINE 2 MG/1
TABLET ORAL
Qty: 15 TABLET | OUTPATIENT
Start: 2025-06-24

## 2025-07-08 DIAGNOSIS — E11.9 TYPE 2 DIABETES MELLITUS WITHOUT COMPLICATION, WITHOUT LONG-TERM CURRENT USE OF INSULIN (HCC): Primary | ICD-10-CM

## 2025-07-08 RX ORDER — BLOOD-GLUCOSE METER
EACH MISCELLANEOUS
Status: CANCELLED | OUTPATIENT
Start: 2025-07-08

## 2025-07-08 RX ORDER — DIPHENHYDRAMINE HYDROCHLORIDE 25 MG/1
CAPSULE, LIQUID FILLED ORAL
Qty: 1 KIT | Refills: 0 | Status: SHIPPED | OUTPATIENT
Start: 2025-07-08

## 2025-07-08 RX ORDER — GLUCOSAMINE HCL/CHONDROITIN SU 500-400 MG
CAPSULE ORAL
Qty: 50 STRIP | Refills: 11 | Status: SHIPPED | OUTPATIENT
Start: 2025-07-08

## 2025-07-08 NOTE — TELEPHONE ENCOUNTER
Verio monitor is not covered by insurance.  Can you order generic with note stating whichever is covered along with strips as well.  Thanks.

## 2025-07-17 RX ORDER — LEVOTHYROXINE SODIUM 25 UG/1
25 TABLET ORAL DAILY
Qty: 90 TABLET | Refills: 1 | Status: SHIPPED | OUTPATIENT
Start: 2025-07-17

## 2025-07-17 NOTE — TELEPHONE ENCOUNTER
Name of Medication(s) Requested:  Requested Prescriptions     Pending Prescriptions Disp Refills    levothyroxine (SYNTHROID) 25 MCG tablet [Pharmacy Med Name: LEVOTHYROXINE 0.025MG (25MCG) TAB] 90 tablet 1     Sig: TAKE 1 TABLET BY MOUTH DAILY       Medication is on current medication list Yes    Dosage and directions were verified? Yes    Quantity verified: 90 day supply     Pharmacy Verified?  Yes    Last Appointment:  4/10/2025    Future appts:  Future Appointments   Date Time Provider Department Center   8/14/2025 10:00 AM [facility]   12/16/2025 11:00 AM James Swanson MD [facility]        (If no appt send self scheduling link. .REFILLAPPT)  Scheduling request sent?     [] Yes  [x] No    Does patient need updated?  [x] Yes  [] No

## 2025-08-07 ENCOUNTER — HOSPITAL ENCOUNTER (OUTPATIENT)
Dept: CARDIOLOGY | Age: 70
Discharge: HOME | End: 2025-08-09
Attending: INTERNAL MEDICINE
Payer: MEDICARE

## 2025-08-07 VITALS
DIASTOLIC BLOOD PRESSURE: 90 MMHG | BODY MASS INDEX: 28.77 KG/M2 | WEIGHT: 179 LBS | HEIGHT: 66 IN | SYSTOLIC BLOOD PRESSURE: 150 MMHG

## 2025-08-07 DIAGNOSIS — I49.3 PVC (PREMATURE VENTRICULAR CONTRACTION): ICD-10-CM

## 2025-08-07 PROCEDURE — 93306 TTE W/DOPPLER COMPLETE: CPT

## 2025-08-08 LAB
ECHO AO ASC DIAM: 2.4 CM
ECHO AO ASCENDING AORTA INDEX: 1.26 CM/M2
ECHO AV AREA PEAK VELOCITY: 2.8 CM2
ECHO AV AREA VTI: 3 CM2
ECHO AV AREA/BSA PEAK VELOCITY: 1.5 CM2/M2
ECHO AV AREA/BSA VTI: 1.6 CM2/M2
ECHO AV CUSP MM: 2.2 CM
ECHO AV MEAN GRADIENT: 5 MMHG
ECHO AV MEAN VELOCITY: 1 M/S
ECHO AV PEAK GRADIENT: 10 MMHG
ECHO AV PEAK VELOCITY: 1.6 M/S
ECHO AV VELOCITY RATIO: 0.81
ECHO AV VTI: 30.3 CM
ECHO BSA: 1.94 M2
ECHO EST RA PRESSURE: 3 MMHG
ECHO LA DIAMETER INDEX: 1.88 CM/M2
ECHO LA DIAMETER: 3.6 CM
ECHO LA VOL A-L A2C: 51 ML (ref 22–52)
ECHO LA VOL A-L A4C: 40 ML (ref 22–52)
ECHO LA VOL MOD A2C: 35 ML (ref 22–52)
ECHO LA VOL MOD A4C: 35 ML (ref 22–52)
ECHO LA VOLUME AREA LENGTH: 46 ML
ECHO LA VOLUME INDEX A-L A2C: 27 ML/M2 (ref 16–34)
ECHO LA VOLUME INDEX A-L A4C: 21 ML/M2 (ref 16–34)
ECHO LA VOLUME INDEX AREA LENGTH: 24 ML/M2 (ref 16–34)
ECHO LA VOLUME INDEX MOD A2C: 18 ML/M2 (ref 16–34)
ECHO LA VOLUME INDEX MOD A4C: 18 ML/M2 (ref 16–34)
ECHO LV EF PHYSICIAN: 55 %
ECHO LV FRACTIONAL SHORTENING: 33 % (ref 28–44)
ECHO LV INTERNAL DIMENSION DIASTOLE INDEX: 2.25 CM/M2
ECHO LV INTERNAL DIMENSION DIASTOLIC: 4.3 CM (ref 3.9–5.3)
ECHO LV INTERNAL DIMENSION SYSTOLIC INDEX: 1.52 CM/M2
ECHO LV INTERNAL DIMENSION SYSTOLIC: 2.9 CM
ECHO LV ISOVOLUMETRIC RELAXATION TIME (IVRT): 90 MS
ECHO LV IVSD: 0.8 CM (ref 0.6–0.9)
ECHO LV IVSS: 1.2 CM
ECHO LV MASS 2D: 105.3 G (ref 67–162)
ECHO LV MASS INDEX 2D: 55.1 G/M2 (ref 43–95)
ECHO LV POSTERIOR WALL DIASTOLIC: 0.8 CM (ref 0.6–0.9)
ECHO LV POSTERIOR WALL SYSTOLIC: 1.2 CM
ECHO LV RELATIVE WALL THICKNESS RATIO: 0.37
ECHO LVOT AREA: 3.5 CM2
ECHO LVOT AV VTI INDEX: 0.85
ECHO LVOT DIAM: 2.1 CM
ECHO LVOT MEAN GRADIENT: 3 MMHG
ECHO LVOT PEAK GRADIENT: 6 MMHG
ECHO LVOT PEAK VELOCITY: 1.3 M/S
ECHO LVOT STROKE VOLUME INDEX: 46.9 ML/M2
ECHO LVOT SV: 89.7 ML
ECHO LVOT VTI: 25.9 CM
ECHO MV "A" WAVE DURATION: 94.6 MSEC
ECHO MV A VELOCITY: 0.99 M/S
ECHO MV AREA PHT: 2.3 CM2
ECHO MV AREA VTI: 2.7 CM2
ECHO MV E DECELERATION TIME (DT): 255.8 MS
ECHO MV E VELOCITY: 0.94 M/S
ECHO MV E/A RATIO: 0.95
ECHO MV LVOT VTI INDEX: 1.27
ECHO MV MAX VELOCITY: 1.2 M/S
ECHO MV MEAN GRADIENT: 2 MMHG
ECHO MV MEAN VELOCITY: 0.7 M/S
ECHO MV PEAK GRADIENT: 6 MMHG
ECHO MV PRESSURE HALF TIME (PHT): 96 MS
ECHO MV VTI: 32.8 CM
ECHO PULMONARY ARTERY SYSTOLIC PRESSURE (PASP): 25 MMHG
ECHO PV MAX VELOCITY: 1 M/S
ECHO PV MEAN GRADIENT: 2 MMHG
ECHO PV MEAN VELOCITY: 0.7 M/S
ECHO PV PEAK GRADIENT: 4 MMHG
ECHO PV VTI: 19.9 CM
ECHO PVEIN A DURATION: 66.9 MS
ECHO PVEIN A VELOCITY: 0.3 M/S
ECHO PVEIN PEAK D VELOCITY: 0.4 M/S
ECHO PVEIN PEAK S VELOCITY: 0.6 M/S
ECHO PVEIN S/D RATIO: 1.5
ECHO RIGHT VENTRICULAR SYSTOLIC PRESSURE (RVSP): 25 MMHG
ECHO RV INTERNAL DIMENSION: 2.4 CM
ECHO TV REGURGITANT MAX VELOCITY: 2.36 M/S
ECHO TV REGURGITANT PEAK GRADIENT: 22 MMHG

## 2025-08-13 DIAGNOSIS — E11.9 TYPE 2 DIABETES MELLITUS WITHOUT COMPLICATION, WITHOUT LONG-TERM CURRENT USE OF INSULIN (HCC): ICD-10-CM

## 2025-08-13 RX ORDER — BLOOD SUGAR DIAGNOSTIC
STRIP MISCELLANEOUS
Qty: 100 STRIP | Refills: 5 | Status: SHIPPED | OUTPATIENT
Start: 2025-08-13

## 2025-08-14 ENCOUNTER — OFFICE VISIT (OUTPATIENT)
Dept: FAMILY MEDICINE CLINIC | Age: 70
End: 2025-08-14

## 2025-08-14 VITALS
SYSTOLIC BLOOD PRESSURE: 128 MMHG | RESPIRATION RATE: 19 BRPM | BODY MASS INDEX: 28.77 KG/M2 | OXYGEN SATURATION: 99 % | DIASTOLIC BLOOD PRESSURE: 81 MMHG | HEART RATE: 66 BPM | HEIGHT: 66 IN | TEMPERATURE: 98.2 F | WEIGHT: 179 LBS

## 2025-08-14 DIAGNOSIS — Z12.31 ENCOUNTER FOR SCREENING MAMMOGRAM FOR MALIGNANT NEOPLASM OF BREAST: ICD-10-CM

## 2025-08-14 DIAGNOSIS — Z00.00 MEDICARE ANNUAL WELLNESS VISIT, SUBSEQUENT: Primary | ICD-10-CM

## 2025-08-14 DIAGNOSIS — I10 ESSENTIAL HYPERTENSION: ICD-10-CM

## 2025-08-14 RX ORDER — CHLORTHALIDONE 25 MG/1
25 TABLET ORAL DAILY
Qty: 90 TABLET | Refills: 1 | Status: SHIPPED | OUTPATIENT
Start: 2025-08-14

## 2025-08-14 RX ORDER — ALISKIREN 300 MG/1
300 TABLET, FILM COATED ORAL DAILY
Qty: 90 TABLET | Refills: 1 | Status: SHIPPED | OUTPATIENT
Start: 2025-08-14

## 2025-08-14 ASSESSMENT — PATIENT HEALTH QUESTIONNAIRE - PHQ9
SUM OF ALL RESPONSES TO PHQ QUESTIONS 1-9: 0
SUM OF ALL RESPONSES TO PHQ QUESTIONS 1-9: 0
1. LITTLE INTEREST OR PLEASURE IN DOING THINGS: NOT AT ALL
SUM OF ALL RESPONSES TO PHQ QUESTIONS 1-9: 0
2. FEELING DOWN, DEPRESSED OR HOPELESS: NOT AT ALL
SUM OF ALL RESPONSES TO PHQ QUESTIONS 1-9: 0

## 2025-08-18 ENCOUNTER — OFFICE VISIT (OUTPATIENT)
Dept: FAMILY MEDICINE CLINIC | Age: 70
End: 2025-08-18

## 2025-08-18 VITALS
DIASTOLIC BLOOD PRESSURE: 70 MMHG | WEIGHT: 179 LBS | SYSTOLIC BLOOD PRESSURE: 120 MMHG | TEMPERATURE: 97.8 F | BODY MASS INDEX: 28.77 KG/M2 | RESPIRATION RATE: 18 BRPM | HEIGHT: 66 IN | HEART RATE: 70 BPM | OXYGEN SATURATION: 98 %

## 2025-08-18 DIAGNOSIS — L30.9 DERMATITIS: Primary | ICD-10-CM

## 2025-08-18 RX ORDER — PREDNISONE 10 MG/1
TABLET ORAL
Qty: 18 TABLET | Refills: 0 | Status: SHIPPED | OUTPATIENT
Start: 2025-08-18 | End: 2025-08-27